# Patient Record
Sex: MALE | Race: WHITE | Employment: UNEMPLOYED | URBAN - METROPOLITAN AREA
[De-identification: names, ages, dates, MRNs, and addresses within clinical notes are randomized per-mention and may not be internally consistent; named-entity substitution may affect disease eponyms.]

---

## 2020-03-02 ENCOUNTER — TELEPHONE (OUTPATIENT)
Dept: FAMILY MEDICINE CLINIC | Age: 35
End: 2020-03-02

## 2020-03-02 NOTE — TELEPHONE ENCOUNTER
TELEPHONE INTAKE ENCOUNTER FOR PSYCHIATRIC VISIT    This questionaire is helpful for Dr. Layne Garcia for the purposes of preparing for your first appointment. This is not part of a diagnostic assessment or treatment plan, and is intended for information purposes only. You may choose to decline to answer any of these questions, your ability to schedule an appointment with Dr. Layne Garcia is not dependent on your willingness to answer. (statement has been read to patient, patient agrees and has no concerns or questions)    PATIENT ACKNOWLEDGED    Who referred you?  DR Maninder Owens    What is your primary reason for making an appointment with Dr. Layne Garcia?  PCP ADVISED TO FIND PSY TO MANAGE RXS    In a few words, what symptoms are you hoping to address?  ANXIETY AND DEPRESSION   How long have these symptoms been a problem? o OVER 20 YEARS   Are you having sleep issues you'd like to address?   o YES  o How many hours a night do you sleep? 6 TO 8    For females, is there any chance you are currently pregnant (and number of weeks pregnant)? Have you recently had a baby or are you planning to become pregnant?  NO   If pregnant, stat appointment and send message to Dr. Migel Redmond If \"yes\" pregnant, are you taking prenatal vitamins? Have you ever been diagnosed with depression, anxiety, or any other psychiatric condition?  If \"yes\", what diagnoses have you been given?  YES, ANXIETY, DEPRESSION, BIPOLAR AND ADHD    Are you currently taking any psychiatric medications?  YES   150 MG WELLBUTRIN, 150 MG EFFEXOR   Inform patient that they will need to have an appointment prior to refills on any current medications, so they will need to ensure they have adequate refills leading up to the appointment date.  Informed patient that Dr. Layne Garcia will make the decision whether to continue any medications they are currently prescribed, and they are not guaranteed refills on any current psychiatric medications. to complete prior to your appointment. An appointment should last an hour but please be prepared to wait for up to 1/2-hour and schedule ample time to return to work after your appointment. Please bring any relevant paperwork from your previous psychiatric appointments/assessments with you to your first appointment. You may also fax this paperwork to [employer] at [phone].

## 2020-04-08 ENCOUNTER — VIRTUAL VISIT (OUTPATIENT)
Dept: BEHAVIORAL/MENTAL HEALTH CLINIC | Age: 35
End: 2020-04-08
Payer: COMMERCIAL

## 2020-04-08 PROBLEM — F43.23 ADJUSTMENT DISORDER WITH MIXED ANXIETY AND DEPRESSED MOOD: Status: ACTIVE | Noted: 2020-04-08

## 2020-04-08 PROBLEM — F31.9 BIPOLAR 1 DISORDER (HCC): Status: ACTIVE | Noted: 2020-04-08

## 2020-04-08 PROCEDURE — 90792 PSYCH DIAG EVAL W/MED SRVCS: CPT | Performed by: PSYCHIATRY & NEUROLOGY

## 2020-04-08 RX ORDER — PROPRANOLOL HYDROCHLORIDE 10 MG/1
10 TABLET ORAL 3 TIMES DAILY
Qty: 90 TABLET | Refills: 3 | Status: SHIPPED | OUTPATIENT
Start: 2020-04-08 | End: 2020-09-15 | Stop reason: SDUPTHER

## 2020-04-08 RX ORDER — VENLAFAXINE HYDROCHLORIDE 150 MG/1
150 CAPSULE, EXTENDED RELEASE ORAL DAILY
Qty: 30 CAPSULE | Refills: 3 | Status: SHIPPED | OUTPATIENT
Start: 2020-04-08 | End: 2020-05-22

## 2020-04-08 NOTE — PROGRESS NOTES
4/8/2020    TELEHEALTH EVALUATION -- Audio/Visual (During [facility])    Due to [place] 19 outbreak, patient's office visit was converted to a virtual visit. Patient was contacted and agreed to proceed with a virtual visit via [email or web address]  The risks and benefits of converting to a virtual visit were discussed in light of the current infectious disease epidemic. Patient also understood that insurance coverage and co-pays are up to their individual insurance plans. Patient Location:        Patient's home address    Provider Location ([facility]):        [facility]    History of Present Illness    Javi Maurice is a 29 y.o. male with a history significant for anxiety and depression that has worsened over the past several years. Stopped Wellbutrin about 2 weeks ago, it was making him anxious, more irritable. 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 , has to pass boards   Marital/Committed relationship and parenting history:  None; \"just like my dog to be honest with you\"  Occupational History:  \"I have had a hard time holding down jobs\"   Legal History none  Spiritual History: none    Past Psychiatric History (over the past 6 months, unless otherwise specified):  Previous diagnoses/symptoms: bipolar, anxiety, depression, adhd  Previous therapy: not currently   Self-injurious behavior/risky thoughts or behaviors (past suicidal ideation/attempt): long time ago; cutting when he was a kid   Violence/Risk to others (past homicidal ideation/attempt): none   Current psychiatric provider: none  Previous psychiatric medication trials:  Wellbutrin (made more irritable) has been on a Effexor for a year, lexapro, haldol for tic disorder that is resolved, Seroquel, Strattera, Vyvanse, Ritalin, Adderall, prozac, lithium, Latuda,     Has not tried: zoloft  Previous psychiatric hospitalizations: once as a kid for anorexia     Pt has never had [address] or ECT    Past Medical History:  History of head trauma/seizures: No    Active Ambulatory Problems     Diagnosis Date Noted    Adjustment disorder with mixed anxiety and depressed mood 04/08/2020    Bipolar 1 disorder ([facility] 04/08/2020     Resolved Ambulatory Problems     Diagnosis Date Noted    No Resolved Ambulatory Problems     No Additional Past Medical History       Substance Abuse History (over the past 12 months, unless otherwise specified):   Tobacco: Denies  Caffeine: Denies  Alcohol: Denies  Marijuana: Denies    Denies other illicit substance use    Past Family History:  Family history of mental health conditions or suicide: paternal gpa had severe depression  Denies History of cardiac difficulties or sudden unexplained or cardiac death     Psychiatric Review Of Systems:  Mood: depressed mood, feelings of worthlessness/excessive guilt, fatigue, irritability and excessive worry  Depression: Endorses   depressed mood, increased guilt, denies psychomotor slowing  Sleep: sleeping 8 hours every 24 hour period on average; reports sleep is ok  History of periods of time with decreased need for sleep: Denies  Excessive worries Yes  Obsessions or Compulsions Denies  Nightmares  No  History of trauma No  Dissociative Symptoms Denies  Symptoms of ADHD Endorses   impulsivity, inattention, decreased concentration, hyperactivity or prev diagnosis of ADHD   Auditory or Visual Hallucinations: Denies  Current suicidal/homicidal ideations: Denies    Medications    Current Outpatient Medications:     propranolol (INDERAL) 10 MG tablet, Take 1 tablet by mouth 3 times daily, Disp: 90 tablet, Rfl: 3    venlafaxine (EFFEXOR XR) 150 MG extended release capsule, Take 1 capsule by mouth daily, Disp: 30 capsule, Rfl: 3    Allergies  Allergies not on

## 2020-04-22 ENCOUNTER — VIRTUAL VISIT (OUTPATIENT)
Dept: BEHAVIORAL/MENTAL HEALTH CLINIC | Age: 35
End: 2020-04-22
Payer: COMMERCIAL

## 2020-04-22 PROCEDURE — G8428 CUR MEDS NOT DOCUMENT: HCPCS | Performed by: PSYCHIATRY & NEUROLOGY

## 2020-04-22 PROCEDURE — 99214 OFFICE O/P EST MOD 30 MIN: CPT | Performed by: PSYCHIATRY & NEUROLOGY

## 2020-04-22 RX ORDER — VENLAFAXINE HYDROCHLORIDE 150 MG/1
150 CAPSULE, EXTENDED RELEASE ORAL DAILY
Qty: 30 CAPSULE | Refills: 3 | Status: SHIPPED | OUTPATIENT
Start: 2020-04-22 | End: 2020-05-22

## 2020-04-22 RX ORDER — VENLAFAXINE HYDROCHLORIDE 75 MG/1
75 CAPSULE, EXTENDED RELEASE ORAL DAILY
Qty: 30 CAPSULE | Refills: 3 | Status: SHIPPED | OUTPATIENT
Start: 2020-04-22 | End: 2020-05-06

## 2020-04-22 NOTE — PROGRESS NOTES
Neurological  [] Endocrine  [] Heme/Lymph  [] Allergic/Immunologic      MEDICATIONS:    Current Outpatient Medications:     venlafaxine (EFFEXOR XR) 150 MG extended release capsule, Take 1 capsule by mouth daily, Disp: 30 capsule, Rfl: 3    propranolol (INDERAL) 10 MG tablet, Take 1 tablet by mouth 3 times daily, Disp: 90 tablet, Rfl: 3    venlafaxine (EFFEXOR XR) 150 MG extended release capsule, Take 1 capsule by mouth daily, Disp: 30 capsule, Rfl: 3    Examination:    Vitals: not taken in person, most recent vitals in chart reviewed  There were no vitals filed for this visit. Wt Readings from Last 3 Encounters:   No data found for Wt       Labs:   no recent labs    Mental Status Examination:    Level of consciousness:  alert and oriented to person, place, and situation  Appearance:  well-appearing good grooming and good hygiene  Behavior/Motor:  no abnormalities noted  Attitude toward examiner:  attentive and good eye contact  Speech:  spontaneous, normal rate and normal volume   Mood: \"fine\", appears sad  Affect:  blunted  Thought processes:  linear and logical  Thought content:  Denies suicidal or homicidal ideation, denies auditory or visual hallucinations  Cognition:  no deficits in attention, concentration notable, recent memory grossly intact  Concentration intact  Memory intact  Insight good   Judgement fair   Fund of Knowledge adequate    Assessment:     Patient symptoms are:   [] Well controlled  [x] Improving  [] Worsening  [] No change    Pt is lenin for safety and denies thoughts of SI/HI. Amenable to plan. No acute concerns to address regarding medications. Medical Diagnoses:  Patient Active Problem List   Diagnosis    Adjustment disorder with mixed anxiety and depressed mood    Bipolar 1 disorder ([facility]     Psychiatric Diagnoses:  1. Adjustment disorder with mixed anxiety and depressed mood    2. Bipolar 1 disorder ([facility]        Plan:    1.  Increase Effexor from 75 mg to 150 mg for depression  2. No Labs ordered today   3. Crisis plan reviewed and patient verbally contracts for safety. Go to ED with emergent symptoms or safety concerns. 4. Risks, benefits, side effects of medications, including any / all black box warnings, discussed with patient, who verbalizes their understanding. Patient denies any thoughts of harm to self and denies any acute safety concerns remains appropriate for outpatient level of care. Will continue to reassess with each appointment. Treatment Plan:  Reviewed current Medications with the patient. Education provided on the compliance with treatment. Reviewed OARRs, no concerns identified     The anticipated benefits and side effects of the medications, including the anticipated results of not receiving the medication, and of alternatives to the medications were explained to the patient and their informed consent was obtained for starting medications as well as adjusting the doses (titration or tapering) as indicated. The above information was given by physician in verbal form and sufficient understanding was in evidence. The patient participated in discussion of the information and question and/or concerns were addressed before the medication was given. PSYCHOTHERAPY/COUNSELING:  Encourage patient to attend outpatient appointments and therapy. [x] Therapeutic interview  [] Supportive  [x] CBT  [x] Ongoing  [] Other    No follow-ups on file. Please note this report has been partially produced using speech recognition software  And may cause contain errors related to that system including grammar, punctuation and spelling as well as words and phrases that may seem inappropriate. If there are questions or concerns please feel free to contact me to clarify.     Hallie Sutton MD  Electronically signed by Hallie Sutton MD on 5/15/2020 at 10:00 AM  5/15/2020 10:00 AM    Psychiatry       Due to this being a TeleHealth encounter, evaluation of

## 2020-05-06 ENCOUNTER — VIRTUAL VISIT (OUTPATIENT)
Dept: BEHAVIORAL/MENTAL HEALTH CLINIC | Age: 35
End: 2020-05-06
Payer: COMMERCIAL

## 2020-05-06 PROCEDURE — 90792 PSYCH DIAG EVAL W/MED SRVCS: CPT | Performed by: PSYCHIATRY & NEUROLOGY

## 2020-05-22 ENCOUNTER — VIRTUAL VISIT (OUTPATIENT)
Dept: BEHAVIORAL/MENTAL HEALTH CLINIC | Age: 35
End: 2020-05-22
Payer: COMMERCIAL

## 2020-05-22 PROCEDURE — G8428 CUR MEDS NOT DOCUMENT: HCPCS | Performed by: PSYCHIATRY & NEUROLOGY

## 2020-05-22 PROCEDURE — 99214 OFFICE O/P EST MOD 30 MIN: CPT | Performed by: PSYCHIATRY & NEUROLOGY

## 2020-05-22 NOTE — PROGRESS NOTES
and depressed mood    2. Bipolar 1 disorder ([facility] [address]        Plan:    1. Start/Continue Effexor XR 74 mg QD   2. Propranolol 10 mg TID   3. No Labs ordered today   4. Crisis plan reviewed and patient verbally contracts for safety. Go to ED with emergent symptoms or safety concerns. 5. Risks, benefits, side effects of medications, including any / all black box warnings, discussed with patient, who verbalizes their understanding. Patient denies any thoughts of harm to self and denies any acute safety concerns remains appropriate for outpatient level of care. Will continue to reassess with each appointment. Treatment Plan:  Reviewed current Medications with the patient. Education provided on the compliance with treatment. Reviewed OARRs, no concerns identified     The anticipated benefits and side effects of the medications, including the anticipated results of not receiving the medication, and of alternatives to the medications were explained to the patient and their informed consent was obtained for starting medications as well as adjusting the doses (titration or tapering) as indicated. The above information was given by physician in verbal form and sufficient understanding was in evidence. The patient participated in discussion of the information and question and/or concerns were addressed before the medication was given. PSYCHOTHERAPY/COUNSELING:  Encourage patient to attend outpatient appointments and therapy. [x] Therapeutic interview  [] Supportive  [x] CBT  [x] Ongoing  [] Other    No follow-ups on file. Follow-up in 2weeks, patient informed to call for follow-up with Meliza Magaña     Please note this report has been partially produced using speech recognition software  And may cause contain errors related to that system including grammar, punctuation and spelling as well as words and phrases that may seem inappropriate.  If there are questions or concerns please feel free to contact me to

## 2020-06-02 NOTE — PROGRESS NOTES
Outpatient Medications:     propranolol (INDERAL) 10 MG tablet, Take 1 tablet by mouth 3 times daily, Disp: 90 tablet, Rfl: 3    Allergies  No Known Allergies    Evaluation    Vitals:   Reviewed vitals from recent visit, no concerns     BP Readings from Last 3 Encounters:   No data found for BP       Wt Readings from Last 3 Encounters:   No data found for Wt         Labs:     No other recent labs or imaging    No results found for: ALT, AST, GGT, ALKPHOS, BILITOT      Medical Review Of Systems:  Constitutional:  Negative for fever and activity change. HENT:  Negative for nosebleeds, congestion, rhinorrhea, mouth sores, neck pain and neck stiffness. Eyes:   No complaints of blurred vision. Respiratory:  Negative for cough and wheezing. Cardiovascular:  Negative for chest pain. Gastrointestinal:  Negative for nausea, abdominal pain, diarrhea and constipation  Genitourinary:  Negative of decreased urine volume and difficulty urinating. Reproductive: No complaints reported at this time. Musculoskeletal:  Negative for back pain. Skin:  Negative for pallor, rash and wound. Neurological:  Negative for dizziness, weakness and headaches.     Mental Status Evaluation:  Level of consciousness:  alert and oriented to person, place, and situation  Appearance:  well-appearing good grooming and good hygiene  Behavior/Motor:  no abnormalities noted  Attitude toward examiner:  attentive and good eye contact  Speech:  spontaneous, normal rate and normal volume   Mood: \"down\", appears depressed  Affect:  blunted  Thought processes:  linear and logical  Thought content:  Denies suicidal or homicidal ideation, denies auditory or visual hallucinations  Cognition:  no deficits in attention, concentration notable, recent memory grossly intact  Concentration intact  Memory intact  Insight good   Judgement fair   Fund of Knowledge adequate    Assessment:   Pt is a 63-year-old male with history of bipolar diagnosis who presents for

## 2020-07-07 ENCOUNTER — VIRTUAL VISIT (OUTPATIENT)
Dept: BEHAVIORAL/MENTAL HEALTH CLINIC | Age: 35
End: 2020-07-07
Payer: COMMERCIAL

## 2020-07-07 PROCEDURE — 99214 OFFICE O/P EST MOD 30 MIN: CPT | Performed by: PSYCHIATRY & NEUROLOGY

## 2020-07-07 PROCEDURE — G8428 CUR MEDS NOT DOCUMENT: HCPCS | Performed by: PSYCHIATRY & NEUROLOGY

## 2020-07-07 RX ORDER — FLUOXETINE 10 MG/1
CAPSULE ORAL
Qty: 53 CAPSULE | Refills: 0 | Status: SHIPPED | OUTPATIENT
Start: 2020-07-07 | End: 2020-07-30

## 2020-07-07 RX ORDER — VENLAFAXINE HYDROCHLORIDE 37.5 MG/1
CAPSULE, EXTENDED RELEASE ORAL
Qty: 28 CAPSULE | Refills: 0 | Status: SHIPPED | OUTPATIENT
Start: 2020-07-07 | End: 2020-07-30

## 2020-07-07 NOTE — PROGRESS NOTES
7/7/2020    TELEHEALTH PSYCHIATRY FOLLOWUP -- Audio/Visual (During [facility]-29 public health emergency)      Psychiatric Diagnoses:  1. Adjustment disorder with mixed anxiety and depressed mood    2. Moderate episode of recurrent major depressive disorder (HCC)          Due to COVID 19 outbreak, patient's office visit was converted to a virtual visit. Patient was contacted and agreed to proceed with a virtual visit via [employer]  30 minutes with direct communication with patient for encounter The risks and benefits of converting to a virtual visit were discussed in light of the current infectious disease epidemic. Patient also understood that insurance coverage and co-pays are up to their individual insurance plans. Patient Location:        Patient's home address  Provider Location ([facility]):        [facility]        Assessment/Plan:   Taper effexor    Medical Diagnoses:  Patient Active Problem List   Diagnosis    Adjustment disorder with mixed anxiety and depressed mood    Bipolar 1 disorder ([facility]           AT TODAY'S VISIT   1. Taper off Effexor 75 for one week then 37.5 for 1-2 weeks then off   2. Consider abilify in future   3. Start prozac 10 mg one week then 20    4. No Labs ordered today   5. Crisis plan reviewed and patient verbally contracts for safety. Go to ED with emergent symptoms or safety concerns. 6. Risks, benefits, side effects of medications, including any / all black box warnings, discussed with patient, who verbalizes their understanding. Pt is lenin for safety and denies thoughts of SI/HI. Amenable to plan. No acute concerns to address regarding medications. Subjective:      Pt reports not feeling like he has any sense of urgency, feels like he has no get up and go energy. Only sleeping often during the day. Very little interactions with others. Denies suicidal thoughts. Does enjoy watching tv.  Does enjoy spending time relaxing at home but doesn't enjoy physical activity as much as he once did and feels unmotivated. Says he would like to try tapering off effexor, which he says he thinks has made him paradoxically more depressed. Patient reports they have been compliant with current medication regimen and have not missed a dose. Patient denies medication side effects. At today's visit, patient denies thoughts of harm to self or others since last appointment, and denies auditory or visual hallucinations. At today's visit, pt reports that since our last visit, their average MOOD has been (on a scale of 1-10, with 1 being severely depressed and 10 being not depressed at all)  Very depressed [] 1  [] 2  [] 3  [x] 4  [] 5  [] 6  [] 7  [] 8  [] 9  [] 10  Not depressed    At today's visit, pt reports that since our last visit, their average ANXIETY has been (on a scale of 1-10, with 10 being severely anxious and 1 being not anxious at all)  Not anxious [] 1     [] 2     [] 3     [] 4   [] 5    [x] 6      [] 7   [] 8   [] 9       [] 10  Very anxious       At today's visit, pt reports that since our last visit, their average APPETITE has been    [x] Decreased     [] Normal/Unchanged   [] Increased      At today's visit, pt reports that since our last visit, their average HOURS OF SLEEP (every 24 hours) has been    [] 0-3   [] 4-5    [] 5-6    [x] 6-7    [] 8-9    [] 10-11   [] 11+    At today's visit, pt reports that since our last visit, their average Energy has been     [x] Poor    [] Average  [] Increased         Aggression:  [] yes  [x] no    Patient is [x] Able to contract for safety  [] unable to CONTRACT FOR SAFETY     ROS:  [x] All negative/unchanged except if checked.  Explain positive(checked items) below:     [] Constitutional  [] Eyes  [] Ear/Nose/Mouth/Throat  [] Respiratory  [] CV  [] GI  []   [] Musculoskeletal  [] Skin/Breast  [] Neurological  [] Endocrine  [] Heme/Lymph  [] Allergic/Immunologic      MEDICATIONS:    Current Outpatient Medications:    Therapeutic interview  [] Supportive  [x] CBT  [x] Ongoing  [] Other    No follow-ups on file. Follow-up in 2 weeks, patient informed to call for follow-up    Please note this report has been partially produced using speech recognition software  And may cause contain errors related to that system including grammar, punctuation and spelling as well as words and phrases that may seem inappropriate. If there are questions or concerns please feel free to contact me to clarify. Charly Torres MD  Electronically signed by Charly Torres MD on 9/2/2020 at 10:46 AM  9/2/2020 10:46 AM    Psychiatry   Due to this being a TeleHealth encounter, evaluation of the following organ systems is limited: Vitals/Constitutional/EENT/Resp/CV/GI//MS/Neuro/Skin/Heme-Lymph-Imm. An  electronic signature was used to authenticate this note. --Charly Torres MD on 9/2/2020 at 10:46 AM    Pursuant to the emergency declaration under the [facility], [facility] waiver authority and the [employer] Act, this Virtual  Visit was conducted, with patient's consent, to reduce the patient's risk of exposure to COVID-19 and provide continuity of care for an established patient Services were provided through a video synchronous discussion virtually to substitute for in-person clinic visit.

## 2020-07-08 ENCOUNTER — TELEPHONE (OUTPATIENT)
Dept: FAMILY MEDICINE CLINIC | Age: 35
End: 2020-07-08

## 2020-07-08 RX ORDER — FLUOXETINE HYDROCHLORIDE 20 MG/1
20 CAPSULE ORAL DAILY
Qty: 30 CAPSULE | Refills: 3 | Status: SHIPPED | OUTPATIENT
Start: 2020-07-08 | End: 2020-07-30

## 2020-07-08 NOTE — TELEPHONE ENCOUNTER
FLUoxetine (PROZAC) 10 MG capsule [[id number]]     Order Details   Dose, Route, Frequency: As Directed   Dispense Quantity: 53 capsule Refills: 0 Fills remaining: --           Sig: Take 1 capsule by mouth daily for 7 days, THEN 2 capsules daily for 23 days.          Written Date: 07/07/20 Expiration Date: 07/07/21     Start Date: 07/07/20 End Date: 08/06/20 after 30 doses     Ordering Provider:  -- Authorizing Provider: Aj Mitchell MD Ordering User:  Aj Mitchell MD             Ph[facility]  [facility] [id number]    Pharmacy could not fill above Rx as written. Pharmacy dispensed 10 mg #7 to patient. Per insurance, patient needs additional Rx for 20mg once daily #30. Patient aware. Please send additional Rx to pharmacy.

## 2020-07-08 NOTE — TELEPHONE ENCOUNTER
REFILL REQUEST UPDATE FROM PHYSICIAN    Requested Prescriptions     Signed Prescriptions Disp Refills    FLUoxetine (PROZAC) 20 MG capsule 30 capsule 3     Sig: Take 1 capsule by mouth daily     Authorizing Provider: Jonathan Ford       The following medications were refilled per patient request to the pharmacy identified as patient's preference pharmacy. Prior to refill, the [place] has been reviewed as needed for controlled substance monitoring and no concerns were identified. Orders Placed This Encounter   Medications    FLUoxetine (PROZAC) 20 MG capsule     Sig: Take 1 capsule by mouth daily     Dispense:  30 capsule     Refill:  3       Please inform the patient of any pending lab work, so they may complete this prior to the next medication fill.      Miranda Go MD

## 2020-07-30 ENCOUNTER — VIRTUAL VISIT (OUTPATIENT)
Dept: BEHAVIORAL/MENTAL HEALTH CLINIC | Age: 35
End: 2020-07-30
Payer: COMMERCIAL

## 2020-07-30 PROCEDURE — G8428 CUR MEDS NOT DOCUMENT: HCPCS | Performed by: PSYCHIATRY & NEUROLOGY

## 2020-07-30 PROCEDURE — 99214 OFFICE O/P EST MOD 30 MIN: CPT | Performed by: PSYCHIATRY & NEUROLOGY

## 2020-07-30 RX ORDER — FLUOXETINE 10 MG/1
30 CAPSULE ORAL DAILY
Qty: 90 CAPSULE | Refills: 2 | Status: SHIPPED | OUTPATIENT
Start: 2020-07-30 | End: 2020-09-15 | Stop reason: SDUPTHER

## 2020-07-30 NOTE — PROGRESS NOTES
7/30/2020    TELEHEALTH PSYCHIATRY FOLLOWUP -- Audio/Visual (During UQEML-66 public health emergency)      Psychiatric Diagnoses:  1. Bipolar 1 disorder ([facility]    2. Adjustment disorder with mixed anxiety and depressed mood          Due to COVID 19 outbreak, patient's office visit was converted to a virtual visit. Patient was contacted and agreed to proceed with a virtual visit via DOXY  30 minutes with direct communication with patient for encounter The risks and benefits of converting to a virtual visit were discussed in light of the current infectious disease epidemic. Patient also understood that insurance coverage and co-pays are up to their individual insurance plans. Patient Location:        Patient's home address  Provider Location (City/State):        [address]      Assessment/Plan:   Doing well, planning for his exam in September which he is anxious about. Will increase prozac. Medical Diagnoses:  Patient Active Problem List   Diagnosis    Adjustment disorder with mixed anxiety and depressed mood    Bipolar 1 disorder ([facility]       DATE and changes made   7/7/20  o Tapered plan off Effexor and on to Prozac    7/30/20  o Pt now on Prozac 20 mg and doing well with this but will increase to 30 mg QD for anxiety    o Now off of effexor (first day off)   o Ask about geneSight     AT TODAY'S VISIT   1. Tapered off Effexor today was first day without it   2. Is on week 3 of Prozac 20 mg   3. No Labs ordered today  4. Crisis plan reviewed and patient verbally contracts for safety. Go to ED with emergent symptoms or safety concerns. 5. Risks, benefits, side effects of medications, including any / all black box warnings, discussed with patient, who verbalizes their understanding. Pt is lenin for safety and denies thoughts of SI/HI. Amenable to plan. No acute concerns to address regarding medications.      Subjective:      Pt reports prozac has been helpful   He stopped effexor completely today  Mood has been pretty good on prozac - denies racing thoughts, has had some anxiety   Says it has not been very bad   Sleeping better mood is better  Taking test in September     Patient reports they have been compliant with current medication regimen and have not missed a dose. Patient denies medication side effects. At today's visit, patient denies thoughts of harm to self or others since last appointment, and denies auditory or visual hallucinations. At today's visit, pt reports that since our last visit, their average MOOD has been (on a scale of 1-10, with 1 being severely depressed and 10 being not depressed at all)  Very depressed [] 1  [] 2  [] 3  [] 4  [] 5  [] 6  [] 7  [x] 8  [] 9  [] 10  Not depressed    At today's visit, pt reports that since our last visit, their average ANXIETY has been (on a scale of 1-10, with 10 being severely anxious and 1 being not anxious at all)  Not anxious [] 1     [x] 2     [] 3     [] 4   [] 5    [] 6      [] 7   [] 8   [] 9       [] 10  Very anxious       At today's visit, pt reports that since our last visit, their average APPETITE has been    [] Decreased     [x] Normal/Unchanged   [] Increased      At today's visit, pt reports that since our last visit, their average HOURS OF SLEEP (every 24 hours) has been    [] 0-3   [] 4-5    [] 5-6    [] 6-7    [x] 8-9    [] 10-11   [] 11+    At today's visit, pt reports that since our last visit, their average Energy has been     [] Poor    [x] Average  [] Increased         Aggression:  [] yes  [x] no    Patient is [x] Able to contract for safety  [] unable to CONTRACT FOR SAFETY     ROS:  [x] All negative/unchanged except if checked.  Explain positive(checked items) below:     [] Constitutional  [] Eyes  [] Ear/Nose/Mouth/Throat  [] Respiratory  [] CV  [] GI  []   [] Musculoskeletal  [] Skin/Breast  [] Neurological  [] Endocrine  [] Heme/Lymph  [] Allergic/Immunologic    MEDICATIONS:    Current Outpatient Medications:     propranolol (INDERAL) 10 MG tablet, Take 1 tablet by mouth 3 times daily, Disp: 90 tablet, Rfl: 3    Examination:    Vitals: not taken in person, most recent vitals in chart reviewed  There were no vitals filed for this visit. Wt Readings from Last 3 Encounters:   No data found for Wt       Labs:   no recent labs    Mental Status Examination:    Level of consciousness:  alert and oriented to person, place, and situation  Appearance:  well-appearing good grooming and good hygiene  Behavior/Motor:  no abnormalities noted  Attitude toward examiner:  attentive and good eye contact  Speech:  spontaneous, normal rate and normal volume   Mood: \"better\"  Affect:  mood congruent  Thought processes:  linear and logical  Thought content:  Denies suicidal or homicidal ideation, denies auditory or visual hallucinations  Cognition:  no deficits in attention, concentration notable, recent memory grossly intact  Concentration intact  Memory intact  Insight good   Judgement fair   Fund of Knowledge adequate    Treatment Plan:  Reviewed current Medications with the patient. Education provided on the compliance with treatment. Reviewed OARRs, no concerns identified     The anticipated benefits and side effects of the medications, including the anticipated results of not receiving the medication, and of alternatives to the medications were explained to the patient and their informed consent was obtained for starting medications as well as adjusting the doses (titration or tapering) as indicated. The above information was given by physician in verbal form and sufficient understanding was in evidence. The patient participated in discussion of the information and question and/or concerns were addressed before the medication was given. PSYCHOTHERAPY/COUNSELING:  Encourage patient to attend outpatient appointments and therapy.     [x] Therapeutic interview  [] Supportive  [x] CBT  [x] Ongoing  [] Other    No follow-ups on file. Follow-up in 2 weeks, patient informed to call for follow-up    Please note this report has been partially produced using speech recognition software  And may cause contain errors related to that system including grammar, punctuation and spelling as well as words and phrases that may seem inappropriate. If there are questions or concerns please feel free to contact me to clarify. David Hale MD  Electronically signed by David Hale MD on 7/30/2020 at 4:06 PM  7/30/2020 4:06 PM    Psychiatry   Due to this being a TeleHealth encounter, evaluation of the following organ systems is limited: Vitals/Constitutional/EENT/Resp/CV/GI//MS/Neuro/Skin/Heme-Lymph-Imm. An  electronic signature was used to authenticate this note. --David Hale MD on 7/30/2020 at 4:06 PM    Pursuant to the emergency declaration under the [facility] waiver authority and the [employer] and [employer] Act, this Virtual  Visit was conducted, with patient's consent, to reduce the patient's risk of exposure to COVID-19 and provide continuity of care for an established patient Services were provided through a video synchronous discussion virtually to substitute for in-person clinic visit.

## 2020-09-15 ENCOUNTER — VIRTUAL VISIT (OUTPATIENT)
Dept: BEHAVIORAL/MENTAL HEALTH CLINIC | Age: 35
End: 2020-09-15
Payer: COMMERCIAL

## 2020-09-15 PROCEDURE — G8428 CUR MEDS NOT DOCUMENT: HCPCS | Performed by: PSYCHIATRY & NEUROLOGY

## 2020-09-15 PROCEDURE — 99214 OFFICE O/P EST MOD 30 MIN: CPT | Performed by: PSYCHIATRY & NEUROLOGY

## 2020-09-15 RX ORDER — PROPRANOLOL HYDROCHLORIDE 10 MG/1
10 TABLET ORAL 3 TIMES DAILY
Qty: 90 TABLET | Refills: 3 | Status: SHIPPED | OUTPATIENT
Start: 2020-09-15 | End: 2020-11-11

## 2020-09-15 RX ORDER — FLUOXETINE 10 MG/1
30 CAPSULE ORAL DAILY
Qty: 90 CAPSULE | Refills: 2 | Status: SHIPPED | OUTPATIENT
Start: 2020-09-15 | End: 2020-11-11

## 2020-09-15 NOTE — PROGRESS NOTES
chart reviewed  There were no vitals filed for this visit. Wt Readings from Last 3 Encounters:   No data found for Wt       Labs:   no recent labs    Mental Status Examination:    Limited MSE 2/2 telephone with audio only   Speech:  spontaneous, normal rate and normal volume   Mood: \"ok\"  Affect:  mood congruent  Thought processes:  linear and logical  Thought content:  Denies suicidal or homicidal ideation, denies auditory or visual hallucinations  Cognition:  no deficits in attention, concentration notable, recent memory grossly intact  Concentration intact  Memory intact  Insight good   Judgement fair   Fund of Knowledge adequate    Treatment Plan:  Reviewed current Medications with the patient. Education provided on the compliance with treatment. Reviewed OARRs, no concerns identified     The anticipated benefits and side effects of the medications, including the anticipated results of not receiving the medication, and of alternatives to the medications were explained to the patient and their informed consent was obtained for starting medications as well as adjusting the doses (titration or tapering) as indicated. The above information was given by physician in verbal form and sufficient understanding was in evidence. The patient participated in discussion of the information and question and/or concerns were addressed before the medication was given. PSYCHOTHERAPY/COUNSELING:  Encourage patient to attend outpatient appointments and therapy. [x] Therapeutic interview  [] Supportive  [x] CBT  [x] Ongoing  [] Other    No follow-ups on file. Follow-up in 2 weeks, patient informed to call for follow-up    Please note this report has been partially produced using speech recognition software  And may cause contain errors related to that system including grammar, punctuation and spelling as well as words and phrases that may seem inappropriate.  If there are questions or concerns please feel free to contact me to clarify. Chris Argueta MD  Electronically signed by Chris Argueta MD on 9/15/2020 at 10:53 AM  9/15/2020 10:53 AM    Psychiatry   Due to this being a TeleHealth encounter, evaluation of the following organ systems is limited: Vitals/Constitutional/EENT/Resp/CV/GI//MS/Neuro/Skin/Heme-Lymph-Imm. An  electronic signature was used to authenticate this note. --Chris Argueta MD on 9/15/2020 at 10:53 AM    Pursuant to the emergency declaration under the [address] [place] [address], [facility] waiver authority and the [employer] and [employer] Act, this Virtual  Visit was conducted, with patient's consent, to reduce the patient's risk of exposure to COVID-19 and provide continuity of care for an established patient Services were provided through a video synchronous discussion virtually to substitute for in-person clinic visit.

## 2020-10-07 ENCOUNTER — OFFICE VISIT (OUTPATIENT)
Dept: BEHAVIORAL/MENTAL HEALTH CLINIC | Age: 35
End: 2020-10-07
Payer: COMMERCIAL

## 2020-10-07 VITALS
WEIGHT: 175 LBS | TEMPERATURE: 98.5 F | HEIGHT: 69 IN | DIASTOLIC BLOOD PRESSURE: 68 MMHG | SYSTOLIC BLOOD PRESSURE: 112 MMHG | BODY MASS INDEX: 25.92 KG/M2 | OXYGEN SATURATION: 98 % | HEART RATE: 77 BPM

## 2020-10-07 PROCEDURE — G8484 FLU IMMUNIZE NO ADMIN: HCPCS | Performed by: PSYCHIATRY & NEUROLOGY

## 2020-10-07 PROCEDURE — 99214 OFFICE O/P EST MOD 30 MIN: CPT | Performed by: PSYCHIATRY & NEUROLOGY

## 2020-10-07 PROCEDURE — 1036F TOBACCO NON-USER: CPT | Performed by: PSYCHIATRY & NEUROLOGY

## 2020-10-07 PROCEDURE — G8419 CALC BMI OUT NRM PARAM NOF/U: HCPCS | Performed by: PSYCHIATRY & NEUROLOGY

## 2020-10-07 PROCEDURE — G8427 DOCREV CUR MEDS BY ELIG CLIN: HCPCS | Performed by: PSYCHIATRY & NEUROLOGY

## 2020-10-07 SDOH — HEALTH STABILITY: MENTAL HEALTH: HOW OFTEN DO YOU HAVE A DRINK CONTAINING ALCOHOL?: NEVER

## 2020-10-07 NOTE — PROGRESS NOTES
Patient reports they have been compliant with current medication regimen and have not missed a dose. Patient denies medication side effects. At today's visit, patient denies thoughts of harm to self or others since last appointment, and denies auditory or visual hallucinations. At today's visit, pt reports that since our last visit, their average MOOD has been (on a scale of 1-10, with 1 being severely depressed and 10 being not depressed at all)  Very depressed [] 1  [] 2  [] 3  [x] 4  [] 5  [] 6  [] 7  [] 8  [] 9  [] 10  Not depressed    At today's visit, pt reports that since our last visit, their average ANXIETY has been (on a scale of 1-10, with 10 being severely anxious and 1 being not anxious at all)  Not anxious [] 1     [] 2     [] 3     [] 4   [] 5    [] 6      [] 7   [x] 8   [] 9       [] 10  Very anxious       At today's visit, pt reports that since our last visit, their average APPETITE has been    [] Decreased     [x] Normal/Unchanged   [] Increased      At today's visit, pt reports that since our last visit, their average HOURS OF SLEEP (every 24 hours) has been    [] 0-3   [] 4-5    [] 5-6    [] 6-7    [x] 8-9    [] 10-11   [] 11+    At today's visit, pt reports that since our last visit, their average Energy has been     [] Poor    [x] Average  [] Increased         Aggression:  [] yes  [x] no    Patient is [x] Able to contract for safety  [] unable to CONTRACT FOR SAFETY     ROS:  [x] All negative/unchanged except if checked. Explain positive(checked items) below:     [] Constitutional  [] Eyes  [] Ear/Nose/Mouth/Throat  [] Respiratory  [] CV  [] GI  []   [] Musculoskeletal  [] Skin/Breast  [] Neurological  [] Endocrine  [] Heme/Lymph  [] Allergic/Immunologic      MEDICATIONS:  No current outpatient medications on file.     Examination:    Vitals: not taken in person, most recent vitals in chart reviewed  Vitals:    10/07/20 1629   BP: 112/68   Pulse: 77   Temp: 98.5 °F (36.9 °C) SpO2: 98%       Wt Readings from Last 3 Encounters:   10/07/20 175 lb (79.4 kg)       Labs:   no recent labs    Mental Status Examination:    Level of consciousness:  alert and oriented to person, place, and situation  Appearance:  well-appearing good grooming and good hygiene  Behavior/Motor:  no abnormalities noted  Attitude toward examiner:  attentive and good eye contact  Speech:  spontaneous, normal rate and normal volume   Mood: \"anxious\"  Affect:  mood congruent  Thought processes:  linear and logical  Thought content:  Denies suicidal or homicidal ideation, denies auditory or visual hallucinations  Cognition:  no deficits in attention, concentration notable, recent memory grossly intact  Concentration intact  Memory intact  Insight good   Judgement fair   Fund of Knowledge adequate    Treatment Plan:  Reviewed current Medications with the patient. Education provided on the compliance with treatment. Reviewed OARRs, no concerns identified     The anticipated benefits and side effects of the medications, including the anticipated results of not receiving the medication, and of alternatives to the medications were explained to the patient and their informed consent was obtained for starting medications as well as adjusting the doses (titration or tapering) as indicated. The above information was given by physician in verbal form and sufficient understanding was in evidence. The patient participated in discussion of the information and question and/or concerns were addressed before the medication was given. PSYCHOTHERAPY/COUNSELING:  Encourage patient to attend outpatient appointments and therapy. [x] Therapeutic interview  [] Supportive  [x] CBT  [x] Ongoing  [] Other    No follow-ups on file.  Follow-up in 2 weeks, patient informed to call for follow-up    Please note this report has been partially produced using speech recognition software And may cause contain errors related to that system including grammar, punctuation and spelling as well as words and phrases that may seem inappropriate. If there are questions or concerns please feel free to contact me to clarify.     Sowmya Payne MD  Electronically signed by Sowmya Payne MD on 12/16/2020 at 12:05 PM  12/16/2020 12:05 PM    Psychiatry

## 2020-10-20 ENCOUNTER — VIRTUAL VISIT (OUTPATIENT)
Dept: BEHAVIORAL/MENTAL HEALTH CLINIC | Age: 35
End: 2020-10-20
Payer: COMMERCIAL

## 2020-10-20 PROBLEM — F33.1 MAJOR DEPRESSIVE DISORDER, RECURRENT EPISODE, MODERATE (HCC): Status: ACTIVE | Noted: 2020-10-20

## 2020-10-20 PROBLEM — F41.1 GAD (GENERALIZED ANXIETY DISORDER): Status: ACTIVE | Noted: 2020-10-20

## 2020-10-20 PROCEDURE — G8428 CUR MEDS NOT DOCUMENT: HCPCS | Performed by: PSYCHIATRY & NEUROLOGY

## 2020-10-20 PROCEDURE — 99214 OFFICE O/P EST MOD 30 MIN: CPT | Performed by: PSYCHIATRY & NEUROLOGY

## 2020-10-20 NOTE — PROGRESS NOTES
10/20/2020    TELEHEALTH PSYCHIATRY FOLLOWUP -- Audio/Visual (During MQGEQ-18 public health emergency)      Psychiatric Diagnoses:  1. [place] (generalized anxiety disorder)    2. Major depressive disorder, recurrent episode, moderate (HCC)      Due to COVID 19 outbreak, patient's office visit was converted to a virtual visit. Patient was contacted and agreed to proceed with a virtual visit via DOXY  30 minutes with direct communication with patient for encounter The risks and benefits of converting to a virtual visit were discussed in light of the current infectious disease epidemic. Patient also understood that insurance coverage and co-pays are up to their individual insurance plans. Patient Location:        Patient's home address  Provider Location (City/State):        [address] [place] [address]        Assessment/Plan:   Increase trintellix, is helping with anxiety    Medical Diagnoses:  Patient Active Problem List   Diagnosis    Adjustment disorder with mixed anxiety and depressed mood    [facility] ([facility]    Major depressive disorder, recurrent episode, moderate ([facility]    [place] (generalized anxiety disorder)           DATE and changes made   10/20  o Prozac 30 mg QD   o Trintellix 5 increase to 10 mg QD       AT TODAY'S VISIT     1. No Labs ordered today   2. Crisis plan reviewed and patient verbally contracts for safety. Go to ED with emergent symptoms or safety concerns. 3. Risks, benefits, side effects of medications, including any / all black box warnings, discussed with patient, who verbalizes their understanding. Pt is lenin for safety and denies thoughts of SI/HI. Amenable to plan. No acute concerns to address regarding medications.        Subjective:      Pt reports feeling a better sense of anxiety relief with trintellix   Says he feels more calm   Mild nausea   Sleeping ok at night      Patient reports they have been compliant with current medication regimen and have not missed a daily, Disp: 90 tablet, Rfl: 3    Examination:    Vitals: not taken in person, most recent vitals in chart reviewed  There were no vitals filed for this visit. Wt Readings from Last 3 Encounters:   10/07/20 175 lb (79.4 kg)       Labs:   no recent labs    Mental Status Examination:    Level of consciousness:  alert and oriented to person, place, and situation  Appearance:  well-appearing good grooming and good hygiene  Behavior/Motor:  no abnormalities noted  Attitude toward examiner:  attentive and good eye contact  Speech:  spontaneous, normal rate and normal volume   Mood: \"better\"  Affect:  mood congruent  Thought processes:  linear and logical  Thought content:  Denies suicidal or homicidal ideation, denies auditory or visual hallucinations  Cognition:  no deficits in attention, concentration notable, recent memory grossly intact  Concentration intact  Memory intact  Insight good   Judgement fair   Fund of Knowledge adequate    Treatment Plan:  Reviewed current Medications with the patient. Education provided on the compliance with treatment. Reviewed OARRs, no concerns identified     The anticipated benefits and side effects of the medications, including the anticipated results of not receiving the medication, and of alternatives to the medications were explained to the patient and their informed consent was obtained for starting medications as well as adjusting the doses (titration or tapering) as indicated. The above information was given by physician in verbal form and sufficient understanding was in evidence. The patient participated in discussion of the information and question and/or concerns were addressed before the medication was given. PSYCHOTHERAPY/COUNSELING:  Encourage patient to attend outpatient appointments and therapy. [x] Therapeutic interview  [] Supportive  [x] CBT  [x] Ongoing  [] Other    No follow-ups on file.  Follow-up in 2 weeks, patient informed to call for follow-up    Please note this report has been partially produced using speech recognition software  And may cause contain errors related to that system including grammar, punctuation and spelling as well as words and phrases that may seem inappropriate. If there are questions or concerns please feel free to contact me to clarify. Ac Ceballos MD  Electronically signed by Ac Ceballos MD on 10/20/2020 at 12:47 PM  10/20/2020 12:47 PM    Psychiatry   Due to this being a TeleHealth encounter, evaluation of the following organ systems is limited: Vitals/Constitutional/EENT/Resp/CV/GI//MS/Neuro/Skin/Heme-Lymph-Imm. An  electronic signature was used to authenticate this note. --Ac Ceballos MD on 10/20/2020 at 12:47 PM    Pursuant to the emergency declaration under the [facility], [facility] waiver authority and the [employer] and [employer] Act, this Virtual  Visit was conducted, with patient's consent, to reduce the patient's risk of exposure to COVID-19 and provide continuity of care for an established patient Services were provided through a video synchronous discussion virtually to substitute for in-person clinic visit.

## 2020-10-29 ENCOUNTER — TELEPHONE (OUTPATIENT)
Dept: FAMILY MEDICINE CLINIC | Age: 35
End: 2020-10-29

## 2020-10-29 NOTE — TELEPHONE ENCOUNTER
Please be advised. Patient called office because he felt like he was having panic attack. He wanted to know if you would call him. I told him you were out of the office until Monday. I gave him number to  mental health crisis line and crisis text line. He said he would call crisis line.

## 2020-11-10 ENCOUNTER — VIRTUAL VISIT (OUTPATIENT)
Dept: BEHAVIORAL/MENTAL HEALTH CLINIC | Age: 35
End: 2020-11-10
Payer: COMMERCIAL

## 2020-11-10 PROCEDURE — 99443 PR PHYS/QHP TELEPHONE EVALUATION 21-30 MIN: CPT | Performed by: PSYCHIATRY & NEUROLOGY

## 2020-11-10 NOTE — PROGRESS NOTES
11/10/2020    PSYCHIATRY FOLLOWUP -- Audio ONLY TELEPHONE ENCOUNTER      Psychiatric Diagnoses:  1. Major depressive disorder, recurrent episode, moderate ([address]    2. WELLINGTON (generalized anxiety disorder)          Due to COVID 23 outbreak, patient's office visit was converted to a virtual visit. Patient was contacted and agreed to proceed with a virtual visit via telephone  30 minutes with direct communication with patient for encounter The risks and benefits of converting to a virtual visit were discussed in light of the current infectious disease epidemic. Patient also understood that insurance coverage and co-pays are up to their individual insurance plans. Patient Location:        Patient's home address  Provider Location (City/State):        [place]    This note will not be viewable in [email or web address] for the following reason(s). Patient request to not have note available in [email or web address]. Assessment/Plan:   Patient would like to trial a washout of his medications and reports he has been taking Prozac     Patient is going to go off prozac, will attempt a trial of a washout perior as he is feeling he no longer needs to be on medication and he does seem stable at this point, has not had. Discussed he should make a follow up ASAP for any signs of hypomania or worsening depression or sleep, and patient is amenable and in agreement with plan     Medical Diagnoses:  Patient Active Problem List   Diagnosis    Adjustment disorder with mixed anxiety and depressed mood    Bipolar 1 disorder ([address]    Major depressive disorder, recurrent episode, moderate ([address]    WELLINGTON (generalized anxiety disorder)           DATE and changes made   11/10  o Patient would like to stop medication     AT TODAY'S VISIT     1. No Labs ordered today   2. Crisis plan reviewed and patient verbally contracts for safety. Go to ED with emergent symptoms or safety concerns.   3. Risks, benefits, side effects of medications, including any / all black box warnings, discussed with patient, who verbalizes their understanding. Pt is lenin for safety and denies thoughts of SI/HI. Amenable to plan. No acute concerns to address regarding medications. Subjective:      Pt reports he would like to go off the prozac, has been taking prozac 20 mg for one week. Says he is less tired when not on this medication and may benefit from going off. Patient talks about having been on psych meds since he was a teen, would like to try a washout. Patient reports they have been compliant with current medication regimen and have not missed a dose. Patient denies medication side effects. At today's visit, patient denies thoughts of harm to self or others since last appointment, and denies auditory or visual hallucinations.      At today's visit, pt reports that since our last visit, their average MOOD has been (on a scale of 1-10, with 1 being severely depressed and 10 being not depressed at all)  Very depressed [] 1  [] 2  [] 3  [] 4  [] 5  [] 6  [x] 7  [] 8  [] 9  [] 10  Not depressed    At today's visit, pt reports that since our last visit, their average ANXIETY has been (on a scale of 1-10, with 10 being severely anxious and 1 being not anxious at all)  Not anxious [] 1     [] 2     [] 3     [] 4   [] 5    [x] 6      [] 7   [] 8   [] 9       [] 10  Very anxious       At today's visit, pt reports that since our last visit, their average APPETITE has been    [] Decreased     [x] Normal/Unchanged   [] Increased      At today's visit, pt reports that since our last visit, their average HOURS OF SLEEP (every 24 hours) has been    [] 0-3   [] 4-5    [] 5-6    [] 6-7    [x] 8-9    [] 10-11   [] 11+    At today's visit, pt reports that since our last visit, their average Energy has been     [] Poor    [x] Average  [] Increased         Aggression:  [] yes  [x] no    Patient is [x] Able to contract for safety  [] unable to CONTRACT FOR SAFETY     ROS:  [x] All negative/unchanged except if checked. Explain positive(checked items) below:     [] Constitutional  [] Eyes  [] Ear/Nose/Mouth/Throat  [] Respiratory  [] CV  [] GI  []   [] Musculoskeletal  [] Skin/Breast  [] Neurological  [] Endocrine  [] Heme/Lymph  [] Allergic/Immunologic      MEDICATIONS:    Current Outpatient Medications:     VORTIoxetine (TRINTELLIX) 10 MG TABS tablet, Take 1 tablet by mouth daily, Disp: 30 tablet, Rfl: 1    FLUoxetine (PROZAC) 10 MG capsule, Take 3 capsules by mouth daily, Disp: 90 capsule, Rfl: 2    propranolol (INDERAL) 10 MG tablet, Take 1 tablet by mouth 3 times daily, Disp: 90 tablet, Rfl: 3    Examination:    Vitals: not taken in person, most recent vitals in chart reviewed  There were no vitals filed for this visit. Wt Readings from Last 3 Encounters:   10/07/20 175 lb (79.4 kg)       Labs:   no recent labs    Mental Status Examination:    Limited MSE 2/2 audio only phone appt  Speech:  spontaneous, normal rate and normal volume   Mood: \"better\"  Affect:  mood congruent  Thought processes:  linear and logical  Thought content:  Denies suicidal or homicidal ideation, denies auditory or visual hallucinations  Cognition:  no deficits in attention, concentration notable, recent memory grossly intact  Concentration intact  Memory intact  Insight good   Judgement fair   Fund of Knowledge adequate    Treatment Plan:  Reviewed current Medications with the patient. Education provided on the compliance with treatment. Reviewed OARRs, no concerns identified     The anticipated benefits and side effects of the medications, including the anticipated results of not receiving the medication, and of alternatives to the medications were explained to the patient and their informed consent was obtained for starting medications as well as adjusting the doses (titration or tapering) as indicated.  The above information was given by physician in verbal form and sufficient understanding was in evidence. The patient participated in discussion of the information and question and/or concerns were addressed before the medication was given. PSYCHOTHERAPY/COUNSELING:  Encourage patient to attend outpatient appointments and therapy. [x] Therapeutic interview  [] Supportive  [x] CBT  [x] Ongoing  [] Other    No follow-ups on file. Follow-up in 2 weeks, patient informed to call for follow-up    Please note this report has been partially produced using speech recognition software  And may cause contain errors related to that system including grammar, punctuation and spelling as well as words and phrases that may seem inappropriate. If there are questions or concerns please feel free to contact me to clarify. Adrienne Ventura MD  Electronically signed by Adrienne Ventura MD on 11/10/2020 at 11:26 AM  11/10/2020 11:26 AM    Psychiatry   Due to this being a TeleHealth encounter, evaluation of the following organ systems is limited: Vitals/Constitutional/EENT/Resp/CV/GI//MS/Neuro/Skin/Heme-Lymph-Imm. An  electronic signature was used to authenticate this note. --Adrienne Ventura MD on 11/10/2020 at 11:26 AM    Pursuant to the emergency declaration under the [facility] waiver authority and the [employer] and [employer] Act, this Virtual  Visit was conducted, with patient's consent, to reduce the patient's risk of exposure to COVID-19 and provide continuity of care for an established patient Services were provided through a video synchronous discussion virtually to substitute for in-person clinic visit.

## 2020-12-02 ENCOUNTER — VIRTUAL VISIT (OUTPATIENT)
Dept: BEHAVIORAL/MENTAL HEALTH CLINIC | Age: 35
End: 2020-12-02
Payer: COMMERCIAL

## 2020-12-02 DIAGNOSIS — F33.0 MILD EPISODE OF RECURRENT MAJOR DEPRESSIVE DISORDER (HCC): ICD-10-CM

## 2020-12-02 DIAGNOSIS — F41.1 GAD (GENERALIZED ANXIETY DISORDER): Primary | ICD-10-CM

## 2020-12-02 PROCEDURE — 99214 OFFICE O/P EST MOD 30 MIN: CPT | Performed by: PSYCHIATRY & NEUROLOGY

## 2020-12-02 PROCEDURE — G8428 CUR MEDS NOT DOCUMENT: HCPCS | Performed by: PSYCHIATRY & NEUROLOGY

## 2020-12-02 NOTE — PROGRESS NOTES
12/2/2020    TELEPHONE PSYCHIATRY FOLLOWUP -- Audio ONLY TELEPHONE APPT       Psychiatric Diagnoses:  1. [place]LLINGTON (generalized anxiety disorder)    2. Mild episode of recurrent major depressive disorder ([facility] [address]        This virtual visit was conducted via telephone visit with audio only with patient and provider. Patient gave verbal consent for teleservices and will sign a consent form when feasible. This visit lasted 30 minutes of time spent in patient encounter on the phone. Patient Location:       Home    Provider Location (City/State):        [place]    This note will not be viewable in [employer] for the following reason(s). Patient request to not have note available in [employer]. Due to COVID 19 outbreak, patient's office visit was converted to a telephone visit. Patient was contacted and agreed to proceed with a virtual visit via telephone  30 minutes with direct communication with patient for encounter The risks and benefits of converting to a virtual visit were discussed in light of the current infectious disease epidemic. Patient also understood that insurance coverage and co-pays are up to their individual insurance plans. Patient Location:        Patient's home address  Provider Location (City/State):        [address] [place] [address]        Assessment/Plan:   Discusssed testing accommodations   Patient has significant panic attacks and anxiety   Wants to trial a washout off prozac and is willing to continue at this time, he had considered stopping in the past     Told will fill out this form for time   [email or web address]     Medical Diagnoses:  Patient Active Problem List   Diagnosis    Adjustment disorder with mixed anxiety and depressed mood    Major depressive disorder, recurrent episode, moderate ([address]    WELLINGTON (generalized anxiety disorder)    Attention deficit hyperactivity disorder (ADHD), predominantly inattentive type           DATE and changes made ? 1/21/21  o Continue prozac for now, patient discussed trial of a washout     AT TODAY'S VISIT     1. No Labs ordered today   2. Crisis plan reviewed and patient verbally contracts for safety. Go to ED with emergent symptoms or safety concerns. 3. Risks, benefits, side effects of medications, including any / all black box warnings, discussed with patient, who verbalizes their understanding. Pt is lenin for safety and denies thoughts of SI/HI. Amenable to plan. No acute concerns to address regarding medications. Subjective:      Pt reports he had been diagnosed with COVID  Has been cleaning, studying     Patient reports they have been compliant with current medication regimen and have not missed a dose. Patient denies medication side effects. At today's visit, patient denies thoughts of harm to self or others since last appointment, and denies auditory or visual hallucinations.      At today's visit, pt reports that since our last visit, their average MOOD has been (on a scale of 1-10, with 1 being severely depressed and 10 being not depressed at all)  Very depressed [] 1  [] 2  [x] 3  [] 4  [] 5  [] 6  [] 7  [] 8  [] 9  [] 10  Not depressed    At today's visit, pt reports that since our last visit, their average ANXIETY has been (on a scale of 1-10, with 10 being severely anxious and 1 being not anxious at all)  Not anxious [] 1     [] 2     [] 3     [] 4   [] 5    [] 6      [x] 7   [] 8   [] 9       [] 10  Very anxious       At today's visit, pt reports that since our last visit, their average APPETITE has been    [] Decreased     [] Normal/Unchanged   [] Increased      At today's visit, pt reports that since our last visit, their average HOURS OF SLEEP (every 24 hours) has been    [] 0-3   [] 4-5    [] 5-6    [] 6-7    [] 8-9    [] 10-11   [] 11+    At today's visit, pt reports that since our last visit, their average Energy has been     [] Poor    [] Average  [] Increased Aggression:  [] yes  [x] no    Patient is [x] Able to contract for safety  [] unable to CONTRACT FOR SAFETY     ROS:  [x] All negative/unchanged except if checked. Explain positive(checked items) below:     [] Constitutional  [] Eyes  [] Ear/Nose/Mouth/Throat  [] Respiratory  [] CV  [] GI  []   [] Musculoskeletal  [] Skin/Breast  [] Neurological  [] Endocrine  [] Heme/Lymph  [] Allergic/Immunologic      MEDICATIONS:    Current Outpatient Medications:     FLUoxetine (PROZAC) 10 MG capsule, Take 3 capsules by mouth daily, Disp: 90 capsule, Rfl: 3    Dexmethylphenidate HCl ER (FOCALIN XR) 5 MG CP24, Take 5 mg by mouth daily for 30 days. , Disp: 30 capsule, Rfl: 0    Examination:    Vitals: not taken in person, most recent vitals in chart reviewed  There were no vitals filed for this visit. Wt Readings from Last 3 Encounters:   10/07/20 175 lb (79.4 kg)       Labs:   no recent labs    Mental Status Examination:    (Limited MSE as this is an audio-only telephone encounter)  Speech:  spontaneous, normal rate and normal volume   Mood: \"anxious\"  Affect:  mood congruent  Thought processes:  linear and logical  Thought content:  Denies suicidal or homicidal ideation, denies auditory or visual hallucinations  Cognition:  no deficits in attention, concentration notable, recent memory grossly intact  Concentration intact  Memory intact  Insight good   Judgement fair   Fund of Knowledge adequate    Treatment Plan:  Reviewed current Medications with the patient. Education provided on the compliance with treatment.    Reviewed OARRs, no concerns identified Pursuant to the emergency declaration under the [facility] waiver authority and the [employer] and [employer] Act, this Virtual  Visit was conducted, with patient's consent, to reduce the patient's risk of exposure to COVID-19 and provide continuity of care for an established patient Services were provided through a telephone discussion to substitute for in-person clinic visit.

## 2020-12-09 ENCOUNTER — TELEPHONE (OUTPATIENT)
Dept: BEHAVIORAL/MENTAL HEALTH CLINIC | Age: 35
End: 2020-12-09

## 2020-12-09 NOTE — TELEPHONE ENCOUNTER
Patient called to let you know that he decided to start taking 20 mg of Paxil again. I has some at home does not need a refill for a couple weeks.

## 2021-01-06 ENCOUNTER — VIRTUAL VISIT (OUTPATIENT)
Dept: BEHAVIORAL/MENTAL HEALTH CLINIC | Age: 36
End: 2021-01-06
Payer: COMMERCIAL

## 2021-01-06 DIAGNOSIS — F90.2 ATTENTION DEFICIT HYPERACTIVITY DISORDER (ADHD), COMBINED TYPE: Primary | ICD-10-CM

## 2021-01-06 DIAGNOSIS — F41.1 GAD (GENERALIZED ANXIETY DISORDER): ICD-10-CM

## 2021-01-06 DIAGNOSIS — F33.1 MAJOR DEPRESSIVE DISORDER, RECURRENT EPISODE, MODERATE (HCC): ICD-10-CM

## 2021-01-06 PROCEDURE — 99443 PR PHYS/QHP TELEPHONE EVALUATION 21-30 MIN: CPT | Performed by: PSYCHIATRY & NEUROLOGY

## 2021-01-06 RX ORDER — DEXMETHYLPHENIDATE HYDROCHLORIDE 5 MG/1
5 CAPSULE, EXTENDED RELEASE ORAL DAILY
Qty: 30 CAPSULE | Refills: 0 | Status: SHIPPED | OUTPATIENT
Start: 2021-01-06 | End: 2021-01-22

## 2021-01-06 RX ORDER — FLUOXETINE 10 MG/1
30 CAPSULE ORAL DAILY
Qty: 90 CAPSULE | Refills: 3 | Status: SHIPPED | OUTPATIENT
Start: 2021-01-06 | End: 2021-01-22

## 2021-01-06 NOTE — PROGRESS NOTES
1/6/2021    TELEPHONE PSYCHIATRY FOLLOWUP -- Audio ONLY TELEPHONE APPT       Psychiatric Diagnoses:  1. Attention deficit hyperactivity disorder (ADHD), combined type    2. Attention deficit hyperactivity disorder (ADHD), predominantly inattentive type        This virtual visit was conducted via telephone visit with audio only with patient and provider. Patient gave verbal consent for teleservices and will sign a consent form when feasible. This visit lasted 30 minutes of time spent in patient encounter on the phone. Patient Location:       Home    Provider Location (City/[facility]):        [place], [place]      Due to COVID 19 outbreak, patient's office visit was converted to a telephone visit. Patient was contacted and agreed to proceed with a virtual visit via telephone  30 minutes with direct communication with patient for encounter The risks and benefits of converting to a virtual visit were discussed in light of the current infectious disease epidemic. Patient also understood that insurance coverage and co-pays are up to their individual insurance plans. Patient Location:        Patient's home address  Provider Location (City/State):        [facility] [address]        Assessment/Plan:   New diagnosis of adhd given at this appointment, will start Focalin 5 mg QD. Patient will monitor sleep. Continue prozac which was recently restarted. Apply for accommodations    Medical Diagnoses:  Patient Active Problem List   Diagnosis    Adjustment disorder with mixed anxiety and depressed mood    Major depressive disorder, recurrent episode, moderate ([address]    WELLINGTON (generalized anxiety disorder)    Attention deficit hyperactivity disorder (ADHD), predominantly inattentive type           DATE and changes made   1/6/21  o Cont prozac  o START Focalin     AT TODAY'S VISIT     1. No Labs ordered today   2. Crisis plan reviewed and patient verbally contracts for safety.   Go to ED with emergent symptoms or safety concerns. 3. Risks, benefits, side effects of medications, including any / all black box warnings, discussed with patient, who verbalizes their understanding. Pt is lenin for safety and denies thoughts of SI/HI. Amenable to plan. No acute concerns to address regarding medications. Subjective:      Pt reports he had been more irritable off meds, has now gone back on then    Does feel better with prozac   Discussed symptoms of ADHD, which has been problematic since childhood:   Attention Deficit Hyperactivity Disorder (Patient endorses the following symptoms)   · inattention,   · hyperactivity,   · decreased focus,   · difficulty concentrating,   · frequently interrupts others,   · forgetfulness,   · difficulty following multiple step directions,   · disorganization,   · difficulty listening when spoken to directly,   · fidgets frequently    Discuss that he may benefit from testing accommodations for this     Patient reports they have been compliant with current medication regimen and have not missed a dose. Patient denies medication side effects. At today's visit, patient denies thoughts of harm to self or others since last appointment, and denies auditory or visual hallucinations.      At today's visit, pt reports that since our last visit, their average MOOD has been (on a scale of 1-10, with 1 being severely depressed and 10 being not depressed at all)  Very depressed [] 1  [] 2  [] 3  [x] 4  [] 5  [] 6  [] 7  [] 8  [] 9  [] 10  Not depressed    At today's visit, pt reports that since our last visit, their average ANXIETY has been (on a scale of 1-10, with 10 being severely anxious and 1 being not anxious at all)  Not anxious [] 1     [] 2     [] 3     [] 4   [] 5    [] 6      [x] 7   [] 8   [] 9       [] 10  Very anxious       At today's visit, pt reports that since our last visit, their average APPETITE has been    [] Decreased     [x] Normal/Unchanged   [] Increased      At today's visit, pt reports that since our last visit, their average HOURS OF SLEEP (every 24 hours) has been    [] 0-3   [] 4-5    [] 5-6    [] 6-7    [x] 8-9    [] 10-11   [] 11+    At today's visit, pt reports that since our last visit, their average Energy has been     [] Poor    [x] Average  [] Increased         Aggression:  [] yes  [x] no    Patient is [x] Able to contract for safety  [] unable to CONTRACT FOR SAFETY     ROS:  [x] All negative/unchanged except if checked. Explain positive(checked items) below:     [] Constitutional  [] Eyes  [] Ear/Nose/Mouth/Throat  [] Respiratory  [] CV  [] GI  []   [] Musculoskeletal  [] Skin/Breast  [] Neurological  [] Endocrine  [] Heme/Lymph  [] Allergic/Immunologic      MEDICATIONS:    Current Outpatient Medications:     FLUoxetine (PROZAC) 10 MG capsule, Take 3 capsules by mouth daily, Disp: 90 capsule, Rfl: 3    Dexmethylphenidate HCl ER (FOCALIN XR) 5 MG CP24, Take 5 mg by mouth daily for 30 days. , Disp: 30 capsule, Rfl: 0    Examination:    Vitals: not taken in person, most recent vitals in chart reviewed  There were no vitals filed for this visit. Wt Readings from Last 3 Encounters:   10/07/20 175 lb (79.4 kg)       Labs:   no recent labs    Mental Status Examination:    (Limited MSE as this is an audio-only telephone encounter)  Speech:  spontaneous, normal rate and normal volume   Mood: \"better\"  Affect:  mood congruent  Thought processes:  linear and logical  Thought content:  Denies suicidal or homicidal ideation, denies auditory or visual hallucinations  Cognition:  no deficits in attention, concentration notable, recent memory grossly intact  Concentration intact  Memory intact  Insight good   Judgement fair   Fund of Knowledge adequate    Treatment Plan:  Reviewed current Medications with the patient. Education provided on the compliance with treatment.    Reviewed OARRs, no concerns identified     The anticipated benefits and side effects of the medications, including the anticipated results of not receiving the medication, and of alternatives to the medications were explained to the patient and their informed consent was obtained for starting medications as well as adjusting the doses (titration or tapering) as indicated. The above information was given by physician in verbal form and sufficient understanding was in evidence. The patient participated in discussion of the information and question and/or concerns were addressed before the medication was given. PSYCHOTHERAPY/COUNSELING:  Encourage patient to attend outpatient appointments and therapy. [x] Therapeutic interview  [] Supportive  [x] CBT  [x] Ongoing  [] Other    No follow-ups on file. Follow-up in 2 weeks, patient informed to call for follow-up    Please note this report has been partially produced using speech recognition software  And may cause contain errors related to that system including grammar, punctuation and spelling as well as words and phrases that may seem inappropriate. If there are questions or concerns please feel free to contact me to clarify. Kristen Lopez MD  Electronically signed by Kristen Lopez MD on 1/11/2021 at 12:04 PM  1/11/2021 12:04 PM    Psychiatry   Due to this being a TeleHealth encounter, evaluation of the following organ systems is limited: Vitals/Constitutional/EENT/Resp/CV/GI//MS/Neuro/Skin/Heme-Lymph-Imm. An  electronic signature was used to authenticate this note.   --Kristen Lopez MD on 1/11/2021 at 12:04 PM    Pursuant to the emergency declaration under the [facility], [facility] waiver authority and the [employer] and [employer] Act, this Virtual  Visit was conducted, with patient's consent, to reduce the patient's risk of exposure to COVID-19 and provide continuity of care for an established patient Services were provided through a telephone discussion to substitute for in-person clinic visit.

## 2021-01-11 PROBLEM — F31.9 BIPOLAR 1 DISORDER (HCC): Status: RESOLVED | Noted: 2020-04-08 | Resolved: 2021-01-11

## 2021-01-11 PROBLEM — F90.0 ATTENTION DEFICIT HYPERACTIVITY DISORDER (ADHD), PREDOMINANTLY INATTENTIVE TYPE: Status: ACTIVE | Noted: 2021-01-11

## 2021-01-22 ENCOUNTER — VIRTUAL VISIT (OUTPATIENT)
Dept: BEHAVIORAL/MENTAL HEALTH CLINIC | Age: 36
End: 2021-01-22
Payer: COMMERCIAL

## 2021-01-22 DIAGNOSIS — F33.1 MODERATE EPISODE OF RECURRENT MAJOR DEPRESSIVE DISORDER (HCC): Primary | ICD-10-CM

## 2021-01-22 DIAGNOSIS — F90.0 ATTENTION DEFICIT HYPERACTIVITY DISORDER (ADHD), PREDOMINANTLY INATTENTIVE TYPE: ICD-10-CM

## 2021-01-22 DIAGNOSIS — F41.1 GAD (GENERALIZED ANXIETY DISORDER): ICD-10-CM

## 2021-01-22 PROCEDURE — 99443 PR PHYS/QHP TELEPHONE EVALUATION 21-30 MIN: CPT | Performed by: PSYCHIATRY & NEUROLOGY

## 2021-01-22 RX ORDER — ARIPIPRAZOLE 2 MG/1
2 TABLET ORAL DAILY
Qty: 30 TABLET | Refills: 3 | Status: SHIPPED | OUTPATIENT
Start: 2021-01-22 | End: 2021-05-14

## 2021-01-22 NOTE — PROGRESS NOTES
1/22/2021    TELEPHONE PSYCHIATRY FOLLOWUP -- Audio ONLY TELEPHONE APPT       Psychiatric Diagnoses:  1. Moderate episode of recurrent major depressive disorder ([facility] [address]    2. Attention deficit hyperactivity disorder (ADHD), predominantly inattentive type    3. WELL[place] (generalized anxiety disorder)        This virtual visit was conducted via telephone visit with audio only with patient and provider. Patient gave verbal consent for teleservices and will sign a consent form when feasible. This visit lasted 30 minutes of time spent in patient encounter on the phone. Patient Location:       Home    Provider Location ([facility]/State):        [place], [place]      Due to COVID 19 outbreak, patient's office visit was converted to a telephone visit. Patient was contacted and agreed to proceed with a virtual visit via telephone  30 minutes with direct communication with patient for encounter The risks and benefits of converting to a virtual visit were discussed in light of the current infectious disease epidemic. Patient also understood that insurance coverage and co-pays are up to their individual insurance plans. Patient Location:        Patient's home address  Provider Location (City/State):        [facility] [address]        Assessment/Plan:   START Rapid induction of zoloft 50 mg one week with abilify 2 mg for augmentation for severe anxiety which has continued to be problematic and is limiting functioning. Stop prozac. Stop focalin. Continues to have attention and focus problems. Mood symptoms are overall predominated by severe anxiety but patient denies depressed mood or suicidal mood.      Medical Diagnoses:  Patient Active Problem List   Diagnosis    Adjustment disorder with mixed anxiety and depressed mood    Major depressive disorder, recurrent episode, moderate (HCC)    [place]LL[place] (generalized anxiety disorder)    Attention deficit hyperactivity disorder (ADHD), predominantly inattentive type           DATE and changes made  Wellbutrin (made more irritable) has been on a Effexor for a year, lexapro, haldol for tic disorder that is resolved, Seroquel, Strattera, Vyvanse, Ritalin, Adderall, prozac, lithium, Latuda  · 1/6/21  ? Cont prozac  ? START Focalin   · 1/22  ? Zoloft 50 mg QD for one week then increase to 100 mg QD for one week and ADD abilify 2 mg QD         AT TODAY'S VISIT     1. No Labs ordered today   2. Crisis plan reviewed and patient verbally contracts for safety. Go to ED with emergent symptoms or safety concerns. 3. Risks, benefits, side effects of medications, including any / all black box warnings, discussed with patient, who verbalizes their understanding. Pt is lenin for safety and denies thoughts of SI/HI. Amenable to plan. No acute concerns to address regarding medications. Subjective:      Pt reports HR was elevated, felt SOB on adhd medication so only took this for four days   Will switch to zoloft    Patient reports they have been compliant with current medication regimen and have not missed a dose. Patient denies medication side effects. At today's visit, patient denies thoughts of harm to self or others since last appointment, and denies auditory or visual hallucinations.      At today's visit, pt reports that since our last visit, their average MOOD has been (on a scale of 1-10, with 1 being severely depressed and 10 being not depressed at all)  Very depressed [] 1  [] 2  [] 3  [] 4  [] 5  [] 6  [] 7  [x] 8  [] 9  [] 10  Not depressed    At today's visit, pt reports that since our last visit, their average ANXIETY has been (on a scale of 1-10, with 10 being severely anxious and 1 being not anxious at all)  Not anxious [] 1     [] 2     [x] 3     [] 4   [] 5    [] 6      [] 7   [] 8   [] 9       [] 10  Very anxious       At today's visit, pt reports that since our last visit, their average APPETITE has been    [] Decreased     [x] Normal/Unchanged   [] Increased      At today's visit, pt reports that since our last visit, their average HOURS OF SLEEP (every 24 hours) has been    [] 0-3   [] 4-5    [] 5-6    [] 6-7    [x] 8-9    [] 10-11   [] 11+    At today's visit, pt reports that since our last visit, their average Energy has been     [] Poor    [x] Average  [] Increased         Aggression:  [] yes  [x] no    Patient is [x] Able to contract for safety  [] unable to CONTRACT FOR SAFETY     ROS:  [x] All negative/unchanged except if checked. Explain positive(checked items) below:     [] Constitutional  [] Eyes  [] Ear/Nose/Mouth/Throat  [] Respiratory  [] CV  [] GI  []   [] Musculoskeletal  [] Skin/Breast  [] Neurological  [] Endocrine  [] Heme/Lymph  [] Allergic/Immunologic      MEDICATIONS:    Current Outpatient Medications:     ARIPiprazole (ABILIFY) 2 MG tablet, Take 1 tablet by mouth daily, Disp: 30 tablet, Rfl: 3    sertraline (ZOLOFT) 50 MG tablet, Take 1 tablet by mouth daily for 7 days, THEN 2 tablets daily for 21 days. , Disp: 49 tablet, Rfl: 0    Examination:    Vitals: not taken in person, most recent vitals in chart reviewed  There were no vitals filed for this visit. Wt Readings from Last 3 Encounters:   10/07/20 175 lb (79.4 kg)       Labs:   no recent labs    Mental Status Examination:    (Limited MSE as this is an audio-only telephone encounter)  Speech:  spontaneous, normal rate and normal volume   Mood: \"anxious\"  Affect:  mood congruent  Thought processes:  linear and logical  Thought content:  Denies suicidal or homicidal ideation, denies auditory or visual hallucinations  Cognition:  no deficits in attention, concentration notable, recent memory grossly intact  Concentration intact  Memory intact  Insight good   Judgement fair   Fund of Knowledge adequate    Treatment Plan:  Reviewed current Medications with the patient. Education provided on the compliance with treatment.    Reviewed OARRs, no concerns identified     The anticipated benefits and side effects of the medications, including the anticipated results of not receiving the medication, and of alternatives to the medications were explained to the patient and their informed consent was obtained for starting medications as well as adjusting the doses (titration or tapering) as indicated. The above information was given by physician in verbal form and sufficient understanding was in evidence. The patient participated in discussion of the information and question and/or concerns were addressed before the medication was given. PSYCHOTHERAPY/COUNSELING:  Encourage patient to attend outpatient appointments and therapy. [x] Therapeutic interview  [] Supportive  [x] CBT  [x] Ongoing  [] Other    No follow-ups on file. Follow-up in 2 weeks, patient informed to call for follow-up    Please note this report has been partially produced using speech recognition software  And may cause contain errors related to that system including grammar, punctuation and spelling as well as words and phrases that may seem inappropriate. If there are questions or concerns please feel free to contact me to clarify. Gale Baker MD  Electronically signed by Gale Baker MD on 1/22/2021 at 3:48 PM  1/22/2021 3:48 PM    Psychiatry   Due to this being a TeleHealth encounter, evaluation of the following organ systems is limited: Vitals/Constitutional/EENT/Resp/CV/GI//MS/Neuro/Skin/Heme-Lymph-Imm. An  electronic signature was used to authenticate this note.   --Gale Baker MD on 1/22/2021 at 3:48 PM    Pursuant to the emergency declaration under the [facility], [facility] waiver authority and the [employer] and [employer] Act, this Virtual  Visit was conducted, with patient's consent, to reduce the patient's risk of exposure to COVID-19 and provide continuity of care for an established patient Services were provided through a telephone discussion to substitute for in-person clinic visit.

## 2021-01-29 ENCOUNTER — TELEPHONE (OUTPATIENT)
Dept: BEHAVIORAL/MENTAL HEALTH CLINIC | Age: 36
End: 2021-01-29

## 2021-02-15 NOTE — TELEPHONE ENCOUNTER
REFILL REQUEST UPDATE FROM PHYSICIAN    Requested Prescriptions     Pending Prescriptions Disp Refills    sertraline (ZOLOFT) 50 MG tablet [Pharmacy Med Name: SERTRALINE HCL 50 MG TABLET] 49 tablet 0     Sig: TAKE 1 TABLET BY MOUTH DAILY FOR 7 DAYS, THEN 2 TABLETS DAILY FOR 21 DAYS. The following medications were refilled per patient request to the pharmacy identified as patient's preference pharmacy. Prior to refill, the [facility] has been reviewed as needed for controlled substance monitoring and no concerns were identified. No orders of the defined types were placed in this encounter. Please inform the patient of any pending lab work, so they may complete this prior to the next medication fill.      Kumar Doe MD

## 2021-02-18 RX ORDER — SERTRALINE HYDROCHLORIDE 100 MG/1
100 TABLET, FILM COATED ORAL DAILY
Qty: 30 TABLET | Refills: 2 | Status: SHIPPED | OUTPATIENT
Start: 2021-02-18 | End: 2021-02-24 | Stop reason: SDUPTHER

## 2021-02-24 RX ORDER — SERTRALINE HYDROCHLORIDE 100 MG/1
200 TABLET, FILM COATED ORAL DAILY
Qty: 60 TABLET | Refills: 2 | Status: SHIPPED | OUTPATIENT
Start: 2021-02-24 | End: 2021-04-16

## 2021-03-05 ENCOUNTER — VIRTUAL VISIT (OUTPATIENT)
Dept: BEHAVIORAL/MENTAL HEALTH CLINIC | Age: 36
End: 2021-03-05
Payer: COMMERCIAL

## 2021-03-05 DIAGNOSIS — F43.23 ADJUSTMENT DISORDER WITH MIXED ANXIETY AND DEPRESSED MOOD: ICD-10-CM

## 2021-03-05 DIAGNOSIS — F90.0 ATTENTION DEFICIT HYPERACTIVITY DISORDER (ADHD), PREDOMINANTLY INATTENTIVE TYPE: Primary | ICD-10-CM

## 2021-03-05 DIAGNOSIS — F41.1 GAD (GENERALIZED ANXIETY DISORDER): ICD-10-CM

## 2021-03-05 PROCEDURE — G8428 CUR MEDS NOT DOCUMENT: HCPCS | Performed by: PSYCHIATRY & NEUROLOGY

## 2021-03-05 PROCEDURE — 99214 OFFICE O/P EST MOD 30 MIN: CPT | Performed by: PSYCHIATRY & NEUROLOGY

## 2021-03-05 NOTE — PROGRESS NOTES
3/5/2021    TELEHEALTH PSYCHIATRY FOLLOWUP -- Audio/Visual (During [place]-72 public health emergency)      Psychiatric Diagnoses:  1. Attention deficit hyperactivity disorder (ADHD), predominantly inattentive type    2. Adjustment disorder with mixed anxiety and depressed mood    3. [place] (generalized anxiety disorder)          Due to COVID 23 outbreak, patient's office visit was converted to a virtual visit. Patient was contacted and agreed to proceed with a virtual visit via DOXY  30 minutes with direct communication with patient for encounter The risks and benefits of converting to a virtual visit were discussed in light of the current infectious disease epidemic. Patient also understood that insurance coverage and co-pays are up to their individual insurance plans. Patient Location:        Patient's home address  Provider Location (City/State):        [facility]        Assessment/Plan:   Patient doing well on current medication regimen. No changes. Mood improved, less depressed and more motivated. Less anxiety throughout the day, able to attend to ADLs. Continue to encourage physical activity and adherence to medication regimen. No acute concerns voiced. Medical Diagnoses:  Patient Active Problem List   Diagnosis    Adjustment disorder with mixed anxiety and depressed mood    Major depressive disorder, recurrent episode, moderate ([address]    [place]LL[place] (generalized anxiety disorder)    Attention deficit hyperactivity disorder (ADHD), predominantly inattentive type           DATE and changes made   3/5/21  o Doing well on zoloft 200 mg QD and abilify 2 mg, feeling better about upcoming test     AT TODAY'S VISIT     1. No Labs ordered today  2. Crisis plan reviewed and patient verbally contracts for safety. Go to ED with emergent symptoms or safety concerns. 3. Risks, benefits, side effects of medications, including any / all black box warnings, discussed with patient, who verbalizes their understanding. Pt is lenin for safety and denies thoughts of SI/HI. Amenable to plan. No acute concerns to address regarding medications. Subjective:      Patient denies medication side effects apart from those mentioned in the assessment and plan. Patient reports they have been compliant with current medication regimen and have not missed a dose. At today's visit, patient denies thoughts of harm to self or others since last appointment, and denies auditory or visual hallucinations. At today's visit, pt reports that since our last visit, their average MOOD has been (on a scale of 1-10, with 1 being severely depressed and 10 being not depressed at all          Very depressed [] 1  [] 2  [] 3  [] 4  [] 5  [] 6  [x] 7  [] 8  [] 9  [] 10  Not depressed    At today's visit, pt reports that since our last visit, their average ANXIETY has been (on a scale of 1-10, with 10 being severely anxious and 1 being not anxious at all)            Not anxious [] 1     [] 2     [] 3     [x] 4   [] 5    [] 6      [] 7   [] 8   [] 9       [] 10  Very anxious     At today's visit, pt reports that since our last visit, their average APPETITE has been  [] Decreased     [x] Normal/Unchanged   [] Increased      At today's visit, pt reports that since our last visit, their average HOURS OF SLEEP (every 24 hours) has been  [] 0-3   [] 4-5    [] 5-6    [] 6-7    [x] 8-9    [] 10-11   [] 11+    At today's visit, pt reports that since our last visit, their average Energy has been   [] Poor    [x] Average  [] Increased         Aggression:  [] yes  [x] no    Patient is [x] Able to contract for safety  [] unable to CONTRACT FOR SAFETY     ROS:  [x] All negative/unchanged except if checked.  Explain positive(checked items) below:     Physically feeling well   [] Constitutional  [] Eyes  [] Ear/Nose/Mouth/Throat  [] Respiratory  [] CV  [] GI  []   [] Musculoskeletal  [] Skin/Breast  [] Neurological  [] Endocrine  [] Heme/Lymph  [] Allergic/Immunologic      MEDICATIONS:    Current Outpatient Medications:     sertraline (ZOLOFT) 100 MG tablet, Take 2 tablets by mouth daily, Disp: 60 tablet, Rfl: 2    ARIPiprazole (ABILIFY) 2 MG tablet, Take 1 tablet by mouth daily, Disp: 30 tablet, Rfl: 3    Examination:    Vitals: not taken in person, most recent vitals in chart reviewed  There were no vitals filed for this visit. Wt Readings from Last 3 Encounters:   10/07/20 175 lb (79.4 kg)       Labs:   no recent labs    Mental Status Examination:    Level of consciousness:  alert and oriented to person, place, and situation  Appearance:  well-appearing good grooming and good hygiene  Behavior/Motor:  no abnormalities noted  Attitude toward examiner: friendly, pleasant and cooperative, attentive and good eye contact  Speech:  spontaneous, normal rate and normal volume   Mood: \"better\"  Affect:  mood congruent  Thought processes:  linear and logical  Thought content:  Denies suicidal or homicidal ideation, denies auditory or visual hallucinations  Cognition:  no deficits in attention, concentration notable, recent memory grossly intact  Concentration intact  Memory intact  Insight good   Judgement fair   Fund of Knowledge adequate    Treatment Plan:  Reviewed current Medications with the patient. Education provided on the compliance with treatment. Reviewed OARRs, no concerns identified     The anticipated benefits and side effects of the medications, including the anticipated results of not receiving the medication, and of alternatives to the medications were explained to the patient and their informed consent was obtained for starting medications as well as adjusting the doses (titration or tapering) as indicated. The above information was given by physician in verbal form and sufficient understanding was in evidence.  The patient participated in discussion of the information and question and/or concerns were addressed before the medication was given.       PSYCHOTHERAPY/COUNSELING:  Encourage patient to attend outpatient appointments and therapy. [x] Therapeutic interview  [] Supportive  [x] CBT  [x] Ongoing  [] Other    No follow-ups on file. Follow-up in 2 weeks, patient informed to call for follow-up    Please note this report has been partially produced using speech recognition software  And may cause contain errors related to that system including grammar, punctuation and spelling as well as words and phrases that may seem inappropriate. If there are questions or concerns please feel free to contact me to clarify. Og De Leon MD  Electronically signed by Og De Leon MD on 3/5/2021 at 1:01 PM  3/5/2021 1:01 PM    Psychiatry   Due to this being a TeleHealth encounter, evaluation of the following organ systems is limited: Vitals/Constitutional/EENT/Resp/CV/GI//MS/Neuro/Skin/Heme-Lymph-Imm. An  electronic signature was used to authenticate this note. --Og De Leon MD on 3/5/2021 at 1:01 PM    Pursuant to the emergency declaration under the [facility], [facility]5 waiver authority and the [employer] and [employer] Act, this Virtual  Visit was conducted, with patient's consent, to reduce the patient's risk of exposure to COVID-19 and provide continuity of care for an established patient Services were provided through a video synchronous discussion virtually to substitute for in-person clinic visit.

## 2021-03-06 NOTE — TELEPHONE ENCOUNTER
REFILL REQUEST UPDATE FROM PHYSICIAN    Requested Prescriptions     Signed Prescriptions Disp Refills    sertraline (ZOLOFT) 100 MG tablet 60 tablet 2     Sig: Take 2 tablets by mouth daily     Authorizing Provider: Adelita Mercado       The following medications were refilled per patient request to the pharmacy identified as patient's preference pharmacy. Prior to refill, the [facility] has been reviewed as needed for controlled substance monitoring and no concerns were identified. Orders Placed This Encounter   Medications    DISCONTD: sertraline (ZOLOFT) 100 MG tablet     Sig: Take 1 tablet by mouth daily     Dispense:  30 tablet     Refill:  2    sertraline (ZOLOFT) 100 MG tablet     Sig: Take 2 tablets by mouth daily     Dispense:  60 tablet     Refill:  2       Please inform the patient of any pending lab work, so they may complete this prior to the next medication fill.      Kumar Doe MD

## 2021-03-25 RX ORDER — FLUOXETINE 10 MG/1
CAPSULE ORAL
Qty: 270 CAPSULE | Refills: 1 | Status: SHIPPED | OUTPATIENT
Start: 2021-03-25 | End: 2021-06-09

## 2021-04-16 RX ORDER — SERTRALINE HYDROCHLORIDE 100 MG/1
TABLET, FILM COATED ORAL
Qty: 60 TABLET | Refills: 2 | Status: SHIPPED | OUTPATIENT
Start: 2021-04-16 | End: 2021-06-23

## 2021-05-04 ENCOUNTER — VIRTUAL VISIT (OUTPATIENT)
Dept: BEHAVIORAL/MENTAL HEALTH CLINIC | Age: 36
End: 2021-05-04
Payer: COMMERCIAL

## 2021-05-04 DIAGNOSIS — F41.1 GAD (GENERALIZED ANXIETY DISORDER): Primary | ICD-10-CM

## 2021-05-04 DIAGNOSIS — F33.1 MAJOR DEPRESSIVE DISORDER, RECURRENT EPISODE, MODERATE (HCC): ICD-10-CM

## 2021-05-04 PROCEDURE — 99443 PR PHYS/QHP TELEPHONE EVALUATION 21-30 MIN: CPT | Performed by: PSYCHIATRY & NEUROLOGY

## 2021-05-04 NOTE — PROGRESS NOTES
5/4/2021    TELEPHONE PSYCHIATRY FOLLOWUP -- Audio ONLY TELEPHONE APPT       Psychiatric Diagnoses:  1. WELLINGTON (generalized anxiety disorder)    2. Major depressive disorder, recurrent episode, moderate ([facility] [address]        This virtual visit was conducted via telephone visit with audio only with patient and provider. Patient gave verbal consent for teleservices and will sign a consent form when feasible. This visit lasted 30 minutes of time spent in patient encounter on the phone. Patient Location:       Home    Provider Location ([facility]):        [place], [place]      Due to COVID 19 outbreak, patient's office visit was converted to a telephone visit. Patient was contacted and agreed to proceed with a virtual visit via telephone  30 minutes with direct communication with patient for encounter The risks and benefits of converting to a virtual visit were discussed in light of the current infectious disease epidemic. Patient also understood that insurance coverage and co-pays are up to their individual insurance plans. Patient Location:        Patient's home address  Provider Location ([facility]/State):        [address] [place] [address]        Assessment/Plan:   Patient doing well on current medication regimen. No changes. Mood improved, less depressed and more motivated. Less anxiety throughout the day, able to attend to ADLs. Continue to encourage physical activity and adherence to medication regimen. No acute concerns voiced. DEPRESSION: Patient denies symptoms of low mood, low energy and low motivation, as well as anhedonia, but still motivated to complete necessary work and attend to ADLs. Denies suicidal ideation. Denies any thoughts of wishing they were dead. SLEEP DIFFICULTIES: Continues to struggle with sleep. Sleeping only 5 hours a night. h    ADHD: Continues to struggle with attention and focus has helped and we will increase the dose.  Has not had side effects from this (denies tachycardia, palpitations, or changes in appetite or sleep). GENERALIZED ANXIETY DISORDER: Continues to struggle with anxiety. has helped to decrease the frequency and severity of panic attacks and patient is noticing less overall anxiety. No side effects thus far (denies any orthostatic dizziness or falls, denies sedation). As anxiety continues to be a concern, we will increase the dose. Patient continues to report anxiety throughout the day, however able to attend to ADLs. Continue to encourage physical activity and adherence to medication regimen. No acute concerns voiced. Medical Diagnoses:  Patient Active Problem List   Diagnosis    Adjustment disorder with mixed anxiety and depressed mood    Major depressive disorder, recurrent episode, moderate ([facility] [address]    WELLINGTON (generalized anxiety disorder)    Attention deficit hyperactivity disorder (ADHD), predominantly inattentive type           DATE and changes made   5/4/21  o No changes to medications continue to study for examination    AT TODAY'S VISIT     1. No Labs ordered today   2. Crisis plan reviewed and patient verbally contracts for safety. Go to ED with emergent symptoms or safety concerns. 3. Risks, benefits, side effects of medications, including any / all black box warnings, discussed with patient, who verbalizes their understanding. Pt is lenin for safety and denies thoughts of SI/HI. Amenable to plan. No acute concerns to address regarding medications. Subjective:      Pt reports continues to worry about his upcoming examination     Patient reports they have been compliant with current medication regimen and have not missed a dose. Patient denies medication side effects. At today's visit, patient denies thoughts of harm to self or others since last appointment, and denies auditory or visual hallucinations.      At today's visit, pt reports that since our last visit, their average MOOD has been (on a scale of 1-10, with 1 being severely depressed and 10 being not depressed at all)  Very depressed [] 1  [] 2  [x] 3  [] 4  [] 5  [] 6  [] 7  [] 8  [] 9  [] 10  Not depressed    At today's visit, pt reports that since our last visit, their average ANXIETY has been (on a scale of 1-10, with 10 being severely anxious and 1 being not anxious at all)  Not anxious [] 1     [] 2     [] 3     [] 4   [] 5    [] 6      [x] 7   [] 8   [] 9       [] 10  Very anxious       At today's visit, pt reports that since our last visit, their average APPETITE has been    [] Decreased     [x] Normal/Unchanged   [] Increased      At today's visit, pt reports that since our last visit, their average HOURS OF SLEEP (every 24 hours) has been    [] 0-3   [] 4-5    [] 5-6    [] 6-7    [x] 8-9    [] 10-11   [] 11+    At today's visit, pt reports that since our last visit, their average Energy has been     [] Poor    [x] Average  [] Increased         Aggression:  [] yes  [x] no    Patient is [x] Able to contract for safety  [] unable to CONTRACT FOR SAFETY     ROS:  [x] All negative/unchanged except if checked. Explain positive(checked items) below:     [] Constitutional  [] Eyes  [] Ear/Nose/Mouth/Throat  [] Respiratory  [] CV  [] GI  []   [] Musculoskeletal  [] Skin/Breast  [] Neurological  [] Endocrine  [] Heme/Lymph  [] Allergic/Immunologic      MEDICATIONS:    Current Outpatient Medications:     ARIPiprazole (ABILIFY) 2 MG tablet, TAKE 1 TABLET BY MOUTH EVERY DAY, Disp: 90 tablet, Rfl: 1    sertraline (ZOLOFT) 100 MG tablet, TAKE 2 TABLETS BY MOUTH EVERY DAY, Disp: 60 tablet, Rfl: 2    FLUoxetine (PROZAC) 10 MG capsule, TAKE 3 CAPSULES BY MOUTH EVERY DAY, Disp: 270 capsule, Rfl: 1    Examination:    Vitals: not taken in person, most recent vitals in chart reviewed  There were no vitals filed for this visit.     Wt Readings from Last 3 Encounters:   10/07/20 175 lb (79.4 kg)       Labs:   no recent labs    Mental Status Examination:    (Limited MSE as this is an audio-only telephone encounter)  Speech:  spontaneous, normal rate and normal volume   Mood: \"better\"  Affect:  mood congruent  Thought processes:  linear and logical  Thought content:  Denies suicidal or homicidal ideation, denies auditory or visual hallucinations  Cognition:  no deficits in attention, concentration notable, recent memory grossly intact  Concentration intact  Memory intact  Insight good   Judgement fair   Fund of Knowledge adequate    Treatment Plan:  Reviewed current Medications with the patient. Education provided on the compliance with treatment. Reviewed OARRs, no concerns identified     The anticipated benefits and side effects of the medications, including the anticipated results of not receiving the medication, and of alternatives to the medications were explained to the patient and their informed consent was obtained for starting medications as well as adjusting the doses (titration or tapering) as indicated. The above information was given by physician in verbal form and sufficient understanding was in evidence. The patient participated in discussion of the information and question and/or concerns were addressed before the medication was given. PSYCHOTHERAPY/COUNSELING:  Encourage patient to attend outpatient appointments and therapy. [x] Therapeutic interview  [] Supportive  [x] CBT  [x] Ongoing  [] Other    No follow-ups on file. Follow-up in 2 weeks, patient informed to call for follow-up    Please note this report has been partially produced using speech recognition software  And may cause contain errors related to that system including grammar, punctuation and spelling as well as words and phrases that may seem inappropriate. If there are questions or concerns please feel free to contact me to clarify.     Elizabeth Deshpande MD  Electronically signed by Elizabeth Deshpande MD on 6/7/2021 at 11:48 AM  6/7/2021 11:48 AM    Psychiatry   Due to this being a TeleHealth encounter, evaluation of the following organ systems is limited: Vitals/Constitutional/EENT/Resp/CV/GI//MS/Neuro/Skin/Heme-Lymph-Imm. An  electronic signature was used to authenticate this note. --Volodymyr Lo MD on 6/7/2021 at 11:48 AM    Pursuant to the emergency declaration under the [address] [place] [address] waiver authority and the [employer] Act, this Virtual  Visit was conducted, with patient's consent, to reduce the patient's risk of exposure to COVID-19 and provide continuity of care for an established patient Services were provided through a telephone discussion to substitute for in-person clinic visit.

## 2021-05-14 RX ORDER — ARIPIPRAZOLE 2 MG/1
TABLET ORAL
Qty: 90 TABLET | Refills: 1 | Status: SHIPPED | OUTPATIENT
Start: 2021-05-14 | End: 2021-06-23

## 2021-06-09 ENCOUNTER — OFFICE VISIT (OUTPATIENT)
Dept: BEHAVIORAL/MENTAL HEALTH CLINIC | Age: 36
End: 2021-06-09
Payer: COMMERCIAL

## 2021-06-09 DIAGNOSIS — F33.1 MODERATE EPISODE OF RECURRENT MAJOR DEPRESSIVE DISORDER (HCC): ICD-10-CM

## 2021-06-09 DIAGNOSIS — F41.1 GAD (GENERALIZED ANXIETY DISORDER): Primary | ICD-10-CM

## 2021-06-09 PROCEDURE — 99443 PR PHYS/QHP TELEPHONE EVALUATION 21-30 MIN: CPT | Performed by: PSYCHIATRY & NEUROLOGY

## 2021-06-09 RX ORDER — LORAZEPAM 0.5 MG/1
0.5 TABLET ORAL EVERY 8 HOURS PRN
Qty: 14 TABLET | Refills: 2 | Status: SHIPPED | OUTPATIENT
Start: 2021-06-09 | End: 2021-06-23

## 2021-06-09 NOTE — PROGRESS NOTES
6/9/2021    40 mins total for this encounter = 30 minutes with direct communication with patient for encounter as well as 10 mins spent on chart review, and in the ordering of all necessary labs and/or medications  This virtual visit was conducted via telephone visit with audio only with patient and provider. Patient gave verbal consent for teleservices and will sign a consent form when feasible. This visit lasted 30 minutes of time spent in patient encounter on the phone. Patient Location:       Home    Provider Location ([facility]/State):        [address]     Psychiatric Diagnoses:  1. WELLINGTON (generalized anxiety disorder)    2. Moderate episode of recurrent major depressive disorder (HCC)        Assessment/Plan:   Patient cancelled his examination due to anxiety. Will increase sertraline (Zoloft). Patient denies thoughts of harm to self or others. No acute safety concerns voiced at this appointment. MOOD: WORSENED SYMPTOMS OF DEPRESSION: Patient reports worsening of symptoms of low mood, low energy and low motivation, as well as anhedonia, struggling to be motivated to complete necessary work and attend to activities of daily living. SLEEP: No concerns related to sleep    ATTENTION AND FOCUS: No concerns. No recent issues related to difficulty with attention or focus. ANXIETY: CONTINUED SIGNIFICANT CONCERNS: Patient reports continued symptoms of anxiety, which are largely unchanged. Anxiety continues to cause difficulty in interpersonal relationships and with performing duties at work. BIPOLAR SHENA: DENIES: Patient denies recent symptoms of shena including racing thoughts, increased goal-directed activity, decreased need for sleep, increased energy, persistently elevated or irritable mood, impulsivity, grandiosity, paranoid thoughts or other signs of shena. SUBSTANCE USE: No concerns related to substance use. Not applicable. ASSESSMENT/PLAN: Medication changes needed given the current presentation of symptoms. Patient was amenable to plan related to medications and endorsed understanding of the risks and benefits, which were explained and discussed. No acute concerns. No thoughts of harm to self or others voiced. COUNSELING or THERAPY:   Continue to encourage therapy as part of ongoing treatment of their mental health concerns. Continue to encourage physical activity and adherence to medication regimen. DATE and changes made   6/9/21  o sertraline (Zoloft) 100 mg daily   - Increase to 150 mg daily then 200 mg daily (patient was supposed to be taking this way)   o aripiprazole (Abilify) 2 mg daily  o START lorazepam (Ativan) 0.5 mg twice daily as needed for anxiety        Medical Diagnoses:  Patient Active Problem List   Diagnosis    Adjustment disorder with mixed anxiety and depressed mood    Major depressive disorder, recurrent episode, moderate ([address]    WELLINGTON (generalized anxiety disorder)    Attention deficit hyperactivity disorder (ADHD), predominantly inattentive type         AT TODAY'S VISIT     Crisis plan reviewed and patient verbally contracts for safety. Go to ED with emergent symptoms or safety concerns. Risks, benefits, side effects of medications, including any / all black box warnings, discussed with patient, who verbalizes their understanding. Pt is lenin for safety and denies thoughts of SI/HI. Amenable to plan. No acute concerns to address regarding medications. Subjective:      Patient denies medication side effects apart from those mentioned in the assessment and plan. Patient reports they have been compliant with current medication regimen and have not missed a dose. At today's visit, patient denies thoughts of harm to self or others since last appointment, and denies auditory or visual hallucinations. ROS:  [x] All negative/unchanged except if checked.  Explain positive(checked items) below:       Denies any new or changed physical symptoms other than those noted in the subjective portion of this note   [] Constitutional  [] Eyes  [] Ear/Nose/Mouth/Throat  [] Respiratory  [] CV  [] GI  []   [] Musculoskeletal  [] Skin/Breast  [] Neurological  [] Endocrine  [] Heme/Lymph  [] Allergic/Immunologic    OBJECTIVE:   No results found for this or any previous visit (from the past 1008 hour(s)). MEDICATIONS:    Current Outpatient Medications:     LORazepam (ATIVAN) 0.5 MG tablet, Take 1 tablet by mouth every 8 hours as needed for Anxiety for up to 14 days. , Disp: 14 tablet, Rfl: 2    ARIPiprazole (ABILIFY) 2 MG tablet, TAKE 1 TABLET BY MOUTH EVERY DAY, Disp: 90 tablet, Rfl: 1    sertraline (ZOLOFT) 100 MG tablet, TAKE 2 TABLETS BY MOUTH EVERY DAY, Disp: 60 tablet, Rfl: 2    Examination:    Vitals: not taken in person, most recent vitals in chart reviewed  There were no vitals filed for this visit. Wt Readings from Last 3 Encounters:   10/07/20 175 lb (79.4 kg)       BP Readings from Last 3 Encounters:   10/07/20 112/68         Mental Status Examination:    Limited mental status examination due to telelphone audio only encounter   Speech:  spontaneous, normal rate and normal volume   Mood: \"down\"  Affect:  mood congruent  Thought processes:  linear and logical  Thought content:  Denies suicidal or homicidal ideation, denies auditory or visual hallucinations  Cognition:  no deficits in attention, concentration notable, recent memory grossly intact  Concentration intact  Memory intact  Insight good   Judgement fair   Fund of Knowledge adequate    PSYCHOTHERAPY/COUNSELING:  Encourage patient to attend outpatient appointments and therapy. [x] Therapeutic interview  [] Supportive  [x] CBT  [x] Ongoing  [] Other    No follow-ups on file.  patient informed to call for follow-up or to reschedule appointment     Please note this report has been partially produced using speech recognition software  And may cause contain errors related to that system including grammar, punctuation and spelling as well as words and phrases that may seem inappropriate. If there are questions or concerns please feel free to contact me to clarify. Christina Elizabeth MD  Electronically signed by Christina Elizabeth MD on 6/9/2021 at 2:27 PM  6/9/2021 2:27 PM    Psychiatry   Due to this being a TeleHealth encounter, evaluation of the following organ systems is limited: Vitals/Constitutional/EENT/Resp/CV/GI//MS/Neuro/Skin/Heme-Lymph-Imm. An  electronic signature was used to authenticate this note. --Christina Elizabeth MD on 6/9/2021 at 2:27 PM    Pursuant to the emergency declaration under the [facility] waiver authority and the [email or web address] and [employer] Act, this Virtual  Visit was conducted, with patient's consent, to reduce the patient's risk of exposure to COVID-19 and provide continuity of care for an established patient Services were provided through a video synchronous discussion virtually to substitute for in-person clinic visit. Treatment Plan:  Reviewed current Medications with the patient. Education provided on the compliance with treatment.    Reviewed OARRs, no concerns identified

## 2021-06-23 ENCOUNTER — VIRTUAL VISIT (OUTPATIENT)
Dept: BEHAVIORAL/MENTAL HEALTH CLINIC | Age: 36
End: 2021-06-23
Payer: COMMERCIAL

## 2021-06-23 DIAGNOSIS — F90.0 ATTENTION DEFICIT HYPERACTIVITY DISORDER (ADHD), PREDOMINANTLY INATTENTIVE TYPE: ICD-10-CM

## 2021-06-23 DIAGNOSIS — F41.1 GAD (GENERALIZED ANXIETY DISORDER): ICD-10-CM

## 2021-06-23 DIAGNOSIS — F33.1 MAJOR DEPRESSIVE DISORDER, RECURRENT EPISODE, MODERATE (HCC): Primary | ICD-10-CM

## 2021-06-23 PROCEDURE — 99443 PR PHYS/QHP TELEPHONE EVALUATION 21-30 MIN: CPT | Performed by: PSYCHIATRY & NEUROLOGY

## 2021-06-23 RX ORDER — SERTRALINE HYDROCHLORIDE 100 MG/1
TABLET, FILM COATED ORAL
Qty: 11 TABLET | Refills: 0 | Status: SHIPPED | OUTPATIENT
Start: 2021-06-23 | End: 2021-11-17

## 2021-06-23 RX ORDER — ARIPIPRAZOLE 5 MG/1
5 TABLET ORAL DAILY
Qty: 30 TABLET | Refills: 3 | Status: SHIPPED | OUTPATIENT
Start: 2021-06-23 | End: 2021-08-10

## 2021-06-23 RX ORDER — FLUOXETINE HYDROCHLORIDE 20 MG/1
CAPSULE ORAL
Qty: 30 CAPSULE | Refills: 3 | Status: SHIPPED | OUTPATIENT
Start: 2021-06-23 | End: 2021-08-10 | Stop reason: SDUPTHER

## 2021-06-23 NOTE — PROGRESS NOTES
6/23/2021    TELEPHONE PSYCHIATRY FOLLOWUP -- Audio ONLY TELEPHONE APPT       Psychiatric Diagnoses:  1. Major depressive disorder, recurrent episode, moderate ([address]    2. WELL[place] (generalized anxiety disorder)    3. Attention deficit hyperactivity disorder (ADHD), predominantly inattentive type        This virtual visit was conducted via telephone visit with audio only with patient and provider. Patient gave verbal consent for teleservices and will sign a consent form when feasible. This visit lasted 30 minutes of time spent in patient encounter on the phone. Patient Location:       Home    Provider Location ([facility]/State):        [place], [place]      Due to COVID 19 outbreak, patient's office visit was converted to a telephone visit. Patient was contacted and agreed to proceed with a virtual visit via telephone  30 minutes with direct communication with patient for encounter The risks and benefits of converting to a virtual visit were discussed in light of the current infectious disease epidemic. Patient also understood that insurance coverage and co-pays are up to their individual insurance plans. Patient Location:        Patient's home address  Provider Location ([facility]):        [facility]:    Patient reports feeling worse with sertraline (Zoloft) dose increase, more tired, sleeping more, mood is more down. Discussed with patient transcranial magnetic stimulation ([facility]) or electroconvulsive therapy (ECT). He prefers medication changes at this point. States he feels very poor motivation, feels bad about this test.     Sho Valdivia [phone] Says patient has been very down (mom was handed phone, patient asked if I would talk with mom) and very flat, no motivation no energy and not sleeping. ASSESSMENT/PLAN: Medication changes needed given the current presentation of symptoms.  Patient was amenable to plan related to medications and endorsed understanding of the risks and benefits, which were explained and discussed. No acute concerns. No thoughts of harm to self or others voiced. MOOD: WORSENED SYMPTOMS OF DEPRESSION: Patient reports worsening of symptoms of low mood, low energy and low motivation, as well as anhedonia, struggling to be motivated to complete necessary work and attend to activities of daily living. SLEEP: IMPROVED: Sleeping better, continues to struggle with sleep somewhat. Sleeping 10-12 hours a night. ATTENTION AND FOCUS: No concerns. No recent issues related to difficulty with attention or focus. ANXIETY: Patient reported no concerns related to anxiety. BIPOLAR SHENA: NO SHENA/HYPOMANIA REPORTED: Patient denies recent symptoms of shena including racing thoughts, increased goal-directed activity, decreased need for sleep, increased energy, persistently elevated or irritable mood, impulsivity, grandiosity, paranoid thoughts or other signs of shena. SUBSTANCE USE: No concerns related to substance use. Not applicable. Wellbutrin (made more irritable) has been on a Effexor for a year, lexapro, haldol for tic disorder that is resolved, Seroquel, Strattera, Vyvanse, Ritalin, Adderall, prozac, lithium, Latuda, vortioxetine (Trintellix), and sertraline (Zoloft) and Abilify (aripiprazole)      COUNSELING or THERAPY:   Continue to encourage therapy as part of ongoing treatment of their mental health concerns. Continue to encourage physical activity and adherence to medication regimen. No acute concerns voiced.         Medical Diagnoses:  Patient Active Problem List   Diagnosis    Adjustment disorder with mixed anxiety and depressed mood    Major depressive disorder, recurrent episode, moderate ([address]    WELLINGTON (generalized anxiety disorder)    Attention deficit hyperactivity disorder (ADHD), predominantly inattentive type           DATE and changes made   6/23  o Increase Abilify (aripiprazole)   o Cross-taper sertraline (Zoloft) to fluoxetine (Prozac) for efficacy  o Recommended transcranial magnetic stimulation (TMS)/ECT     AT TODAY'S VISIT     1. No Labs ordered today   2. Crisis plan reviewed and patient verbally contracts for safety. Go to ED with emergent symptoms or safety concerns. 3. Risks, benefits, side effects of medications, including any / all black box warnings, discussed with patient, who verbalizes their understanding. Pt is lenin for safety and denies thoughts of SI/HI. Amenable to plan. No acute concerns to address regarding medications. Subjective:      Patient reports they have been compliant with current medication regimen and have not missed a dose. Patient denies medication side effects. At today's visit, patient denies thoughts of harm to self or others since last appointment, and denies auditory or visual hallucinations.      At today's visit, pt reports that since our last visit, their average MOOD has been (on a scale of 1-10, with 1 being severely depressed and 10 being not depressed at all)  Very depressed [] 1  [] 2  [x] 3  [] 4  [] 5  [] 6  [] 7  [] 8  [] 9  [] 10  Not depressed    At today's visit, pt reports that since our last visit, their average ANXIETY has been (on a scale of 1-10, with 10 being severely anxious and 1 being not anxious at all)  Not anxious [] 1     [] 2     [x] 3     [] 4   [] 5    [] 6      [] 7   [] 8   [] 9       [] 10  Very anxious       At today's visit, pt reports that since our last visit, their average APPETITE has been    [] Decreased     [x] Normal/Unchanged   [] Increased      At today's visit, pt reports that since our last visit, their average HOURS OF SLEEP (every 24 hours) has been    [] 0-3   [] 4-5    [] 5-6    [] 6-7    [x] 8-9    [] 10-11   [] 11+    At today's visit, pt reports that since our last visit, their average Energy has been     [] Poor    [x] Average  [] Increased         Aggression:  [] yes  [x] no    Patient is [x] Able to contract for safety  [] unable to CONTRACT FOR SAFETY     ROS:  [x] All negative/unchanged except if checked. Explain positive(checked items) below:     [] Constitutional  [] Eyes  [] Ear/Nose/Mouth/Throat  [] Respiratory  [] CV  [] GI  []   [] Musculoskeletal  [] Skin/Breast  [] Neurological  [] Endocrine  [] Heme/Lymph  [] Allergic/Immunologic      MEDICATIONS:    Current Outpatient Medications:     ARIPiprazole (ABILIFY) 5 MG tablet, Take 1 tablet by mouth daily, Disp: 30 tablet, Rfl: 3    sertraline (ZOLOFT) 100 MG tablet, Take 1 tablet by mouth daily for 7 days, THEN 0.5 tablets daily for 7 days. Then off sertraline (Zoloft) after cross taper complete., Disp: 11 tablet, Rfl: 0    FLUoxetine (PROZAC) 20 MG capsule, Take 1 capsule by mouth daily for 14 days, THEN 2 capsules daily for 14 days. , Disp: 30 capsule, Rfl: 3    LORazepam (ATIVAN) 0.5 MG tablet, Take 1 tablet by mouth every 8 hours as needed for Anxiety for up to 14 days. , Disp: 14 tablet, Rfl: 2    Examination:    Vitals: not taken in person, most recent vitals in chart reviewed  There were no vitals filed for this visit. Wt Readings from Last 3 Encounters:   10/07/20 175 lb (79.4 kg)       Labs:   no recent labs    Mental Status Examination:    (Limited MSE as this is an audio-only telephone encounter)  Speech:  spontaneous, normal rate and normal volume   Mood: \"bad\"  Affect:  mood congruent  Thought processes:  linear and logical  Thought content:  Denies suicidal or homicidal ideation, denies auditory or visual hallucinations  Cognition:  no deficits in attention, concentration notable, recent memory grossly intact  Concentration intact  Memory intact  Insight good   Judgement fair   Fund of Knowledge adequate    Treatment Plan:  Reviewed current Medications with the patient. Education provided on the compliance with treatment.    Reviewed OARRs, no concerns identified     The anticipated benefits and side effects of the medications, including the anticipated results of not receiving the medication, and of alternatives to the medications were explained to the patient and their informed consent was obtained for starting medications as well as adjusting the doses (titration or tapering) as indicated. The above information was given by physician in verbal form and sufficient understanding was in evidence. The patient participated in discussion of the information and question and/or concerns were addressed before the medication was given. PSYCHOTHERAPY/COUNSELING:  Encourage patient to attend outpatient appointments and therapy. [x] Therapeutic interview  [] Supportive  [x] CBT  [x] Ongoing  [] Other    No follow-ups on file. Follow-up in 2 weeks, patient informed to call for follow-up    Please note this report has been partially produced using speech recognition software  And may cause contain errors related to that system including grammar, punctuation and spelling as well as words and phrases that may seem inappropriate. If there are questions or concerns please feel free to contact me to clarify. Mely Rosenthal MD  Electronically signed by Mely Rosenthal MD on 6/23/2021 at 5:11 PM  6/23/2021 5:11 PM    Psychiatry   Due to this being a TeleHealth encounter, evaluation of the following organ systems is limited: Vitals/Constitutional/EENT/Resp/CV/GI//MS/Neuro/Skin/Heme-Lymph-Imm. An  electronic signature was used to authenticate this note.   --Mely Rosenthal MD on 6/23/2021 at 5:11 PM    Pursuant to the emergency declaration under the [facility] waiver authority and the [employer] Act, this Virtual  Visit was conducted, with patient's consent, to reduce the patient's risk of exposure to COVID-19 and provide continuity of care for an established patient Services were provided through a telephone discussion to substitute for in-person clinic

## 2021-07-08 ENCOUNTER — TELEPHONE (OUTPATIENT)
Dept: BEHAVIORAL/MENTAL HEALTH CLINIC | Age: 36
End: 2021-07-08

## 2021-07-08 NOTE — TELEPHONE ENCOUNTER
Dr. Arturo Nicole,    Patient's mom, Nadeen Sánchez,  called office in regards to being concerned about going out of town on Monday for two weeks. Patient is going to be at home alone. She is asking for advise on how to make sure Ben Mora will be ok for two weeks. She asked her sister to check on him but the sister wants \"a plan\". Nadeen Sánchez wants to know if you will call and speak to her briefly  about \"a plan\". Please advise.

## 2021-07-13 NOTE — TELEPHONE ENCOUNTER
"Tram Bal is a 36 year old female who is being evaluated via a billable video visit.      The patient has been notified of following:     \"This video visit will be conducted via a call between you and your physician/provider. We have found that certain health care needs can be provided without the need for an in-person physical exam.  This service lets us provide the care you need with a video conversation.  If a prescription is necessary we can send it directly to your pharmacy.  If lab work is needed we can place an order for that and you can then stop by our lab to have the test done at a later time.    Video visits are billed at different rates depending on your insurance coverage.  Please reach out to your insurance provider with any questions.    If during the course of the call the physician/provider feels a video visit is not appropriate, you will not be charged for this service.\"    Patient has given verbal consent for Video visit? Yes  How would you like to obtain your AVS? MyChart  If you are dropped from the video visit, the video invite should be resent to: Text to cell phone: [phone]  Will anyone else be joining your video visit? No    Subjective     Tram Bal is a 36 year old female who presents today via video visit for the following health issues:    HPI     Concern for COVID-19  About how many days ago did these symptoms start? Last night  Is this your first visit for this illness? Yes  In the 14 days before your symptoms started, have you had close contact with someone with COVID-19 (Coronavirus)? I do not know. (works with public)   Do you have a fever or chills? No 99.5 this am   Are you having new or worsening difficulty breathing? Yes   Please describe what kind of difficulty you are having breathing:Mild dyspnea (decreased exercise tolerance, able to do ADLs without difficulty)  Do you have new or worsening cough? Yes, it's a dry cough.   -mild   Have you had any new or " LMOM to call office unexplained body aches? No    Have you experienced any of the following NEW symptoms?    Headache: YES    Sore throat: No    Loss of taste or smell: No    Chest pain: No    Diarrhea: No    Rash: No  What treatments have you tried? None   Who do you live with? Two daughters   Are you, or a household member, a healthcare worker or a ? No  Do you live in a nursing home, group home, or shelter? No  Do you have a way to get food/medications if quarantined? Yes, I have a friend or family member who can help me.    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      Objective           Vitals:  No vitals were obtained today due to virtual visit.    Physical Exam     GENERAL: Healthy, alert and no distress  EYES: Eyes grossly normal to inspection.  No discharge or erythema, or obvious scleral/conjunctival abnormalities.  RESP: No audible wheeze, cough, or visible cyanosis.  No visible retractions or increased work of breathing.    SKIN: Visible skin clear. No significant rash, abnormal pigmentation or lesions.  NEURO: Cranial nerves grossly intact.  Mentation and speech appropriate for age.  PSYCH: Mentation appears normal, affect normal/bright, judgement and insight intact, normal speech and appearance well-groomed.          Assessment & Plan       Viral URI with cough  No acute distress.  With symptoms recommend testing for COVID 19.  Quarantine recommended and information provided.  Symptomatic care and follow up discussed.  - Asymptomatic COVID-19 Virus (Coronavirus) by PCR; Future     Home care instructions were reviewed with the patient. The risks, benefits and treatment options of prescribed medications or other treatments have been discussed with the patient. The patient verbalized their understanding and should call or  "follow up if no improvement or if they develop further problems.    Patient Instructions   Discharge Instructions for COVID-19 Patients  You have--or may have--COVID-19. Please follow the instructions listed below.   If you have a weakened immune system, discuss with your doctor any other actions you need to take.  How can I protect others?  If you have symptoms (fever, cough, body aches or trouble breathing):    Stay home and away from others (self-isolate) until:  ? At least 10 days have passed since your symptoms started, And   ? You've had no fever--and no medicine that reduces fever--for 1 full day (24 hours), And    ? Your other symptoms have resolved (gotten better).  If you don't show symptoms, but testing showed that you have COVID-19:    Stay home and away from others (self-isolate). Follow the tips under \"How do I self-isolate?\" below for 10 days (20 days if you have a weak immune system).    You don't need to be retested for COVID-19 before going back to school or work. As long as you're fever-free and feeling better, you can go back to school, work and other activities after waiting the 10 or 20 days.   How do I self-isolate?    Stay in your own room, even for meals. Use your own bathroom if you can.    Stay away from others in your home. No hugging, kissing or shaking hands. No visitors.    Don't go to work, school or anywhere else.    Clean \"high touch\" surfaces often (doorknobs, counters, handles). Use household cleaning spray or wipes. You'll find a full list of  on the EPA website: www.epa.gov/pesticide-registration/list-n-disinfectants-use-against-sars-cov-2.    Cover your mouth and nose with a mask or other face covering to avoid spreading germs.    Wash your hands and face often. Use soap and water.    Caregivers in these groups are at risk for severe illness due to COVID-19:  ? People 65 years and older  ? People who live in a nursing home or long-term care facility  ? People with " chronic disease (lung, heart, cancer, diabetes, kidney, liver, immunologic)  ? People who have a weakened immune system, including those who:    Are in cancer treatment    Take medicine that weakens the immune system, such as corticosteroids    Had a bone marrow or organ transplant    Have an immune deficiency    Have poorly controlled HIV or AIDS    Are obese (body mass index of 40 or higher)    Smoke regularly    Caregivers should wear gloves while washing dishes, handling laundry and cleaning bedrooms and bathrooms.    Use caution when washing and drying laundry: Don't shake dirty laundry and use the warmest water setting that you can.    For more tips on managing your health at home, go to www.cdc.gov/coronavirus/2019-ncov/downloads/10Things.pdf.  How can I take care of myself at home?  1. Get lots of rest. Drink extra fluids (unless a doctor has told you not to).    2. Take Tylenol (acetaminophen) for fever or pain. If you have liver or kidney problems, ask your family doctor if it's okay to take Tylenol.     Adults can take either:  ? 650 mg (two 325 mg pills) every 4 to 6 hours, or   ? 1,000 mg (two 500 mg pills) every 8 hours as needed.  ? Note: Don't take more than 3,000 mg in one day. Acetaminophen is found in many medicines (both prescribed and over-the-counter medicines). Read all labels to be sure you don't take too much.   For children, check the Tylenol bottle for the right dose. The dose is based on the child's age or weight.  3. If you have other health problems (like cancer, heart failure, an organ transplant or severe kidney disease): Call your specialty clinic if you don't feel better in the next 2 days.    4. Know when to call 911. Emergency warning signs include:  ? Trouble breathing or shortness of breath  ? Pain or pressure in the chest that doesn't go away  ? Feeling confused like you haven't felt before, or not being able to wake up  ? Bluish-colored lips or face    5. Your doctor may have  prescribed a blood thinner medicine. Follow their instructions.  Where can I get more information?    [facility] - About COVID-19: [email or web address]    CDC - What to Do If You're Sick: www.cdc.gov/coronavirus/2019-ncov/about/steps-when-sick.html    CDC - Ending Home Isolation: www.cdc.gov/coronavirus/2019-ncov/hcp/disposition-in-home-patients.html    CDC - Caring for Someone: www.cdc.gov/coronavirus/2019-ncov/if-you-are-sick/care-for-someone.html    [facility] - Interim Guidance for Hospital Discharge to Home: www.health.[facility].[place]/diseases/coronavirus/hcp/hospdischarge.pdf    [facility] clinical trials (COVID-19 research studies): clinicalaffairs.[facility]-clinical-trials    Below are the COVID-19 hotlines at the [place] Department of Health ([facility]). Interpreters are available.  ? For health questions: Call 266-196-1924 or 1-413.947.9450 (7 a.m. to 7 p.m.)  ? For questions about schools and childcare: Call 945-811-4865 or 1-954.518.7805 (7 a.m. to 7 p.m.)    For informational purposes only. Not to replace the advice of your health care provider. Clinically reviewed by the Infection Prevention Team. Copyright   2020 [facility] [facility]. All rights reserved. [employer] 442024 - REV 08/04/20.          Return in about 1 week (around 10/16/2020), or if symptoms worsen or fail to improve.    DEON Torres CNP  [facility]      Video-Visit Details    Type of service:  Video Visit    Video End Time:11:58    Originating Location (pt. Location): Home    Distant Location (provider location):  [facility]     Platform used for Video Visit: Leo

## 2021-08-10 ENCOUNTER — VIRTUAL VISIT (OUTPATIENT)
Dept: BEHAVIORAL/MENTAL HEALTH CLINIC | Age: 36
End: 2021-08-10
Payer: COMMERCIAL

## 2021-08-10 DIAGNOSIS — Z79.899 OTHER LONG TERM (CURRENT) DRUG THERAPY: ICD-10-CM

## 2021-08-10 DIAGNOSIS — Z13.6 ENCOUNTER FOR SCREENING FOR CARDIOVASCULAR DISORDERS: ICD-10-CM

## 2021-08-10 DIAGNOSIS — F33.2 SEVERE EPISODE OF RECURRENT MAJOR DEPRESSIVE DISORDER, WITHOUT PSYCHOTIC FEATURES (HCC): Primary | ICD-10-CM

## 2021-08-10 DIAGNOSIS — E55.9 VITAMIN D DEFICIENCY, UNSPECIFIED: ICD-10-CM

## 2021-08-10 PROCEDURE — 99443 PR PHYS/QHP TELEPHONE EVALUATION 21-30 MIN: CPT | Performed by: PSYCHIATRY & NEUROLOGY

## 2021-08-10 RX ORDER — ARIPIPRAZOLE 2 MG/1
2 TABLET ORAL DAILY
Qty: 30 TABLET | Refills: 3 | Status: SHIPPED | OUTPATIENT
Start: 2021-08-10 | End: 2021-11-17

## 2021-08-10 RX ORDER — FLUOXETINE HYDROCHLORIDE 20 MG/1
40 CAPSULE ORAL DAILY
Qty: 60 CAPSULE | Refills: 3 | Status: SHIPPED | OUTPATIENT
Start: 2021-08-10 | End: 2021-11-03

## 2021-08-10 RX ORDER — FLUOXETINE HYDROCHLORIDE 20 MG/1
40 CAPSULE ORAL DAILY
Qty: 30 CAPSULE | Refills: 3 | Status: SHIPPED | OUTPATIENT
Start: 2021-08-10 | End: 2021-08-10

## 2021-08-10 NOTE — PROGRESS NOTES
8/10/2021    TELEPHONE PSYCHIATRY FOLLOWUP -- Audio ONLY TELEPHONE APPT       Psychiatric Diagnoses:  1. Severe episode of recurrent major depressive disorder, without psychotic features ([facility] [address]    2. Vitamin D deficiency, unspecified     3. Other long term (current) drug therapy     4. Encounter for screening for cardiovascular disorders         This virtual visit was conducted via telephone visit with audio only with patient and provider. Patient gave verbal consent for teleservices and will sign a consent form when feasible. This visit lasted 30 minutes of time spent in patient encounter on the phone. Patient Location:       Home    Provider Location ([facility]):        [place], [place]      Due to COVID 19 outbreak, patient's office visit was converted to a telephone visit. Patient was contacted and agreed to proceed with a virtual visit via telephone  30 minutes with direct communication with patient for encounter The risks and benefits of converting to a virtual visit were discussed in light of the current infectious disease epidemic. Patient also understood that insurance coverage and co-pays are up to their individual insurance plans. Patient Location:        Patient's home address  Provider Location ([facility]):        [place] [address] [address] PLAN:      Discussed with patient electroconvulsive therapy (ECT), and after a discussion of the risks and benefits, patient is amenable to pursue an electroconvulsive therapy (ECT) evaluation with Dr. Anita Phillips. At the request of Rocío Wynn, I discussed treatment plan with patient's mom on the phone, at patient's request, while on speaker phone so both mom and patient could hear. Patient's mom is amenable to this plan as well. Denies any acute safety concerns. Patient denies any access to firearms and denies suicidal ideation. Mom has patient's medications, is aware of crisis line to call if she has any concerns acutely.  If approved for electroconvulsive therapy (ECT), mom would transport patient to and from treatment. Patient has suicide hotline number and will call 911 for emergencies or if feeling suicidal.       COUNSELING or THERAPY:   Continue to encourage therapy as part of ongoing treatment of their mental health concerns. Continue to encourage physical activity and adherence to medication regimen. No acute concerns voiced. Medical Diagnoses:  Patient Active Problem List   Diagnosis    Adjustment disorder with mixed anxiety and depressed mood    Major depressive disorder, recurrent episode, moderate ([address]    WELLINGTON (generalized anxiety disorder)    Attention deficit hyperactivity disorder (ADHD), predominantly inattentive type           DATE and changes made   Recommend electroconvulsive therapy (ECT)    Decrease Abilify (aripiprazole) back to 2 mg, seems to be causing weight gain in patient   Continue medication pending electroconvulsive therapy (ECT) evaluation   Baseline labs ordered     AT TODAY'S VISIT     1. Crisis plan reviewed and patient verbally contracts for safety. Go to ED with emergent symptoms or safety concerns. 2. Risks, benefits, side effects of medications, including any / all black box warnings, discussed with patient, who verbalizes their understanding. Pt is lenin for safety and denies thoughts of SI/HI. Amenable to plan. No acute concerns to address regarding medications. Subjective:      Pt reports continued depressive symptoms since last appointment at end of June. Reports he is sleeping greater than 12 hours daily, fatigued. Feelings of hopelessness, low mood. denies suicidal ideation. Anergia, anhedonia are predominant and he has not had a single day since our last appointment where he has experienced improvement in his mood. Is also struggling to find motivation to attend to activities of daily living. Struggling to shower, but has been doing this. Increase in appetite also notable.  Patient reports he would like to pursue electroconvulsive therapy (ECT), after a discussion of his available treatment options. Patient reports they have been compliant with current medication regimen and have not missed a dose. Patient denies medication side effects. At today's visit, patient denies thoughts of harm to self or others since last appointment, and denies auditory or visual hallucinations. At today's visit, pt reports that since our last visit, their average MOOD has been (on a scale of 1-10, with 1 being severely depressed and 10 being not depressed at all)  Very depressed [] 1  [x] 2  [] 3  [] 4  [] 5  [] 6  [] 7  [] 8  [] 9  [] 10  Not depressed    At today's visit, pt reports that since our last visit, their average ANXIETY has been (on a scale of 1-10, with 10 being severely anxious and 1 being not anxious at all)  Not anxious [] 1     [] 2     [x] 3     [] 4   [] 5    [] 6      [] 7   [] 8   [] 9       [] 10  Very anxious       At today's visit, pt reports that since our last visit, their average APPETITE has been    [] Decreased     [] Normal/Unchanged   [x] Increased      At today's visit, pt reports that since our last visit, their average HOURS OF SLEEP (every 24 hours) has been    [] 0-3   [] 4-5    [] 5-6    [] 6-7    [] 8-9    [] 10-11   [x] 12+    At today's visit, pt reports that since our last visit, their average Energy has been     [x] Poor    [] Average  [] Increased         Aggression:  [] yes  [x] no    Patient is [x] Able to contract for safety  [] unable to CONTRACT FOR SAFETY     ROS:  [x] All negative/unchanged except if checked.  Explain positive(checked items) below:     [] Constitutional  [] Eyes  [] Ear/Nose/Mouth/Throat  [] Respiratory  [] CV  [] GI  []   [] Musculoskeletal  [] Skin/Breast  [] Neurological  [] Endocrine  [] Heme/Lymph  [] Allergic/Immunologic      MEDICATIONS:    Current Outpatient Medications:     ARIPiprazole (ABILIFY) 2 MG tablet, Take 1 tablet by mouth daily, Disp: 30 tablet, Rfl: 3    FLUoxetine (PROZAC) 20 MG capsule, Take 2 capsules by mouth daily, Disp: 60 capsule, Rfl: 3    sertraline (ZOLOFT) 100 MG tablet, Take 1 tablet by mouth daily for 7 days, THEN 0.5 tablets daily for 7 days. Then off sertraline (Zoloft) after cross taper complete., Disp: 11 tablet, Rfl: 0    Examination:    Vitals: not taken in person, most recent vitals in chart reviewed  There were no vitals filed for this visit. Wt Readings from Last 3 Encounters:   10/07/20 175 lb (79.4 kg)       Labs:   no recent labs    Mental Status Examination:    (Limited MSE as this is an audio-only telephone encounter)  Speech:  spontaneous, normal rate and normal volume   Mood: \"down\"  Affect:  Blunted, mood congruent  Thought processes:  linear and logical  Thought content:  Denies suicidal or homicidal ideation, denies auditory or visual hallucinations  Cognition:  no deficits in attention, concentration notable, recent memory grossly intact  Concentration intact  Memory intact  Insight good   Judgement fair   Fund of Knowledge adequate    Treatment Plan:  Reviewed current Medications with the patient. Education provided on the compliance with treatment. Reviewed OARRs, no concerns identified     The anticipated benefits and side effects of the medications, including the anticipated results of not receiving the medication, and of alternatives to the medications were explained to the patient and their informed consent was obtained for starting medications as well as adjusting the doses (titration or tapering) as indicated. The above information was given by physician in verbal form and sufficient understanding was in evidence. The patient participated in discussion of the information and question and/or concerns were addressed before the medication was given. PSYCHOTHERAPY/COUNSELING:  Encourage patient to attend outpatient appointments and therapy.     [x] Therapeutic interview  [] Supportive  [x] CBT  [x] Ongoing  [] Other    No follow-ups on file. Follow-up in 2 weeks, patient informed to call for follow-up    Please note this report has been partially produced using speech recognition software  And may cause contain errors related to that system including grammar, punctuation and spelling as well as words and phrases that may seem inappropriate. If there are questions or concerns please feel free to contact me to clarify. Thien Flores MD  Electronically signed by Thien Flores MD on 8/11/2021 at 8:18 PM  8/11/2021 8:18 PM    Psychiatry   Due to this being a TeleHealth encounter, evaluation of the following organ systems is limited: Vitals/Constitutional/EENT/Resp/CV/GI//MS/Neuro/Skin/Heme-Lymph-Imm. An  electronic signature was used to authenticate this note. --Thien Flores MD on 8/11/2021 at 8:18 PM    Pursuant to the emergency declaration under the [facility], [facility] waiver authority and the [employer] and [employer] Act, this Virtual  Visit was conducted, with patient's consent, to reduce the patient's risk of exposure to COVID-19 and provide continuity of care for an established patient Services were provided through a telephone discussion to substitute for in-person clinic visit.

## 2021-08-19 ENCOUNTER — HOSPITAL ENCOUNTER (OUTPATIENT)
Dept: PSYCHIATRY | Age: 36
Setting detail: THERAPIES SERIES
Discharge: HOME | End: 2021-08-19
Payer: COMMERCIAL

## 2021-08-19 PROCEDURE — 99214 OFFICE O/P EST MOD 30 MIN: CPT | Performed by: PSYCHIATRY & NEUROLOGY

## 2021-08-19 NOTE — BH NOTE
[address] [place] [address] Department of Psychiatry  Behavioral Health Consult    REASON FOR CONSULT: Depression, ECT referral    CONSULTING PHYSICIAN: Dr Danette Ryan    History obtained from: patient    HISTORY OF PRESENT ILLNESS:      The patient is a 39 y.o. male with significant past psychiatric history of Depression  Pt sees Dr Nina Mchugh psychiatrist  Pt has been suffering from depression which is treatment resistant. Has tried various medication and is currently on Abilify and prozac. Depression Severity: Rating mood to be around 2/10 (10- good)  Quality:melancholic  Worse in the morning  Content: Hopeless, worthless and helpless feeling  Suicidal thoughts - denies any  Associated symptoms:  Poor concentration, anhedonia, decrease motivation  Sleep- increased and appetite- poor    The patient is currently receiving care for the above psychiatric illness. Psychiatric Review of Systems       Depression: yes     Ashley or Hypomania:  no     Panic Attacks:  no     Phobias:  no     Obsessions and Compulsions:  no     PTSD : no     Hallucinations:  no     Delusions:  no      Substance Abuse History:  ETOH: no  Marijuana: no  Opiates: no  Other Drugs: no      Past Psychiatric History:  Prior Diagnosis: MDD recurrent  Psychiatrist: Dr Danette Ryan  Therapist:no  Hospitalization: no  Hx of Suicidal Attempts: no  Hx of violence:  no  ECT: no    Past Medical History:    No past medical history on file. Past Surgical History:    No past surgical history on file. Medications Prior to Admission:   Not in a hospital admission. Allergies:  Patient has no known allergies. FAMILY/SOCIAL HISTORY:  No family history on file.   Social History     Socioeconomic History    Marital status: Single     Spouse name: Not on file    Number of children: Not on file    Years of education: Not on file    Highest education level: Not on file   Occupational History    Not on file   Tobacco Use    Smoking status: Never Psychiatric:  As per HPI      All other systems negative except as marked or mentioned/indicated in the HPI. [place] PHYSICAL EXAM:  Vitals: There were no vitals taken for this visit. Neuro Exam:   Muscle Strength & Tone: full ROM  Gait: normal gait   Involuntary Movements: No    Mental Status Examination:    Level of consciousness:  within normal limits   Appearance:  ill-appearing  Behavior/Motor:  psychomotor retardation  Attitude toward examiner:  cooperative  Speech:  slow   Mood: constricted, decreased range and depressed  Affect:  blunted  Thought processes:  slow   Thought content:  Suicidal Ideation:  denies suicidal ideation  Delusions:  no evidence of delusions  Perceptual Disturbance:  denies any perceptual disturbance  Cognition:  oriented to person, place, and time   Concentration distractible  Memory intact  Mini Mental Status 30/30  Insight fair   Judgement fair   Fund of Knowledge adequate      DIAGNOSIS:     Major depressive disorder; recurrent, severe and without psychotic features, treatment resistant    LABS: REVIEWED TODAY:  No results for input(s): WBC, HGB, PLT in the last 72 hours. No results for input(s): NA, K, CL, CO2, BUN, CREATININE, GLUCOSE in the last 72 hours. No results for input(s): BILITOT, ALKPHOS, AST, ALT in the last 72 hours. No results found for: Fonnie Eris, LABBENZ, CANNAB, COCAINESCRN, LABMETH, [address], PHENCYCLIDINESCREENURINE, PPXUR, ETOH  No results found for: TSH, FREET4  No results found for: LITHIUM  No results found for: VALPROATE, CBMZ  No results found for: LITHIUM, VALPROATE    FURTHER LABS ORDERED :      Radiology   No results found. RECOMMENDATIONS    Risk Management:  routine:  no special precautions necessary    Medications:  Continue current medication  Pt will benefit from ECT treatment  Discussed with the patient risk, benefit, alternative and common side effects for the  proposed treatment.  Patient is consenting to the treatment.   Pt will need medical clearance  Dianelys to follow up with patient to arrange OP treatment    Electronically signed by Gregory Arevalo MD on 8/19/2021 at 3:27 PM

## 2021-08-31 ENCOUNTER — VIRTUAL VISIT (OUTPATIENT)
Dept: BEHAVIORAL/MENTAL HEALTH CLINIC | Age: 36
End: 2021-08-31
Payer: COMMERCIAL

## 2021-08-31 DIAGNOSIS — F41.1 GAD (GENERALIZED ANXIETY DISORDER): ICD-10-CM

## 2021-08-31 DIAGNOSIS — F33.1 MAJOR DEPRESSIVE DISORDER, RECURRENT EPISODE, MODERATE (HCC): Primary | ICD-10-CM

## 2021-08-31 PROCEDURE — 99443 PR PHYS/QHP TELEPHONE EVALUATION 21-30 MIN: CPT | Performed by: PSYCHIATRY & NEUROLOGY

## 2021-08-31 NOTE — PROGRESS NOTES
8/31/2021    TELEPHONE PSYCHIATRY FOLLOWUP -- Audio ONLY TELEPHONE APPT       Psychiatric Diagnoses:  1. Major depressive disorder, recurrent episode, moderate ([facility] [address]    2. WELL[place] (generalized anxiety disorder)        This virtual visit was conducted via telephone visit with audio only with patient and provider. Patient gave verbal consent for teleservices and will sign a consent form when feasible. This visit lasted 30 minutes of time spent in patient encounter on the phone. Patient Location:       Home    Provider Location ([facility]        [place], [place]      Due to COVID 19 outbreak, patient's office visit was converted to a telephone visit. Patient was contacted and agreed to proceed with a virtual visit via telephone  30 minutes with direct communication with patient for encounter The risks and benefits of converting to a virtual visit were discussed in light of the current infectious disease epidemic. Patient also understood that insurance coverage and co-pays are up to their individual insurance plans. Patient Location:        Patient's home address  Provider Location ([facility]):        [place] [address] [address] PLAN:    ASSESSMENT/PLAN: Medication changes needed given the current presentation of symptoms. Patient was amenable to plan related to medications and endorsed understanding of the risks and benefits, which were explained and discussed. No acute concerns. No thoughts of harm to self or others voiced. MOOD: WORSENED SYMPTOMS OF DEPRESSION: Patient reports worsening of symptoms of depression: low mood , anhedonia (difficulty finding enjoyment in things) , anergia (low energy) , irritability/agitation, change in appetite which is decreased, fatigue, sleeping more, difficulty concentrating, tearful, crying easily, in the context of recent stressor of not completing his examination, feelings of worthlessness, feelings of hopelessness and morbid ideation.      SLEEP: WORSE: Reports sleep is worse, continues to struggle with sleep. Sleeping 10 hours a night. ATTENTION AND FOCUS: No concerns. No recent issues related to difficulty with attention or focus. ANXIETY: CONTINUED SIGNIFICANT CONCERNS: Patient reports continued symptoms of anxiety, which are largely unchanged. Anxiety continues to cause difficulty in interpersonal relationships and with performing duties at work. BIPOLAR SHENA: NO SHENA/HYPOMANIA REPORTED: Patient denies recent symptoms of hsena including racing thoughts, increased goal-directed activity, decreased need for sleep, increased energy, persistently elevated or irritable mood, impulsivity, grandiosity, paranoid thoughts or other signs of shena. SUBSTANCE USE: No concerns related to substance use. Not applicable. COUNSELING or THERAPY:   Continue to encourage therapy as part of ongoing treatment of their mental health concerns. Continue to encourage physical activity and adherence to medication regimen. No acute concerns voiced. Medical Diagnoses:  Patient Active Problem List   Diagnosis    Adjustment disorder with mixed anxiety and depressed mood    Major depressive disorder, recurrent episode, moderate ([address]    WELL[place] (generalized anxiety disorder)    Attention deficit hyperactivity disorder (ADHD), predominantly inattentive type    Severe episode of recurrent major depressive disorder, without psychotic features ([address]           DATE and changes made   9/3/21  o Started on selegiline patches 6 mg/24 hour     AT TODAY'S VISIT     1. No Labs ordered today   2. Crisis plan reviewed and patient verbally contracts for safety. Go to ED with emergent symptoms or safety concerns. 3. Risks, benefits, side effects of medications, including any / all black box warnings, discussed with patient, who verbalizes their understanding. Pt is lenin for safety and denies thoughts of SI/HI. Amenable to plan.  No acute concerns to address Musculoskeletal  [] Skin/Breast  [] Neurological  [] Endocrine  [] Heme/Lymph  [] Allergic/Immunologic      MEDICATIONS:    Current Outpatient Medications:     selegiline (EMSAM) 6 MG/24HR, Place 1 patch onto the skin daily, Disp: 30 patch, Rfl: 3    ARIPiprazole (ABILIFY) 2 MG tablet, Take 1 tablet by mouth daily, Disp: 30 tablet, Rfl: 3    FLUoxetine (PROZAC) 20 MG capsule, Take 2 capsules by mouth daily, Disp: 60 capsule, Rfl: 3    sertraline (ZOLOFT) 100 MG tablet, Take 1 tablet by mouth daily for 7 days, THEN 0.5 tablets daily for 7 days. Then off sertraline (Zoloft) after cross taper complete., Disp: 11 tablet, Rfl: 0    Examination:    Vitals: not taken in person, most recent vitals in chart reviewed  There were no vitals filed for this visit. Wt Readings from Last 3 Encounters:   10/07/20 175 lb (79.4 kg)       Labs:   no recent labs    Mental Status Examination:    (Limited MSE as this is an audio-only telephone encounter)  Speech:  spontaneous, normal rate and normal volume   Mood: \"depressed\"  Affect:  mood congruent  Thought processes:  linear and logical  Thought content:  Denies suicidal or homicidal ideation, denies auditory or visual hallucinations  Cognition:  no deficits in attention, concentration notable, recent memory grossly intact  Concentration intact  Memory intact  Insight good   Judgement fair   Fund of Knowledge adequate    Treatment Plan:  Reviewed current Medications with the patient. Education provided on the compliance with treatment. Reviewed OARRs, no concerns identified     The anticipated benefits and side effects of the medications, including the anticipated results of not receiving the medication, and of alternatives to the medications were explained to the patient and their informed consent was obtained for starting medications as well as adjusting the doses (titration or tapering) as indicated.  The above information was given by physician in verbal form and sufficient understanding was in evidence. The patient participated in discussion of the information and question and/or concerns were addressed before the medication was given. PSYCHOTHERAPY/COUNSELING:  Encourage patient to attend outpatient appointments and therapy. [x] Therapeutic interview  [] Supportive  [x] CBT  [x] Ongoing  [] Other    No follow-ups on file. Follow-up in 2 weeks, patient informed to call for follow-up    Please note this report has been partially produced using speech recognition software  And may cause contain errors related to that system including grammar, punctuation and spelling as well as words and phrases that may seem inappropriate. If there are questions or concerns please feel free to contact me to clarify. Justen Morrow MD  Electronically signed by Justen Morrow MD on 9/3/2021 at 11:42 AM  9/3/2021 11:42 AM    Psychiatry   Due to this being a TeleHealth encounter, evaluation of the following organ systems is limited: Vitals/Constitutional/EENT/Resp/CV/GI//MS/Neuro/Skin/Heme-Lymph-Imm. An  electronic signature was used to authenticate this note. --Justen Morrow MD on 9/3/2021 at 11:42 AM    Pursuant to the emergency declaration under the [facility] waiver authority and the [employer] and [employer] Act, this Virtual  Visit was conducted, with patient's consent, to reduce the patient's risk of exposure to COVID-19 and provide continuity of care for an established patient Services were provided through a telephone discussion to substitute for in-person clinic visit.

## 2021-09-03 NOTE — TELEPHONE ENCOUNTER
Per Dr. Katja Siddiqui, next office visit needs to be in person to get patients blood pressure, and she if he was able to  EMSAM patch, and call Kenny Alberts for [address] [facility] [place] treatment. LM asking patient to return call to the office .

## 2021-09-07 NOTE — TELEPHONE ENCOUNTER
MIKEY full, attempted to let patient know that he needs a in person appointment sooner that 9/22/2021 per .

## 2021-10-25 ENCOUNTER — VIRTUAL VISIT (OUTPATIENT)
Dept: BEHAVIORAL/MENTAL HEALTH CLINIC | Age: 36
End: 2021-10-25
Payer: COMMERCIAL

## 2021-10-25 DIAGNOSIS — Z79.899 OTHER LONG TERM (CURRENT) DRUG THERAPY: ICD-10-CM

## 2021-10-25 DIAGNOSIS — E55.9 VITAMIN D DEFICIENCY, UNSPECIFIED: ICD-10-CM

## 2021-10-25 DIAGNOSIS — F33.2 SEVERE EPISODE OF RECURRENT MAJOR DEPRESSIVE DISORDER, WITHOUT PSYCHOTIC FEATURES (HCC): Primary | ICD-10-CM

## 2021-10-25 PROCEDURE — 99443 PR PHYS/QHP TELEPHONE EVALUATION 21-30 MIN: CPT | Performed by: PSYCHIATRY & NEUROLOGY

## 2021-10-25 NOTE — PROGRESS NOTES
10/25/2021    TELEPHONE PSYCHIATRY FOLLOWUP -- Audio ONLY TELEPHONE APPT       Psychiatric Diagnoses:  1. Severe episode of recurrent major depressive disorder, without psychotic features ([facility] [address]    2. Vitamin D deficiency, unspecified     3. Other long term (current) drug therapy         This virtual visit was conducted via telephone visit with audio only with patient and provider. Patient gave verbal consent for teleservices and will sign a consent form when feasible. This visit lasted 30 minutes of time spent in patient encounter on the phone. Patient Location:       Home    Provider Location (City/State):        [place], [place]      Due to COVID 19 outbreak, patient's office visit was converted to a telephone visit. Patient was contacted and agreed to proceed with a virtual visit via telephone  30 minutes with direct communication with patient for encounter The risks and benefits of converting to a virtual visit were discussed in light of the current infectious disease epidemic. Patient also understood that insurance coverage and co-pays are up to their individual insurance plans. Patient Location:        Patient's home address  Provider Location (City/[facility] [place] [address] PLAN:    ASSESSMENT/PLAN: Will STOP EMSAM as this has been ineffective. Patient reports he was having stomach pain, feeling very low on emsam.     MOOD: WORSENED SYMPTOMS OF DEPRESSION: Patient reports worsening of symptoms of depression: low mood , anhedonia (difficulty finding enjoyment in things) , anergia (low energy) , irritability/agitation, sleeping more and suicidal thoughts without plan. SLEEP: SLEEP DURATION: sleeping 13 hours a night. ATTENTION AND FOCUS: SIGNIFICANT CONCERNS RELATED TO ATTENTION AND FOCUS: Continues to struggle with attention and focus, difficulty with listening when spoken to directly, losing things often, which has been a chronic concern.      ANXIETY: ANXIETY CONTINUES TO BE A CONCERN: Patient reports frequent worrying throughout the day is affecting ability to perform day-to-day activities and/or interpersonal relationships. Very  Perseverative on his test that he has yet to pass. BIPOLAR SHENA: NO SHENA/HYPOMANIA REPORTED: Patient denies recent symptoms of shena including racing thoughts, increased goal-directed activity, decreased need for sleep, increased energy, persistently elevated or irritable mood, impulsivity, grandiosity, paranoid thoughts or other signs of shena. SUBSTANCE USE: No concerns related to substance use. Not applicable. RISK FACTOR ASSESSMENT: Patient endorses the following risk factors which may increase their future risk of suicide attempt: , prior suicide attempt(s) , current depressed mood and not able to see the future for self, passive suicidal ideation     RISK FACTOR ASSESSMENT: Patient endorses the following supportive factors which may help to keep them safe: , age greater than 22 or less than 54, sobriety/no active substance use, good social support in family/friends, medication compliant and plans to follow up with treatment team, future-oriented and denies access to firearms collateral, mom who patient lives with, also discusses concern related to patient's report \"I feel hopeless, and I don't sometimes want to keep living\". Patient reports he would never act on suicidal ideation due to \"I know things can get better, and I want to get better, and I would never do that to my family\". Discussed safety plan with mom and patient who are amenable to calling 911 if suicidal ideation with plan or if worsening suicidal ideation occurs. COUNSELING or THERAPY:   Continue to encourage therapy as part of ongoing treatment of their mental health concerns. Continue to encourage physical activity and adherence to medication regimen. No acute concerns voiced.         Medical Diagnoses:  Patient Active Problem List   Diagnosis    Adjustment disorder with mixed anxiety and depressed mood    Major depressive disorder, recurrent episode, moderate (HCC)    WELLINGTON (generalized anxiety disorder)    Attention deficit hyperactivity disorder (ADHD), predominantly inattentive type    [facility] of [facility]           DATE and changes made   Stop emsam patch, recommend continuing in transcranial magnetic stimulation ([facility]), patient is long term through transcranial magnetic stimulation ([facility]) treatment     AT TODAY'S VISIT     1. No Labs ordered today   2. Crisis plan reviewed and patient verbally contracts for safety. Go to ED with emergent symptoms or safety concerns. 3. Risks, benefits, side effects of medications, including any / all black box warnings, discussed with patient, who verbalizes their understanding. Pt is lenin for safety and denies thoughts of SI/HI. Amenable to plan. No acute concerns to address regarding medications. Subjective:      Pt reports very low mood, anergia         At today's visit, patient denies thoughts of harm to self or others since last appointment, and denies auditory or visual hallucinations.      At today's visit, pt reports that since our last visit, their average MOOD has been (on a scale of 1-10, with 1 being severely depressed and 10 being not depressed at all)  Very depressed [] 1  [x] 2  [] 3  [] 4  [] 5  [] 6  [] 7  [] 8  [] 9  [] 10  Not depressed    At today's visit, pt reports that since our last visit, their average ANXIETY has been (on a scale of 1-10, with 10 being severely anxious and 1 being not anxious at all)  Not anxious [] 1     [] 2     [] 3     [] 4   [] 5    [] 6      [] 7   [x] 8   [] 9       [] 10  Very anxious       At today's visit, pt reports that since our last visit, their average APPETITE has been    [x] Decreased     [] Normal/Unchanged   [] Increased      At today's visit, pt reports that since our last visit, their average HOURS OF SLEEP (every 24 hours) has been    [] 0-3   [] 4-5    [] 5-6    [] 6-7    [] 8-9    [] 10-11   [x] 11+    At today's visit, pt reports that since our last visit, their average Energy has been     [x] Poor    [] Average  [] Increased          Aggression:  [] yes  [x] no    Patient is [x] Able to contract for safety  [] unable to [address]:  [x] All negative/unchanged except if checked. Explain positive(checked items) below:     [] Constitutional  [] Eyes  [] Ear/Nose/Mouth/Throat  [] Respiratory  [] CV  [] GI  []   [] Musculoskeletal  [] Skin/Breast  [] Neurological  [] Endocrine  [] Heme/Lymph  [] Allergic/Immunologic      MEDICATIONS:    Current Outpatient Medications:     liothyronine (CYTOMEL) 5 MCG tablet, Take 1 tablet by mouth daily for 7 days, THEN 2 tablets every morning for 7 days. , Disp: 21 tablet, Rfl: 0    clomiPRAMINE (ANAFRANIL) 25 MG capsule, Take 1 capsule by mouth every morning AND 2 capsules nightly. START THIS MEDICATION ON 11/10/21., Disp: 90 capsule, Rfl: 2    ARIPiprazole (ABILIFY) 2 MG tablet, Take 1 tablet by mouth daily (Patient not taking: Reported on 11/3/2021), Disp: 30 tablet, Rfl: 3    sertraline (ZOLOFT) 100 MG tablet, Take 1 tablet by mouth daily for 7 days, THEN 0.5 tablets daily for 7 days. Then off sertraline (Zoloft) after cross taper complete., Disp: 11 tablet, Rfl: 0    Examination:    Vitals: not taken in person, most recent vitals in chart reviewed  There were no vitals filed for this visit.     Wt Readings from Last 3 Encounters:   11/03/21 201 lb 12.8 oz (91.5 kg)   10/07/20 175 lb (79.4 kg)       Labs:   no recent labs    Mental Status Examination:    (Limited MSE as this is an audio-only telephone encounter)  Speech:  spontaneous, normal rate and normal volume   Mood: \"very low\"  Affect:  mood congruent  Thought processes:  linear and logical  Thought content:  Denies suicidal or homicidal ideation, denies auditory or visual hallucinations  Cognition:  no deficits in attention, concentration notable, recent memory grossly intact  Concentration intact  Memory intact  Insight good   Judgement fair   Fund of Knowledge adequate    Treatment Plan:  Reviewed current Medications with the patient. Education provided on the compliance with treatment. Reviewed OARRs, no concerns identified     The anticipated benefits and side effects of the medications, including the anticipated results of not receiving the medication, and of alternatives to the medications were explained to the patient and their informed consent was obtained for starting medications as well as adjusting the doses (titration or tapering) as indicated. The above information was given by physician in verbal form and sufficient understanding was in evidence. The patient participated in discussion of the information and question and/or concerns were addressed before the medication was given. PSYCHOTHERAPY/COUNSELING:  Encourage patient to attend outpatient appointments and therapy. [x] Therapeutic interview  [] Supportive  [x] CBT  [x] Ongoing  [] Other    No follow-ups on file. Follow-up in 2 weeks, patient informed to call for follow-up    Please note this report has been partially produced using speech recognition software  And may cause contain errors related to that system including grammar, punctuation and spelling as well as words and phrases that may seem inappropriate. If there are questions or concerns please feel free to contact me to clarify. Constantine Leon MD  Electronically signed by Constantine Leon MD on 11/11/2021 at 4:13 PM  11/11/2021 4:13 PM    Psychiatry   Due to this being a TeleHealth encounter, evaluation of the following organ systems is limited: Vitals/Constitutional/EENT/Resp/CV/GI//MS/Neuro/Skin/Heme-Lymph-Imm. An  electronic signature was used to authenticate this note.   --Constantine Leon MD on 11/11/2021 at 4:13 PM    Pursuant to the emergency declaration under the [facility], [facility] waiver authority and the [employer] Act, this Virtual  Visit was conducted, with patient's consent, to reduce the patient's risk of exposure to COVID-19 and provide continuity of care for an established patient Services were provided through a telephone discussion to substitute for in-person clinic visit.

## 2021-11-03 ENCOUNTER — OFFICE VISIT (OUTPATIENT)
Dept: BEHAVIORAL/MENTAL HEALTH CLINIC | Age: 36
End: 2021-11-03
Payer: COMMERCIAL

## 2021-11-03 VITALS
HEIGHT: 71 IN | WEIGHT: 201.8 LBS | SYSTOLIC BLOOD PRESSURE: 107 MMHG | BODY MASS INDEX: 28.25 KG/M2 | DIASTOLIC BLOOD PRESSURE: 65 MMHG | HEART RATE: 91 BPM

## 2021-11-03 DIAGNOSIS — F33.2 SEVERE EPISODE OF RECURRENT MAJOR DEPRESSIVE DISORDER, WITHOUT PSYCHOTIC FEATURES (HCC): ICD-10-CM

## 2021-11-03 DIAGNOSIS — Z13.6 ENCOUNTER FOR SCREENING FOR CARDIOVASCULAR DISORDERS: ICD-10-CM

## 2021-11-03 DIAGNOSIS — Z79.899 OTHER LONG TERM (CURRENT) DRUG THERAPY: ICD-10-CM

## 2021-11-03 DIAGNOSIS — F33.2 MDD (MAJOR DEPRESSIVE DISORDER), RECURRENT SEVERE, WITHOUT PSYCHOSIS (HCC): Primary | ICD-10-CM

## 2021-11-03 DIAGNOSIS — E55.9 VITAMIN D DEFICIENCY, UNSPECIFIED: ICD-10-CM

## 2021-11-03 DIAGNOSIS — F41.1 GAD (GENERALIZED ANXIETY DISORDER): ICD-10-CM

## 2021-11-03 LAB
ALBUMIN SERPL-MCNC: 4.6 G/DL (ref 3.5–4.6)
ALP BLD-CCNC: 138 U/L (ref 35–104)
ALT SERPL-CCNC: 20 U/L (ref 0–41)
ANION GAP SERPL CALCULATED.3IONS-SCNC: 11 MEQ/L (ref 9–15)
AST SERPL-CCNC: 21 U/L (ref 0–40)
BASOPHILS ABSOLUTE: 0 K/UL (ref 0–0.2)
BASOPHILS RELATIVE PERCENT: 0.5 %
BILIRUB SERPL-MCNC: 0.9 MG/DL (ref 0.2–0.7)
BILIRUBIN DIRECT: <0.2 MG/DL (ref 0–0.4)
BILIRUBIN, INDIRECT: ABNORMAL MG/DL (ref 0–0.6)
BUN BLDV-MCNC: 9 MG/DL (ref 6–20)
CALCIUM SERPL-MCNC: 9.8 MG/DL (ref 8.5–9.9)
CHLORIDE BLD-SCNC: 100 MEQ/L (ref 95–107)
CHOLESTEROL, TOTAL: 199 MG/DL (ref 0–199)
CO2: 27 MEQ/L (ref 20–31)
CREAT SERPL-MCNC: 1.14 MG/DL (ref 0.7–1.2)
EOSINOPHILS ABSOLUTE: 0.3 K/UL (ref 0–0.7)
EOSINOPHILS RELATIVE PERCENT: 3.8 %
FOLATE: 7.2 NG/ML (ref 7.3–26.1)
GFR AFRICAN AMERICAN: >60
GFR NON-AFRICAN AMERICAN: >60
GLOBULIN: 3 G/DL (ref 2.3–3.5)
GLUCOSE BLD-MCNC: 86 MG/DL (ref 70–99)
HBA1C MFR BLD: 5.4 % (ref 4.8–5.9)
HCT VFR BLD CALC: 49.6 % (ref 42–52)
HDLC SERPL-MCNC: 45 MG/DL (ref 40–59)
HEMOGLOBIN: 16.9 G/DL (ref 14–18)
LDL CHOLESTEROL CALCULATED: 141 MG/DL (ref 0–129)
LYMPHOCYTES ABSOLUTE: 1.8 K/UL (ref 1–4.8)
LYMPHOCYTES RELATIVE PERCENT: 25.1 %
MCH RBC QN AUTO: 30.6 PG (ref 27–31.3)
MCHC RBC AUTO-ENTMCNC: 34.1 % (ref 33–37)
MCV RBC AUTO: 89.8 FL (ref 80–100)
MONOCYTES ABSOLUTE: 0.5 K/UL (ref 0.2–0.8)
MONOCYTES RELATIVE PERCENT: 7.3 %
NEUTROPHILS ABSOLUTE: 4.7 K/UL (ref 1.4–6.5)
NEUTROPHILS RELATIVE PERCENT: 63.3 %
PDW BLD-RTO: 13 % (ref 11.5–14.5)
PLATELET # BLD: 323 K/UL (ref 130–400)
PLATELET SLIDE REVIEW: NORMAL
POIKILOCYTES: NORMAL
POTASSIUM SERPL-SCNC: 4.1 MEQ/L (ref 3.4–4.9)
RBC # BLD: 5.53 M/UL (ref 4.7–6.1)
SLIDE REVIEW: NORMAL
SODIUM BLD-SCNC: 138 MEQ/L (ref 135–144)
T3 FREE: 2.8 PG/ML (ref 2–4.4)
T4 FREE: 1.35 NG/DL (ref 0.84–1.68)
TEAR DROP CELLS: NORMAL
TOTAL PROTEIN: 7.6 G/DL (ref 6.3–8)
TRIGL SERPL-MCNC: 64 MG/DL (ref 0–150)
TSH REFLEX: 1.75 UIU/ML (ref 0.44–3.86)
VITAMIN B-12: 453 PG/ML (ref 232–1245)
VITAMIN D 25-HYDROXY: 34.6 NG/ML (ref 30–100)
WBC # BLD: 7.4 K/UL (ref 4.8–10.8)

## 2021-11-03 PROCEDURE — 99215 OFFICE O/P EST HI 40 MIN: CPT | Performed by: PSYCHIATRY & NEUROLOGY

## 2021-11-03 PROCEDURE — G8427 DOCREV CUR MEDS BY ELIG CLIN: HCPCS | Performed by: PSYCHIATRY & NEUROLOGY

## 2021-11-03 RX ORDER — CLOMIPRAMINE HYDROCHLORIDE 25 MG/1
CAPSULE ORAL
Qty: 90 CAPSULE | Refills: 2 | Status: SHIPPED | OUTPATIENT
Start: 2021-11-03 | End: 2021-11-17 | Stop reason: SDUPTHER

## 2021-11-03 RX ORDER — LIOTHYRONINE SODIUM 5 UG/1
TABLET ORAL
Qty: 21 TABLET | Refills: 0 | Status: SHIPPED | OUTPATIENT
Start: 2021-11-03 | End: 2021-11-17

## 2021-11-03 NOTE — PROGRESS NOTES
11/3/2021    45 mins total for this encounter =     37 minutes with direct communication with patient for encounter     8 mins (in addition) spent on chart review, and in the ordering of all necessary labs and/or medications      The risks and benefits of converting to a virtual visit were discussed in light of the current infectious disease epidemic. Patient also understood that insurance coverage and co-pays are up to their individual insurance plans. Patient Location:        Patient's home address  Provider Location ([facility]/State):        Ron [address] [place] [address] (OUTPATIENT)   Audio/Visual (During [facility] public health emergency)          Psychiatric Diagnoses:  1. MDD (major depressive disorder), recurrent severe, without psychosis ([facility] [address]    2. 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 He does need a washout period from the MAOI. Would be able to start clomipramine in 1 week from today according to guidelines that would suggest that since he stopped the patch 1 week ago, MAOI should be at relatively low levels in his system by 15 days later. He is aware of the plan. Discussed with him risk factors for suicide. Patient denies suicidal ideation or plan. Mom is also present for appointment. Mom provided the additional history that states that she is just concerned about how much Nitish Noland is isolating to his bed. Elmira Song does say that his mood has been exceptionally low recently. Feels hopeless. Most of his thoughts are perseverative on this exam.  We did discuss ECT which he is resistant to.   Will start Cytomel along with the TCA antidepressant to augment therapy. Cytomel can be started today. Patient denies thoughts of harm to self or others. No acute safety concerns voiced at this appointment. COUNSELING or THERAPY:   Continue to encourage therapy as part of ongoing treatment of their mental health concerns. Continue to encourage physical activity and adherence to medication regimen. DATE and changes made   LABS    START liothyronine (Cytomel) TOMORROW PENDING LAB RESULS    Continue transcranial magnetic stimulation (TMS), patient is nearly to week 4, not noticing much improvement yet    IN ONE WEEK, will START clomipramine (Anafranil) for OCD   o Washout needed from the MAOI selegiline patch he stopped a week ago    electroconvulsive therapy (ECT) is next step       Medical Diagnoses:  Patient Active Problem List   Diagnosis    Adjustment disorder with mixed anxiety and depressed mood    Major depressive disorder, recurrent episode, moderate (HCC)    WELLINGTON (generalized anxiety disorder)    Attention deficit hyperactivity disorder (ADHD), predominantly inattentive type    Severe episode of recurrent major depressive disorder, without psychotic features ([facility] [address]         AT TODAY'S VISIT     Crisis plan reviewed and patient verbally contracts for safety. Go to ED with emergent symptoms or safety concerns. Risks, benefits, side effects of medications, including any / all black box warnings, discussed with patient, who verbalizes their understanding. Pt is lenin for safety and denies thoughts of SI/HI. Amenable to plan. No acute concerns to address regarding medications. Subjective:      Patient denies medication side effects apart from those mentioned in the assessment and plan. Patient reports they have been compliant with current medication regimen and have not missed a dose. At today's visit, patient denies thoughts of harm to self or others since last appointment, and denies auditory or visual hallucinations. ROS:  [x] All negative/unchanged except if checked.  Explain positive(checked items) below:       Denies any new or changed physical symptoms other than those noted in the subjective portion of this note   [] Constitutional  [] Eyes  [] Ear/Nose/Mouth/Throat  [] Respiratory  [] CV  [] GI  []   [] Musculoskeletal  [] Skin/Breast  [] Neurological  [] Endocrine  [] Heme/Lymph  [] Allergic/Immunologic    OBJECTIVE:   Recent Results (from the past 1008 hour(s))   Hepatic Function Panel    Collection Time: 11/03/21  3:10 PM   Result Value Ref Range    Total Protein 7.6 6.3 - 8.0 g/dL    Albumin 4.6 3.5 - 4.6 g/dL    Alkaline Phosphatase 138 (H) 35 - 104 U/L    ALT 20 0 - 41 U/L    AST 21 0 - 40 U/L    Total Bilirubin 0.9 (H) 0.2 - 0.7 mg/dL    Bilirubin, Direct <0.2 0.0 - 0.4 mg/dL    Bilirubin, Indirect see below 0.0 - 0.6 mg/dL   Comprehensive Metabolic Panel    Collection Time: 11/03/21  3:10 PM   Result Value Ref Range    Sodium 138 135 - 144 mEq/L    Potassium 4.1 3.4 - 4.9 mEq/L    Chloride 100 95 - 107 mEq/L    CO2 27 20 - 31 mEq/L    Anion Gap 11 9 - 15 mEq/L    Glucose 86 70 - 99 mg/dL    BUN 9 6 - 20 mg/dL    CREATININE 1.14 0.70 - 1.20 mg/dL    GFR Non-African American >60.0 >60    GFR  >60.0 >60    Calcium 9.8 8.5 - 9.9 mg/dL    Globulin 3.0 2.3 - 3.5 g/dL   Hemoglobin A1C    Collection Time: 11/03/21  3:10 PM   Result Value Ref Range    Hemoglobin A1C 5.4 4.8 - 5.9 %   CBC Auto Differential    Collection Time: 11/03/21  3:10 PM   Result Value Ref Range    WBC 7.4 4.8 - 10.8 K/uL    RBC 5.53 4.70 - 6.10 M/uL    Hemoglobin 16.9 14.0 - 18.0 g/dL    Hematocrit 49.6 42.0 - 52.0 %    MCV 89.8 80.0 - 100.0 fL    MCH 30.6 27.0 - 31.3 pg    MCHC 34.1 33.0 - 37.0 %    RDW 13.0 11.5 - 14.5 %    Platelets 832 238 - 995 K/uL    PLATELET SLIDE REVIEW Normal     SLIDE REVIEW see below     Neutrophils % 63.3 %    Lymphocytes % 25.1 %    Monocytes % 7.3 %    Eosinophils % 3.8 %    Basophils % 0.5 % Neutrophils Absolute 4.7 1.4 - 6.5 K/uL    Lymphocytes Absolute 1.8 1.0 - 4.8 K/uL    Monocytes Absolute 0.5 0.2 - 0.8 K/uL    Eosinophils Absolute 0.3 0.0 - 0.7 K/uL    Basophils Absolute 0.0 0.0 - 0.2 K/uL    Poikilocytes 1+     Tear Drop Cells 1+    Vitamin B12 & Folate    Collection Time: 11/03/21  3:10 PM   Result Value Ref Range    Vitamin B-12 453 232 - 1245 pg/mL    Folate 7.2 (L) 7.3 - 26.1 ng/mL   Vitamin D 25 Hydroxy    Collection Time: 11/03/21  3:10 PM   Result Value Ref Range    Vit D, 25-Hydroxy 34.6 30.0 - 100.0 ng/mL   TSH with Reflex    Collection Time: 11/03/21  3:10 PM   Result Value Ref Range    TSH 1.750 0.440 - 3.860 uIU/mL   T3, Free    Collection Time: 11/03/21  3:10 PM   Result Value Ref Range    T3, Free 2.8 2.0 - 4.4 pg/mL   T4, Free    Collection Time: 11/03/21  3:10 PM   Result Value Ref Range    T4 Free 1.35 0.84 - 1.68 ng/dL   Testosterone Free And Total Male    Collection Time: 11/03/21  3:10 PM   Result Value Ref Range    Testosterone Free, Calc 80 47 - 244 pg/mL    Testosterone % Free 1.4 (L) 1.6 - 2.9 %    Total Testosterone 554 300 - 1080 ng/dL    Sex Hormone Binding 53 11 - 80 nmol/L   Lipid Panel    Collection Time: 11/03/21  3:10 PM   Result Value Ref Range    Cholesterol, Total 199 0 - 199 mg/dL    Triglycerides 64 0 - 150 mg/dL    HDL 45 40 - 59 mg/dL    LDL Calculated 141 (H) 0 - 129 mg/dL       MEDICATIONS:    Current Outpatient Medications:     liothyronine (CYTOMEL) 25 MCG tablet, Take 1 tablet by mouth daily, Disp: 30 tablet, Rfl: 3    clomiPRAMINE (ANAFRANIL) 25 MG capsule, Take 1 capsule by mouth every morning AND 2 capsules nightly., Disp: 90 capsule, Rfl: 2    Examination:    Vitals: not taken in person, most recent vitals in chart reviewed  Vitals:    11/03/21 1340   BP: 107/65   Pulse: 91       Wt Readings from Last 3 Encounters:   11/17/21 198 lb 6.4 oz (90 kg)   11/03/21 201 lb 12.8 oz (91.5 kg)   10/07/20 175 lb (79.4 kg)       BP Readings from Last 3 Encounters:   11/17/21 130/74   11/03/21 107/65   10/07/20 112/68         Mental Status Examination:    Level of consciousness:  alert and oriented to person, place, and situation  Appearance:  Mildly disheveled, neglected grooming  Behavior/Motor:  no abnormalities noted  Attitude toward examiner: cooperative, minimal eye contact  Speech:  Minimally conversive, low volume, sad  Mood: \"depressed\" appears very depressed, sad, tearful at times   Affect:  blunted  Thought processes:  linear and logical  Thought content:  Endorses passive death wish denies plan Denies suicidal or homicidal ideation, denies auditory or visual hallucinations  Cognition:  no deficits in attention, concentration notable, recent memory grossly intact  Concentration intact  Memory intact  Insight good   Judgement fair   Fund of Knowledge adequate    PSYCHOTHERAPY/COUNSELING:  Encourage patient to attend outpatient appointments and therapy. [x] Therapeutic interview  [] Supportive  [x] CBT  [x] Ongoing  [] Other    No follow-ups on file. patient informed to call for follow-up or to reschedule appointment     Please note this report has been partially produced using speech recognition software  And may cause contain errors related to that system including grammar, punctuation and spelling as well as words and phrases that may seem inappropriate. If there are questions or concerns please feel free to contact me to clarify. Rubi Man MD  Electronically signed by Rubi Man MD on 11/19/2021 at 1:53 PM  11/19/2021 1:53 PM    Psychiatry   Due to this being a TeleHealth encounter, evaluation of the following organ systems is limited: Vitals/Constitutional/EENT/Resp/CV/GI//MS/Neuro/Skin/Heme-Lymph-Imm. An  electronic signature was used to authenticate this note.   --Rubi Man MD on 11/19/2021 at 1:53 PM    Pursuant to the emergency declaration under the [facility] [address] waiver

## 2021-11-08 LAB
SEX HORMONE BINDING GLOBULIN: 53 NMOL/L (ref 11–80)
TESTOSTERONE FREE PERCENT: 1.4 % (ref 1.6–2.9)
TESTOSTERONE FREE, CALC: 80 PG/ML (ref 47–244)
TESTOSTERONE TOTAL-MALE: 554 NG/DL (ref 300–1080)

## 2021-11-17 ENCOUNTER — OFFICE VISIT (OUTPATIENT)
Dept: BEHAVIORAL/MENTAL HEALTH CLINIC | Age: 36
End: 2021-11-17
Payer: COMMERCIAL

## 2021-11-17 VITALS
HEIGHT: 70 IN | DIASTOLIC BLOOD PRESSURE: 74 MMHG | HEART RATE: 92 BPM | BODY MASS INDEX: 28.4 KG/M2 | SYSTOLIC BLOOD PRESSURE: 130 MMHG | WEIGHT: 198.4 LBS

## 2021-11-17 DIAGNOSIS — F41.1 GAD (GENERALIZED ANXIETY DISORDER): Primary | ICD-10-CM

## 2021-11-17 DIAGNOSIS — F33.1 MAJOR DEPRESSIVE DISORDER, RECURRENT EPISODE, MODERATE (HCC): ICD-10-CM

## 2021-11-17 PROCEDURE — 99215 OFFICE O/P EST HI 40 MIN: CPT | Performed by: PSYCHIATRY & NEUROLOGY

## 2021-11-17 PROCEDURE — 1036F TOBACCO NON-USER: CPT | Performed by: PSYCHIATRY & NEUROLOGY

## 2021-11-17 PROCEDURE — G8428 CUR MEDS NOT DOCUMENT: HCPCS | Performed by: PSYCHIATRY & NEUROLOGY

## 2021-11-17 PROCEDURE — G8484 FLU IMMUNIZE NO ADMIN: HCPCS | Performed by: PSYCHIATRY & NEUROLOGY

## 2021-11-17 PROCEDURE — G8419 CALC BMI OUT NRM PARAM NOF/U: HCPCS | Performed by: PSYCHIATRY & NEUROLOGY

## 2021-11-17 RX ORDER — CLOMIPRAMINE HYDROCHLORIDE 25 MG/1
CAPSULE ORAL
Qty: 90 CAPSULE | Refills: 2 | Status: SHIPPED | OUTPATIENT
Start: 2021-11-17 | End: 2021-11-29

## 2021-11-17 RX ORDER — LIOTHYRONINE SODIUM 25 UG/1
25 TABLET ORAL DAILY
Qty: 30 TABLET | Refills: 3 | Status: SHIPPED | OUTPATIENT
Start: 2021-11-17 | End: 2022-04-05

## 2021-11-17 NOTE — PROGRESS NOTES
11/17/2021    IN PERSON Appointment PSYCHIATRY FOLLOWUP       Psychiatric Diagnoses:  1. WELLINGTON (generalized anxiety disorder)    2. Major depressive disorder, recurrent episode, moderate (HCC)            41 mins total for this encounter = time in minutes with direct communication with patient face to face for appointment at [facility] OF THE [facility] point office location     Patient and Provider location: [address] [place] [address]     Assessment/Plan:   Has not yet started clomipramine (Anafranil)   Discussed need to start this   Liothyronine (Cytomel) hashelped to increase energy   Encouraged again to start therapy   Has not been attending transcranial magnetic stimulation ([facility]), reinforced importance of continuing this and patient says he will return to transcranial magnetic stimulation ([address]) and plans to start the clomipramine (Anafranil)   Patient says he does not really want to talk his brother's friend. Patient denies thoughts of harm to self or others. No acute safety concerns voiced at this appointment. MOOD: CONTINUED LOW MOOD: Patient continues to experience symptoms of low mood, low energy and low motivation, as well as anhedonia, but still motivated to complete necessary work and attend to activities of daily living. SLEEP: CONTINUED SIGNIFICANT CONCERNS: Continues to struggle with sleep, which has been a chronic concern. Sleeping 12 hours a night. Patient reports they have been isolating, sedentary. ATTENTION AND FOCUS: No concerns. No recent issues related to difficulty with attention or focus. ANXIETY: CONTINUED SIGNIFICANT CONCERNS: Patient reports continued symptoms of anxiety, which are largely unchanged. Anxiety continues to cause difficulty in interpersonal relationships and with performing duties at work. 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      BIPOLAR SHENA: NO SHENA/HYPOMANIA REPORTED: Patient denies recent symptoms of shena including racing thoughts, increased goal-directed activity, decreased need for sleep, increased energy, persistently elevated or irritable mood, impulsivity, grandiosity, paranoid thoughts or other signs of lilian. SUBSTANCE USE: No concerns related to substance use. Not applicable. and No concerns for ongoing substance use. Patient denies any recent substance use    ASSESSMENT/PLAN: Medication changes needed given the current presentation of symptoms. Patient was amenable to plan related to medications and endorsed understanding of the risks and benefits, which were explained and discussed. No acute concerns. No thoughts of harm to self or others voiced. COUNSELING or THERAPY:   Continue to encourage therapy as part of ongoing treatment of their mental health concerns. Continue to encourage physical activity and adherence to medication regimen. DATE and changes made  HPI          Medical Diagnoses:  Patient Active Problem List   Diagnosis    Adjustment disorder with mixed anxiety and depressed mood    Major depressive disorder, recurrent episode, moderate ([address]    WELLINGTON (generalized anxiety disorder)    Attention deficit hyperactivity disorder (ADHD), predominantly inattentive type    Severe episode of recurrent major depressive disorder, without psychotic features ([address]         AT TODAY'S VISIT     1. No Labs ordered today  2. Crisis plan reviewed and patient verbally contracts for safety. Go to ED with emergent symptoms or safety concerns. 3. Risks, benefits, side effects of medications, including any / all black box warnings, discussed with patient, who verbalizes their understanding. Pt is lenin for safety and denies thoughts of SI/HI. Amenable to plan. No acute concerns to address regarding medications. Subjective:      Patient denies medication side effects apart from those mentioned in the assessment and plan.    Patient reports they have been compliant with current medication regimen and have not missed a dose. Aggression:  [] yes  [x] no    Patient is [x] Able to contract for safety  [] unable to CONTRACT FOR SAFETY     ROS:  [x] All negative/unchanged except if checked.  Explain positive(checked items) below:     Denies any new or changed physical symptoms other than those noted in the subjective portion of this note   [] Constitutional  [] Eyes  [] Ear/Nose/Mouth/Throat  [] Respiratory  [] CV  [] GI  []   [] Musculoskeletal  [] Skin/Breast  [] Neurological  [] Endocrine  [] Heme/Lymph  [] Allergic/Immunologic    MEDICATIONS:    Current Outpatient Medications:     liothyronine (CYTOMEL) 25 MCG tablet, Take 1 tablet by mouth daily (Patient not taking: Reported on 11/29/2021), Disp: 30 tablet, Rfl: 3    fluvoxaMINE (LUVOX) 25 MG tablet, Take 1 tablet by mouth nightly, Disp: 30 tablet, Rfl: 3    Examination:    Vitals: not taken in person, most recent vitals in chart reviewed  Vitals:    11/17/21 1331   BP: 130/74   Pulse: 92       Wt Readings from Last 3 Encounters:   11/29/21 197 lb 12.8 oz (89.7 kg)   11/17/21 198 lb 6.4 oz (90 kg)   11/03/21 201 lb 12.8 oz (91.5 kg)       BP Readings from Last 3 Encounters:   11/29/21 122/75   11/17/21 130/74   11/03/21 107/65           Labs:   no recent labs    Mental Status Examination:    Level of consciousness:  alert and oriented to person, place, and situation  Appearance: Appears tired, wearing sweatpants and sweatshirt, unshaven  Behavior/Motor:  no abnormalities noted  Attitude toward examiner: friendly, pleasant and cooperative, attentive and good eye contact  Speech:  spontaneous, normal rate and normal volume   Mood: \"depressed, very sad\"  Affect:  mood congruent  Thought processes:  linear and logical  Thought content:  Denies suicidal or homicidal ideation, denies auditory or visual hallucinations  Cognition:  no deficits in attention, concentration notable, recent memory grossly intact  Concentration intact  Memory intact  Insight good Judgement fair   Fund of Knowledge adequate    Treatment Plan:  Reviewed current Medications with the patient. Education provided on the compliance with treatment. Reviewed OARRs, no concerns identified     The anticipated benefits and side effects of the medications, including the anticipated results of not receiving the medication, and of alternatives to the medications were explained to the patient and their informed consent was obtained for starting medications as well as adjusting the doses (titration or tapering) as indicated. The above information was given by physician in verbal form and sufficient understanding was in evidence. The patient participated in discussion of the information and question and/or concerns were addressed before the medication was given. PSYCHOTHERAPY/COUNSELING:  Encourage patient to attend outpatient appointments and therapy. [x] Therapeutic interview  [] Supportive  [x] CBT  [x] Ongoing  [] Other    No follow-ups on file. patient informed to call for follow-up or to reschedule appointment     Please note this report has been partially produced using speech recognition software  And may cause contain errors related to that system including grammar, punctuation and spelling as well as words and phrases that may seem inappropriate. If there are questions or concerns please feel free to contact me to clarify. Constantine Leon MD  Electronically signed by Constantine Leon MD on 12/7/2021 at 3:24 PM  12/7/2021 3:24 PM    Psychiatry     An  electronic signature was used to authenticate this note.   --Constantine Leon MD on 12/7/2021 at 3:24 PM

## 2021-11-24 ENCOUNTER — TELEPHONE (OUTPATIENT)
Dept: BEHAVIORAL/MENTAL HEALTH CLINIC | Age: 36
End: 2021-11-24

## 2021-11-29 ENCOUNTER — OFFICE VISIT (OUTPATIENT)
Dept: BEHAVIORAL/MENTAL HEALTH CLINIC | Age: 36
End: 2021-11-29
Payer: COMMERCIAL

## 2021-11-29 VITALS
DIASTOLIC BLOOD PRESSURE: 75 MMHG | HEART RATE: 88 BPM | WEIGHT: 197.8 LBS | SYSTOLIC BLOOD PRESSURE: 122 MMHG | BODY MASS INDEX: 28.32 KG/M2 | HEIGHT: 70 IN

## 2021-11-29 DIAGNOSIS — F42.8 OTHER OBSESSIVE-COMPULSIVE DISORDERS: ICD-10-CM

## 2021-11-29 DIAGNOSIS — F33.2 SEVERE EPISODE OF RECURRENT MAJOR DEPRESSIVE DISORDER, WITHOUT PSYCHOTIC FEATURES (HCC): Primary | ICD-10-CM

## 2021-11-29 PROCEDURE — 1036F TOBACCO NON-USER: CPT | Performed by: PSYCHIATRY & NEUROLOGY

## 2021-11-29 PROCEDURE — G8484 FLU IMMUNIZE NO ADMIN: HCPCS | Performed by: PSYCHIATRY & NEUROLOGY

## 2021-11-29 PROCEDURE — 99215 OFFICE O/P EST HI 40 MIN: CPT | Performed by: PSYCHIATRY & NEUROLOGY

## 2021-11-29 PROCEDURE — G8419 CALC BMI OUT NRM PARAM NOF/U: HCPCS | Performed by: PSYCHIATRY & NEUROLOGY

## 2021-11-29 PROCEDURE — G8427 DOCREV CUR MEDS BY ELIG CLIN: HCPCS | Performed by: PSYCHIATRY & NEUROLOGY

## 2021-11-29 RX ORDER — FLUVOXAMINE MALEATE 25 MG
25 TABLET ORAL NIGHTLY
Qty: 30 TABLET | Refills: 3 | Status: SHIPPED | OUTPATIENT
Start: 2021-11-29 | End: 2021-12-08

## 2021-11-29 NOTE — PROGRESS NOTES
11/29/2021    IN PERSON Appointment PSYCHIATRY FOLLOWUP       Psychiatric Diagnoses:  1. Severe episode of recurrent major depressive disorder, without psychotic features ([address]    2. Other obsessive-compulsive disorders            41 mins total for this encounter = time in minutes with direct communication with patient face to face for appointment at [facility] OF THE [facility] point office location     Patient and Provider location: [address] [place] [address]     Assessment/Plan:   Stop clomipramine and stop Cytomel. Patient had not been taking these the last several days due to reportedly having palpitations and also felt that Anafranil made anxiety worse. Start Luvox 25 mg at night with plan to taper up as tolerated. Patient continues to endorse significant obsessive intrusive thoughts related to not being able to pass his exam.  Major depressive disorder severe is diagnosed. Has had intermittent palpitations. Was experiencing this when laying down for bed. Wt Readings from Last 3 Encounters:   11/29/21 197 lb 12.8 oz (89.7 kg)   11/17/21 198 lb 6.4 oz (90 kg)   11/03/21 201 lb 12.8 oz (91.5 kg)     Temp Readings from Last 3 Encounters:   10/07/20 98.5 °F (36.9 °C) (Temporal)     BP Readings from Last 3 Encounters:   11/29/21 122/75   11/17/21 130/74   11/03/21 107/65     Pulse Readings from Last 3 Encounters:   11/29/21 88   11/17/21 92   11/03/21 91         Patient denies thoughts of harm to self or others. No acute safety concerns voiced at this appointment. MOOD: LOW MOOD: Over the last several weeks, patient has been experiencing symptoms of low mood, low energy and low motivation, as well as anhedonia, but still motivated to complete necessary work and attend to activities of daily living. SLEEP: IMPROVED: Sleeping better, continues to struggle with sleep somewhat. Sleeping 6 hours a night. Discussed with patient the importance of regular physical exercise as part of mind/body wellness.  Discussed that evidence supports the use of routine exercise for treating symptoms of anxiety and depression. ATTENTION AND FOCUS: WELL-CONTROLLED (on medication): Patient reports improvement in attention and focus. Medication has helped with memory, impulse-control, concentration, focus, and attentiveness. Has not had side effects from this (denies tachycardia, palpitations, or changes in appetite or sleep). ANXIETY: ANXIETY CONTINUES TO BE A CONCERN: Patient reports frequent worrying throughout the day is affecting ability to perform day-to-day activities and/or interpersonal relationships. BIPOLAR SHENA: NO SHENA/HYPOMANIA REPORTED: Patient denies recent symptoms of shena including racing thoughts, increased goal-directed activity, decreased need for sleep, increased energy, persistently elevated or irritable mood, impulsivity, grandiosity, paranoid thoughts or other signs of shena. SUBSTANCE USE: No concerns related to substance use. Not applicable. and No concerns for ongoing substance use. Patient denies any recent substance use    ASSESSMENT/PLAN: Medication changes needed given the current presentation of symptoms. Patient was amenable to plan related to medications and endorsed understanding of the risks and benefits, which were explained and discussed. No acute concerns. No thoughts of harm to self or others voiced. COUNSELING or THERAPY:   Continue to encourage therapy as part of ongoing treatment of their mental health concerns. Continue to encourage physical activity and adherence to medication regimen.      DATE and changes made  12/7/21  -fluvoxamine (Luvox) 25 mg daily   Stop clomipramine (Anafranil) and liothyronine (Cytomel)             Medical Diagnoses:  Patient Active Problem List   Diagnosis    Adjustment disorder with mixed anxiety and depressed mood    Major depressive disorder, recurrent episode, moderate (HCC)    [place]LL[place] (generalized anxiety disorder)    Attention deficit hyperactivity disorder (ADHD), predominantly inattentive type    Severe episode of recurrent major depressive disorder, without psychotic features [address]         AT TODAY'S VISIT     1. No Labs ordered today  2. Crisis plan reviewed and patient verbally contracts for safety. Go to ED with emergent symptoms or safety concerns. 3. Risks, benefits, side effects of medications, including any / all black box warnings, discussed with patient, who verbalizes their understanding. Pt is lenin for safety and denies thoughts of SI/HI. Amenable to plan. No acute concerns to address regarding medications. Subjective:      Patient denies medication side effects apart from those mentioned in the assessment and plan. Patient reports they have been compliant with current medication regimen and have not missed a dose. Aggression:  [] yes  [x] no    Patient is [x] Able to contract for safety  [] unable to CONTRACT FOR SAFETY     ROS:  [x] All negative/unchanged except if checked.  Explain positive(checked items) below:     Denies any new or changed physical symptoms other than those noted in the subjective portion of this note   [] Constitutional  [] Eyes  [] Ear/Nose/Mouth/Throat  [] Respiratory  [] CV  [] GI  []   [] Musculoskeletal  [] Skin/Breast  [] Neurological  [] Endocrine  [] Heme/Lymph  [] Allergic/Immunologic    MEDICATIONS:    Current Outpatient Medications:     fluvoxaMINE (LUVOX) 25 MG tablet, Take 1 tablet by mouth nightly, Disp: 30 tablet, Rfl: 3    liothyronine (CYTOMEL) 25 MCG tablet, Take 1 tablet by mouth daily (Patient not taking: Reported on 11/29/2021), Disp: 30 tablet, Rfl: 3    Examination:    Vitals: not taken in person, most recent vitals in chart reviewed  Vitals:    11/29/21 0833   BP: 122/75   Pulse: 88       Wt Readings from Last 3 Encounters:   11/29/21 197 lb 12.8 oz (89.7 kg)   11/17/21 198 lb 6.4 oz (90 kg)   11/03/21 201 lb 12.8 oz (91.5 kg)       BP Readings from Last 3 Encounters:   11/29/21 122/75   11/17/21 130/74   11/03/21 107/65           Labs:   no recent labs    Mental Status Examination:    Level of consciousness:  alert and oriented to person, place, and situation  Appearance:  well-appearing good grooming and good hygiene  Behavior/Motor:  no abnormalities noted  Attitude toward examiner: friendly, pleasant and cooperative, attentive and good eye contact  Speech:  spontaneous, normal rate and normal volume   Mood: \"depressed\"  Affect:  mood congruent  Thought processes:  linear and logical  Thought content:  Denies suicidal or homicidal ideation, denies auditory or visual hallucinations  Cognition:  no deficits in attention, concentration notable, recent memory grossly intact  Concentration intact  Memory intact  Insight good   Judgement fair   Fund of Knowledge adequate    Treatment Plan:  Reviewed current Medications with the patient. Education provided on the compliance with treatment. Reviewed OARRs, no concerns identified     The anticipated benefits and side effects of the medications, including the anticipated results of not receiving the medication, and of alternatives to the medications were explained to the patient and their informed consent was obtained for starting medications as well as adjusting the doses (titration or tapering) as indicated. The above information was given by physician in verbal form and sufficient understanding was in evidence. The patient participated in discussion of the information and question and/or concerns were addressed before the medication was given. PSYCHOTHERAPY/COUNSELING:  Encourage patient to attend outpatient appointments and therapy. [x] Therapeutic interview  [] Supportive  [x] CBT  [x] Ongoing  [] Other    No follow-ups on file.  patient informed to call for follow-up or to reschedule appointment     Please note this report has been partially produced using speech recognition software  And may cause contain

## 2021-12-08 ENCOUNTER — OFFICE VISIT (OUTPATIENT)
Dept: BEHAVIORAL/MENTAL HEALTH CLINIC | Age: 36
End: 2021-12-08
Payer: COMMERCIAL

## 2021-12-08 VITALS
WEIGHT: 197.2 LBS | HEIGHT: 70 IN | DIASTOLIC BLOOD PRESSURE: 78 MMHG | BODY MASS INDEX: 28.23 KG/M2 | HEART RATE: 82 BPM | SYSTOLIC BLOOD PRESSURE: 129 MMHG

## 2021-12-08 DIAGNOSIS — F33.1 MAJOR DEPRESSIVE DISORDER, RECURRENT EPISODE, MODERATE (HCC): ICD-10-CM

## 2021-12-08 DIAGNOSIS — F41.1 GAD (GENERALIZED ANXIETY DISORDER): Primary | ICD-10-CM

## 2021-12-08 PROCEDURE — G8419 CALC BMI OUT NRM PARAM NOF/U: HCPCS | Performed by: PSYCHIATRY & NEUROLOGY

## 2021-12-08 PROCEDURE — 1036F TOBACCO NON-USER: CPT | Performed by: PSYCHIATRY & NEUROLOGY

## 2021-12-08 PROCEDURE — G8484 FLU IMMUNIZE NO ADMIN: HCPCS | Performed by: PSYCHIATRY & NEUROLOGY

## 2021-12-08 PROCEDURE — 99214 OFFICE O/P EST MOD 30 MIN: CPT | Performed by: PSYCHIATRY & NEUROLOGY

## 2021-12-08 PROCEDURE — G8427 DOCREV CUR MEDS BY ELIG CLIN: HCPCS | Performed by: PSYCHIATRY & NEUROLOGY

## 2021-12-08 RX ORDER — FLUVOXAMINE MALEATE 25 MG
TABLET ORAL
Qty: 84 TABLET | Refills: 0 | Status: SHIPPED | OUTPATIENT
Start: 2021-12-08 | End: 2021-12-23

## 2021-12-08 NOTE — PROGRESS NOTES
12/8/2021    IN PERSON Appointment PSYCHIATRY FOLLOWUP       Psychiatric Diagnoses:  1. [place] (generalized anxiety disorder)    2. Major depressive disorder, recurrent episode, moderate ([address]        30 mins total for this encounter = time in minutes with direct communication with patient face to face for appointment at [facility] OF THE [facility] point office location     Patient and Provider location: [address] [place] [address]     Assessment/Plan:     Patient denies thoughts of harm to self or others. No acute safety concerns voiced at this appointment. -     MOOD: STABLE: Patient reports mood as having been relatively good and stable. Appropriately responding to life stressors, and feels they are still able to still find enjoyment in things. SLEEP: GOOD/IMPROVED: Sleeping better, and reports is soundly through the night. No nightmares, nighttime awakenings. Well rested in the morning. Sleeping 8+ hours a night. Patient reports they have been engaging in light/moderate regular physical activity on a consistent basis. and Occasionally going for walks. ATTENTION AND FOCUS: No concerns. No recent issues related to difficulty with attention or focus. ANXIETY: ANXIETY CONTINUES TO BE A CONCERN: Patient reports frequent worrying throughout the day is affecting ability to perform day-to-day activities and/or interpersonal relationships. BIPOLAR SHENA: NO SHENA/HYPOMANIA REPORTED: Patient denies recent symptoms of shena including racing thoughts, increased goal-directed activity, decreased need for sleep, increased energy, persistently elevated or irritable mood, impulsivity, grandiosity, paranoid thoughts or other signs of shena. SUBSTANCE USE: No concerns related to substance use. Not applicable. and No concerns for ongoing substance use. Patient denies any recent substance use    ASSESSMENT/PLAN: Medication changes needed given the current presentation of symptoms.  Patient was amenable to plan related to Neurological  [] Endocrine  [] Heme/Lymph  [] Allergic/Immunologic    MEDICATIONS:    Current Outpatient Medications:     fluvoxaMINE (LUVOX) 25 MG tablet, Take 2 tablets by mouth nightly for 7 days, THEN 3 tablets nightly for 7 days, THEN 3 tablets nightly for 7 days, THEN 4 tablets nightly for 7 days. , Disp: 84 tablet, Rfl: 0    liothyronine (CYTOMEL) 25 MCG tablet, Take 1 tablet by mouth daily (Patient not taking: Reported on 11/29/2021), Disp: 30 tablet, Rfl: 3    Examination:    Vitals: not taken in person, most recent vitals in chart reviewed  Vitals:    12/08/21 0809   BP: 129/78   Pulse: 82       Wt Readings from Last 3 Encounters:   12/08/21 197 lb 3.2 oz (89.4 kg)   11/29/21 197 lb 12.8 oz (89.7 kg)   11/17/21 198 lb 6.4 oz (90 kg)       BP Readings from Last 3 Encounters:   12/08/21 129/78   11/29/21 122/75   11/17/21 130/74           Labs:   no recent labs    Mental Status Examination:    Level of consciousness:  alert and oriented to person, place, and situation  Appearance:  well-appearing good grooming and good hygiene  Behavior/Motor:  no abnormalities noted  Attitude toward examiner: friendly, pleasant and cooperative, attentive and good eye contact  Speech:  spontaneous, normal rate and normal volume   Mood: \"better\"  Affect:  mood congruent  Thought processes:  linear and logical  Thought content:  Denies suicidal or homicidal ideation, denies auditory or visual hallucinations  Cognition:  no deficits in attention, concentration notable, recent memory grossly intact  Concentration intact  Memory intact  Insight good   Judgement fair   Fund of Knowledge adequate    Treatment Plan:  Reviewed current Medications with the patient. Education provided on the compliance with treatment.    Reviewed OARRs, no concerns identified     The anticipated benefits and side effects of the medications, including the anticipated results of not receiving the medication, and of alternatives to the medications were explained to the patient and their informed consent was obtained for starting medications as well as adjusting the doses (titration or tapering) as indicated. The above information was given by physician in verbal form and sufficient understanding was in evidence. The patient participated in discussion of the information and question and/or concerns were addressed before the medication was given. PSYCHOTHERAPY/COUNSELING:  Encourage patient to attend outpatient appointments and therapy. [x] Therapeutic interview  [] Supportive  [x] CBT  [x] Ongoing  [] Other    No follow-ups on file. patient informed to call for follow-up or to reschedule appointment     Please note this report has been partially produced using speech recognition software  And may cause contain errors related to that system including grammar, punctuation and spelling as well as words and phrases that may seem inappropriate. If there are questions or concerns please feel free to contact me to clarify. Melissa Moore MD  Electronically signed by Melissa Moore MD on 12/8/2021 at 8:51 AM  12/8/2021 8:51 AM    Psychiatry     An  electronic signature was used to authenticate this note.   --Melissa Moore MD on 12/8/2021 at 8:51 AM

## 2021-12-23 RX ORDER — FLUVOXAMINE MALEATE 25 MG
TABLET ORAL
Qty: 30 TABLET | Refills: 3 | Status: SHIPPED | OUTPATIENT
Start: 2021-12-23 | End: 2022-04-05

## 2022-01-19 ENCOUNTER — VIRTUAL VISIT (OUTPATIENT)
Dept: BEHAVIORAL/MENTAL HEALTH CLINIC | Age: 37
End: 2022-01-19
Payer: COMMERCIAL

## 2022-01-19 DIAGNOSIS — F33.2 SEVERE EPISODE OF RECURRENT MAJOR DEPRESSIVE DISORDER, WITHOUT PSYCHOTIC FEATURES (HCC): Primary | ICD-10-CM

## 2022-01-19 DIAGNOSIS — G47.09 OTHER INSOMNIA: ICD-10-CM

## 2022-01-19 PROCEDURE — G8428 CUR MEDS NOT DOCUMENT: HCPCS | Performed by: PSYCHIATRY & NEUROLOGY

## 2022-01-19 PROCEDURE — 99214 OFFICE O/P EST MOD 30 MIN: CPT | Performed by: PSYCHIATRY & NEUROLOGY

## 2022-01-19 RX ORDER — LORAZEPAM 0.5 MG/1
0.5 TABLET ORAL NIGHTLY PRN
Qty: 7 TABLET | Refills: 0 | Status: SHIPPED | OUTPATIENT
Start: 2022-01-19 | End: 2022-01-26

## 2022-01-19 NOTE — PROGRESS NOTES
1/19/2022    30 mins total for this encounter =     20 minutes with direct communication with patient for encounter     10 mins (in addition) spent on chart review, and in the ordering of all necessary labs and/or medications      The risks and benefits of converting to a virtual visit were discussed in light of the current infectious disease epidemic. Patient also understood that insurance coverage and co-pays are up to their individual insurance plans. Patient Location:        Patient's home address  Provider Location ([facility]/State):        [address] (OUTPATIENT)   Audio/Visual (During LEMCX-33 public health emergency)          Psychiatric Diagnoses:  1. Severe episode of recurrent major depressive disorder, without psychotic features ([facility] [address]    2. Other insomnia        Assessment/Plan:   fluvoxamine (Luvox) couldn't tolerate at 50 mg, felt racing heart, very nervous. 25 mg has been helpful for energy. liothyronine (Cytomel) 25 mcg patient has not been taking. Missed our last appointments   Continues to have severe depression, low mood, apathy, anergia  Is open to electroconvulsive therapy (ECT). [phone]. lorazepam (Ativan) 0.5 mg every night as needed for insomnia. Patient is resistant to other medications with weight gain potential and generally has not been able to be consistent with the dosing of medications in the past, due to his apathy or due to reported side effects. electroconvulsive therapy (ECT) would be a good option for his mood. Recommend electroconvulsive therapy (ECT) for treatment of patient's severe depression. Patient states he will set up electroconvulsive therapy (ECT) today. Discussed risks and benefits and patient is amenable with plan. Will put him in contact with office. Patient denies thoughts of harm to self or others. No acute safety concerns voiced at this appointment.      MOOD: Continues to have the following symptoms of depression: low mood , anhedonia (difficulty finding enjoyment in things) , anergia (low energy) , change in appetite which is decreased, fatigue, sleeping more, tearful, crying easily, feelings of worthlessness, feelings of hopelessness, morbid ideation, missing work due to depression, decreased interest in things, guilty feelings, decreased concentration, excessive guilt, denies thoughts of harm to self or others  and denies suicidal ideation    SLEEP: WORSE: Reports sleep is worse, continues to struggle with sleep. Sleeping 6 hours a night. ATTENTION AND FOCUS: No concerns. No recent issues related to difficulty with attention or focus. Occasionally going for walks. ANXIETY: ANXIETY CONTINUES TO BE A CONCERN: Patient reports frequent worrying throughout the day is affecting ability to perform day-to-day activities and/or interpersonal relationships. BIPOLAR SHENA: NO SHENA/HYPOMANIA REPORTED: Patient denies recent symptoms of shena including racing thoughts, increased goal-directed activity, decreased need for sleep, increased energy, persistently elevated or irritable mood, impulsivity, grandiosity, paranoid thoughts or other signs of shena. SUBSTANCE USE: No concerns related to substance use. Not applicable. and No concerns for ongoing substance use. Patient denies any recent substance use    ASSESSMENT/PLAN: NO CHANGES TO MEDICATIONS: No medication changes were recommended at this appointment. Continue to encourage physical activity and adherence to medication regimen. Denies auditory or visual hallucinations. No paranoid thoughts, no delusional thought content expressed in this appointment. No thoughts of harm to self or others voiced. No acute concerns voiced. COUNSELING or THERAPY:   Continue to encourage therapy as part of ongoing treatment of their mental health concerns. Continue to encourage physical activity and adherence to medication regimen.      DATE and changes made  HPI            Medical Diagnoses:  Patient Active Problem List   Diagnosis    Adjustment disorder with mixed anxiety and depressed mood    Major depressive disorder, recurrent episode, moderate ([facility] [address]    [place] (generalized anxiety disorder)    Attention deficit hyperactivity disorder (ADHD), predominantly inattentive type    Severe episode of recurrent major depressive disorder, without psychotic features ([facility] [address]         AT TODAY'S VISIT     Crisis plan reviewed and patient verbally contracts for safety. Go to ED with emergent symptoms or safety concerns. Risks, benefits, side effects of medications, including any / all black box warnings, discussed with patient, who verbalizes their understanding. Pt is lenin for safety and denies thoughts of SI/HI. Amenable to plan. No acute concerns to address regarding medications. Subjective:      Patient denies medication side effects apart from those mentioned in the assessment and plan. Patient reports they have been compliant with current medication regimen and have not missed a dose. At today's visit, patient denies thoughts of harm to self or others since last appointment, and denies auditory or visual hallucinations. ROS:  [x] All negative/unchanged except if checked. Explain positive(checked items) below:       Denies any new or changed physical symptoms other than those noted in the subjective portion of this note   [] Constitutional  [] Eyes  [] Ear/Nose/Mouth/Throat  [] Respiratory  [] CV  [] GI  []   [] Musculoskeletal  [] Skin/Breast  [] Neurological  [] Endocrine  [] Heme/Lymph  [] Allergic/Immunologic    OBJECTIVE:   No results found for this or any previous visit (from the past 1008 hour(s)). MEDICATIONS:    Current Outpatient Medications:     LORazepam (ATIVAN) 0.5 MG tablet, Take 1 tablet by mouth nightly as needed (insomnia) for up to 7 days. , Disp: 7 tablet, Rfl: 0    fluvoxaMINE (LUVOX) 25 MG tablet, TAKE 1 TABLET BY MOUTH EVERY DAY AT NIGHT, Disp: 30 tablet, Rfl: 3    liothyronine (CYTOMEL) 25 MCG tablet, Take 1 tablet by mouth daily (Patient not taking: Reported on 11/29/2021), Disp: 30 tablet, Rfl: 3    Examination:    Vitals: not taken in person, most recent vitals in chart reviewed  There were no vitals filed for this visit. Wt Readings from Last 3 Encounters:   12/08/21 197 lb 3.2 oz (89.4 kg)   11/29/21 197 lb 12.8 oz (89.7 kg)   11/17/21 198 lb 6.4 oz (90 kg)       BP Readings from Last 3 Encounters:   12/08/21 129/78   11/29/21 122/75   11/17/21 130/74         Mental Status Examination:    Level of consciousness:  alert and oriented to person, place, and situation  Appearance: tired appearing  Behavior/Motor:  no abnormalities noted  Attitude toward examiner: apathetic   Speech:  spontaneous, normal rate and normal volume   Mood: \"worse\"  Affect:  blunted  Thought processes:  linear and logical  Thought content:  Denies suicidal or homicidal ideation, denies auditory or visual hallucinations  Cognition:  no deficits in attention, concentration notable, recent memory grossly intact  Concentration intact  Memory intact  Insight good   Judgement fair   Fund of Knowledge adequate    PSYCHOTHERAPY/COUNSELING:  Encourage patient to attend outpatient appointments and therapy. [x] Therapeutic interview  [] Supportive  [x] CBT  [x] Ongoing  [] Other    No follow-ups on file. patient informed to call for follow-up or to reschedule appointment     Please note this report has been partially produced using speech recognition software  And may cause contain errors related to that system including grammar, punctuation and spelling as well as words and phrases that may seem inappropriate. If there are questions or concerns please feel free to contact me to clarify.     Navin Santiago MD  Electronically signed by Navin Santiago MD on 1/26/2022 at 11:46 AM  1/26/2022 11:46 AM    Psychiatry   Due to this being a TeleHealth encounter, evaluation of the following organ systems is limited: Vitals/Constitutional/EENT/Resp/CV/GI//MS/Neuro/Skin/Heme-Lymph-Imm. An  electronic signature was used to authenticate this note. --Shey Tuttle MD on 1/26/2022 at 11:46 AM    Pursuant to the emergency declaration under the [address] [place] [address] [facility] authority and the [employer] Act, this Virtual  Visit was conducted, with patient's consent, to reduce the patient's risk of exposure to COVID-19 and provide continuity of care for an established patient Services were provided through a video synchronous discussion virtually to substitute for in-person clinic visit. Treatment Plan:  Reviewed current Medications with the patient. Education provided on the compliance with treatment. Reviewed OARRs, no concerns identified     The anticipated benefits and side effects of the medications, including the anticipated results of not receiving the medication, and of alternatives to the medications were explained to the patient and their informed consent was obtained for starting medications as well as adjusting the doses (titration or tapering) as indicated. The above information was given by physician in verbal form and sufficient understanding was in evidence. The patient participated in discussion of the information and question and/or concerns were addressed before the medication was given. Due to COVID 19 outbreak, patient's office visit was converted to a virtual visit.   Patient was contacted and agreed to proceed with a virtual visit via [employer]

## 2022-02-16 ENCOUNTER — OFFICE VISIT (OUTPATIENT)
Dept: BEHAVIORAL/MENTAL HEALTH CLINIC | Age: 37
End: 2022-02-16
Payer: COMMERCIAL

## 2022-02-16 DIAGNOSIS — F33.2 SEVERE EPISODE OF RECURRENT MAJOR DEPRESSIVE DISORDER, WITHOUT PSYCHOTIC FEATURES (HCC): Primary | ICD-10-CM

## 2022-02-16 PROCEDURE — G8419 CALC BMI OUT NRM PARAM NOF/U: HCPCS | Performed by: PSYCHIATRY & NEUROLOGY

## 2022-02-16 PROCEDURE — G8428 CUR MEDS NOT DOCUMENT: HCPCS | Performed by: PSYCHIATRY & NEUROLOGY

## 2022-02-16 PROCEDURE — G8484 FLU IMMUNIZE NO ADMIN: HCPCS | Performed by: PSYCHIATRY & NEUROLOGY

## 2022-02-16 PROCEDURE — 99214 OFFICE O/P EST MOD 30 MIN: CPT | Performed by: PSYCHIATRY & NEUROLOGY

## 2022-02-16 PROCEDURE — 1036F TOBACCO NON-USER: CPT | Performed by: PSYCHIATRY & NEUROLOGY

## 2022-02-16 NOTE — PROGRESS NOTES
2/16/2022    IN PERSON Appointment PSYCHIATRY FOLLOWUP       Psychiatric Diagnoses:  1. Severe episode of recurrent major depressive disorder, without psychotic features ([facility] [address]        Transcranial magnetic stimulation (TMS)     Past Psychiatric Meds: (at least 4)    1. abilify duration of treatment and reason for stopping medication: months worked but stopped working after some time  2. prozac duration of treatment and reason for stopping medication: 40 mg  partial benefit, incomplete remission of symptoms with maximum tolerated dose  3. Fluvoxamine (Luvox) duration of treatment and reason for stopping medication: 3 months  intolerable side effects   4. selegline duration of treatment and reason for stopping medication: 1 month intolerable side effects   5. lithium duration of treatment and reason for stopping medication: for a week  intolerable side effects   6. Vortioxetine (Trintellix) duration of treatment and reason for stopping medication: a month intolerable side effects   7. Venlafaxine XR (Effexor XR) duration of treatment and reason for stopping medication:  intolerable side effects    Patient has also engaged in 1:1 CBT for management of their symptoms, which did not result in resolution of psychiatric symptoms. PLAN:    1. Initiate treatment with repetitive transcranial magnetic stimulation, initial session 2/16/2022  2. Continue psychotherapy and psychotropic medication management with respective providers. 3.  Follow up for next session in approximately 1 week  4.   Patient has discussed with me the possible contraindications to transcranial magnetic stimulation (TMS) and it was determined that patient does not have any of the following:    Vagus nerve stimulator leads in the carotid sheath or implanted stimulators that use electrical or magnetic signals,    conductive or ferromagnetic or magnetic sensitive metals implanted or embedded in the head or neck (other than dental fillings),    acute or chronic psychotic symptoms,    seizure disorder or history of seizure disorder,    active substance use disorder,    severe dementia   Pacemaker            Patient denies thoughts of harm to self or others. No acute safety concerns voiced at this appointment. MOOD: LOW MOOD: Recently, patient has been experiencing symptoms of low mood , anhedonia (difficulty finding enjoyment in things) , anergia (low energy) , irritability/agitation, difficulty concentrating, tearful, crying easily, feelings of worthlessness, feelings of hopelessness, missing work due to depression, decreased interest in things, guilty feelings and decreased concentration. SLEEP: SLEEP DURATION: sleeping 10+ hours a night. Discussed with patient the importance of regular physical exercise as part of mind/body wellness. Discussed that evidence supports the use of routine exercise for treating symptoms of anxiety and depression. ATTENTION AND FOCUS: No concerns. No recent issues related to difficulty with attention or focus. ANXIETY: ANXIETY CONTINUES TO BE A CONCERN: Patient reports frequent worrying throughout the day is affecting ability to perform day-to-day activities and/or interpersonal relationships. BIPOLAR SHENA: NO SHENA/HYPOMANIA REPORTED: Patient denies recent symptoms of shena including racing thoughts, increased goal-directed activity, decreased need for sleep, increased energy, persistently elevated or irritable mood, impulsivity, grandiosity, paranoid thoughts or other signs of shena. SUBSTANCE USE: No concerns related to substance use. Not applicable. and No concerns for ongoing substance use. Patient denies any recent substance use    ASSESSMENT/PLAN: NO CHANGES TO MEDICATIONS: No medication changes were recommended at this appointment. Continue to encourage physical activity and adherence to medication regimen.  Patient has severe depression symptoms which have persisted, would like to try transcranial magnetic stimulation (TMS) as this was initially not completed due to severity of depression and apathy, now on fluvoxamine (Luvox) which has helped a bit with mood enough that he is motivated to try to complete transcranial magnetic stimulation ([address]), although he still is not attending to activities of daily living and remains severely depressed. Denies suicidal ideation Homicidal ideation. COUNSELING or THERAPY:   Continue to encourage therapy as part of ongoing treatment of their mental health concerns. Continue to encourage physical activity and adherence to medication regimen. DATE and changes made  HPI          Medical Diagnoses:  Patient Active Problem List   Diagnosis    Adjustment disorder with mixed anxiety and depressed mood    Major depressive disorder, recurrent episode, moderate ([place] [address]    [place] (generalized anxiety disorder)    Attention deficit hyperactivity disorder (ADHD), predominantly inattentive type    Severe episode of recurrent major depressive disorder, without psychotic features ([facility] [address]         AT TODAY'S VISIT     8. No Labs ordered today  9. Crisis plan reviewed and patient verbally contracts for safety. Go to ED with emergent symptoms or safety concerns. 10. Risks, benefits, side effects of medications, including any / all black box warnings, discussed with patient, who verbalizes their understanding. Pt is lenin for safety and denies thoughts of SI/HI. Amenable to plan. No acute concerns to address regarding medications. Subjective:      Patient denies medication side effects apart from those mentioned in the assessment and plan. Patient reports they have been compliant with current medication regimen and have not missed a dose. Aggression:  [] yes  [x] no    Patient is [x] Able to contract for safety  [] unable to CONTRACT FOR SAFETY     ROS:  [x] All negative/unchanged except if checked.  Explain positive(checked items) below:     Denies including the anticipated results of not receiving the medication, and of alternatives to the medications were explained to the patient and their informed consent was obtained for starting medications as well as adjusting the doses (titration or tapering) as indicated. The above information was given by physician in verbal form and sufficient understanding was in evidence. The patient participated in discussion of the information and question and/or concerns were addressed before the medication was given. PSYCHOTHERAPY/COUNSELING:  Encourage patient to attend outpatient appointments and therapy. [x] Therapeutic interview  [] Supportive  [x] CBT  [x] Ongoing  [] Other    No follow-ups on file. patient informed to call for follow-up or to reschedule appointment     Please note this report has been partially produced using speech recognition software  And may cause contain errors related to that system including grammar, punctuation and spelling as well as words and phrases that may seem inappropriate. If there are questions or concerns please feel free to contact me to clarify. Nella Frazier MD  Electronically signed by Nella Frazier MD on 2/16/2022 at 2:18 PM  2/16/2022 2:18 PM    Psychiatry     An  electronic signature was used to authenticate this note.   --Nella Frazier MD on 2/16/2022 at 2:18 PM

## 2022-04-05 ENCOUNTER — OFFICE VISIT (OUTPATIENT)
Dept: BEHAVIORAL/MENTAL HEALTH CLINIC | Age: 37
End: 2022-04-05
Payer: COMMERCIAL

## 2022-04-05 VITALS
DIASTOLIC BLOOD PRESSURE: 89 MMHG | BODY MASS INDEX: 28.05 KG/M2 | HEART RATE: 90 BPM | SYSTOLIC BLOOD PRESSURE: 133 MMHG | WEIGHT: 195.5 LBS

## 2022-04-05 DIAGNOSIS — F33.2 SEVERE EPISODE OF RECURRENT MAJOR DEPRESSIVE DISORDER, WITHOUT PSYCHOTIC FEATURES (HCC): ICD-10-CM

## 2022-04-05 DIAGNOSIS — G47.19 EXCESSIVE DAYTIME SLEEPINESS: ICD-10-CM

## 2022-04-05 DIAGNOSIS — F90.0 ATTENTION DEFICIT HYPERACTIVITY DISORDER (ADHD), PREDOMINANTLY INATTENTIVE TYPE: Primary | ICD-10-CM

## 2022-04-05 PROCEDURE — G8419 CALC BMI OUT NRM PARAM NOF/U: HCPCS | Performed by: PSYCHIATRY & NEUROLOGY

## 2022-04-05 PROCEDURE — G8428 CUR MEDS NOT DOCUMENT: HCPCS | Performed by: PSYCHIATRY & NEUROLOGY

## 2022-04-05 PROCEDURE — 99215 OFFICE O/P EST HI 40 MIN: CPT | Performed by: PSYCHIATRY & NEUROLOGY

## 2022-04-05 PROCEDURE — 1036F TOBACCO NON-USER: CPT | Performed by: PSYCHIATRY & NEUROLOGY

## 2022-04-05 RX ORDER — DEXTROAMPHETAMINE SACCHARATE, AMPHETAMINE ASPARTATE MONOHYDRATE, DEXTROAMPHETAMINE SULFATE AND AMPHETAMINE SULFATE 2.5; 2.5; 2.5; 2.5 MG/1; MG/1; MG/1; MG/1
10 CAPSULE, EXTENDED RELEASE ORAL DAILY
Qty: 20 CAPSULE | Refills: 0 | Status: SHIPPED | OUTPATIENT
Start: 2022-04-05 | End: 2022-04-19

## 2022-04-05 RX ORDER — PROPRANOLOL HYDROCHLORIDE 10 MG/1
10 TABLET ORAL 3 TIMES DAILY PRN
Qty: 90 TABLET | Refills: 3 | Status: SHIPPED | OUTPATIENT
Start: 2022-04-05 | End: 2022-07-20

## 2022-04-05 NOTE — PROGRESS NOTES
4/5/2022    IN PERSON Appointment PSYCHIATRY FOLLOWUP       Psychiatric Diagnoses:  1. Attention deficit hyperactivity disorder (ADHD), predominantly inattentive type    2. Excessive daytime sleepiness    3. Severe episode of recurrent major depressive disorder, without psychotic features ([facility] [address]            43 mins total for this encounter = time in minutes with direct communication with patient face to face for appointment at [facility] OF THE [facility] office location     Patient and Provider location: [address] [place] [address]     Assessment/Plan:     o -  SLEEP STUDY    Persistent daytime fatigue and excessive sleepiness    Difficult with sleeping    Feeling tired throughout the day every day despite adequate number of hours of sleep     Excessive snoring    Frequent nighttime awakenings    Observed apneic events at home    Discussed electroconvulsive therapy (ECT), patient is resistant to this at this time   Discussed starting potentially an MAOI if this is ineffective, patient was on selegiline (Emsam) but had side effects before     START dextroamphetamine/amphetamine salts (Adderall XR) 10 mg daily   propranolol hcl (Inderal) 10 mg three times daily as needed for anxiety       Patient denies thoughts of harm to self or others. No acute safety concerns voiced at this appointment. MOOD: WORSENED SYMPTOMS OF DEPRESSION: Patient reports worsening of symptoms of depression: low mood , anhedonia (difficulty finding enjoyment in things) , anergia (low energy) , fatigue, sleeping more, difficulty concentrating, tearful, crying easily, feelings of worthlessness and feelings of hopelessness. SLEEP: SLEEP DURATION: sleeping 12-13 hours a night. Occasionally going for walks. ATTENTION AND FOCUS: WELL-CONTROLLED (on medication): Patient reports improvement in attention and focus. Medication has helped with memory, impulse-control, concentration, focus, and attentiveness.  Has not had side effects from this (denies tachycardia, palpitations, or changes in appetite or sleep). ANXIETY: WELL-CONTROLLED WITH MEDICATION (with medication): Significant improvement in anxiety. Medication  has helped to decrease the frequency and severity of panic attacks and patient is noticing less overall anxiety. Able to utilize coping skills more effectively to manage anxiety. Patient reports minimal anxiety throughout the day and is able to accomplish goals and attend to activities of daily living. BIPOLAR SHENA: NO SHENA/HYPOMANIA REPORTED: Patient denies recent symptoms of shena including racing thoughts, increased goal-directed activity, decreased need for sleep, increased energy, persistently elevated or irritable mood, impulsivity, grandiosity, paranoid thoughts or other signs of shena. SUBSTANCE USE: No concerns related to substance use. Not applicable. and No concerns for ongoing substance use. Patient denies any recent substance use    ASSESSMENT/PLAN: Medication changes needed given the current presentation of symptoms. Patient was amenable to plan related to medications and endorsed understanding of the risks and benefits, which were explained and discussed. No acute concerns. No thoughts of harm to self or others voiced. COUNSELING or THERAPY:   Continue to encourage therapy as part of ongoing treatment of their mental health concerns. Continue to encourage physical activity and adherence to medication regimen. DATE and changes made  HPI          Medical Diagnoses:  Patient Active Problem List   Diagnosis    Adjustment disorder with mixed anxiety and depressed mood    Major depressive disorder, recurrent episode, moderate ([address]    WELLINGTON (generalized anxiety disorder)    Attention deficit hyperactivity disorder (ADHD), predominantly inattentive type    Severe episode of recurrent major depressive disorder, without psychotic features ([address]         AT TODAY'S VISIT     1. No Labs ordered today  2.  Crisis plan reviewed and patient verbally contracts for safety. Go to ED with emergent symptoms or safety concerns. 3. Risks, benefits, side effects of medications, including any / all black box warnings, discussed with patient, who verbalizes their understanding. Pt is lenin for safety and denies thoughts of SI/HI. Amenable to plan. No acute concerns to address regarding medications. Subjective:      Patient denies medication side effects apart from those mentioned in the assessment and plan. Patient reports they have been compliant with current medication regimen and have not missed a dose. Aggression:  [] yes  [x] no    Patient is [x] Able to contract for safety  [] unable to CONTRACT FOR SAFETY     ROS:  [x] All negative/unchanged except if checked. Explain positive(checked items) below:     Denies any new or changed physical symptoms other than those noted in the subjective portion of this note   [] Constitutional  [] Eyes  [] Ear/Nose/Mouth/Throat  [] Respiratory  [] CV  [] GI  []   [] Musculoskeletal  [] Skin/Breast  [] Neurological  [] Endocrine  [] Heme/Lymph  [] Allergic/Immunologic    MEDICATIONS:    Current Outpatient Medications:     propranolol (INDERAL) 10 MG tablet, Take 1 tablet by mouth 3 times daily as needed (anxiety), Disp: 90 tablet, Rfl: 3    amphetamine-dextroamphetamine (ADDERALL XR) 10 MG extended release capsule, Take 1 capsule by mouth daily for 20 days. , Disp: 20 capsule, Rfl: 0    Examination:    Vitals: not taken in person, most recent vitals in chart reviewed  Vitals:    04/05/22 1612   BP: 133/89   Pulse: 90       Wt Readings from Last 3 Encounters:   04/05/22 195 lb 8 oz (88.7 kg)   12/08/21 197 lb 3.2 oz (89.4 kg)   11/29/21 197 lb 12.8 oz (89.7 kg)       BP Readings from Last 3 Encounters:   04/05/22 133/89   12/08/21 129/78   11/29/21 122/75           Labs:   no recent labs    Mental Status Examination:    Level of consciousness:  alert and oriented to person, place, and situation  Appearance:  well-appearing good grooming and good hygiene  Behavior/Motor:  no abnormalities noted  Attitude toward examiner: very sad, very low, attentive and good eye contact  Speech:  spontaneous, normal rate and normal volume   Mood: \"worse\"  Affect:  mood congruent  Thought processes:  linear and logical  Thought content:  Denies suicidal or homicidal ideation, denies auditory or visual hallucinations  Cognition:  no deficits in attention, concentration notable, recent memory grossly intact  Concentration intact  Memory intact  Insight good   Judgement fair   Fund of Knowledge adequate    Treatment Plan:  Reviewed current Medications with the patient. Education provided on the compliance with treatment. Reviewed OARRs, no concerns identified     The anticipated benefits and side effects of the medications, including the anticipated results of not receiving the medication, and of alternatives to the medications were explained to the patient and their informed consent was obtained for starting medications as well as adjusting the doses (titration or tapering) as indicated. The above information was given by physician in verbal form and sufficient understanding was in evidence. The patient participated in discussion of the information and question and/or concerns were addressed before the medication was given. PSYCHOTHERAPY/COUNSELING:  Encourage patient to attend outpatient appointments and therapy. [x] Therapeutic interview  [] Supportive  [x] CBT  [x] Ongoing  [] Other    No follow-ups on file. patient informed to call for follow-up or to reschedule appointment     Please note this report has been partially produced using speech recognition software  And may cause contain errors related to that system including grammar, punctuation and spelling as well as words and phrases that may seem inappropriate.  If there are questions or concerns please feel free to contact me to clarify. Angelo Key MD  Electronically signed by Angelo Key MD on 4/5/2022 at 4:44 PM  4/5/2022 4:44 PM    Psychiatry     An  electronic signature was used to authenticate this note.   --Angelo Key MD on 4/5/2022 at 4:44 PM

## 2022-04-12 ENCOUNTER — TELEPHONE (OUTPATIENT)
Dept: BEHAVIORAL/MENTAL HEALTH CLINIC | Age: 37
End: 2022-04-12

## 2022-04-12 NOTE — TELEPHONE ENCOUNTER
Patient states since starting to take Adderall 10 mg in the AM and PROPANOLOL 20 mg 2-3 times daily has been having increased anxiety, SOB and insomnia. Asking if you would call him to discuss. Scheduled to be seen on 4/19/2022. NO sooner appointments available at this time. Please advise.

## 2022-04-14 NOTE — TELEPHONE ENCOUNTER
OK TO hold off on medication if he needs to stop this until we can discuss at his appointment.      Yoselin Claire MD

## 2022-04-19 ENCOUNTER — OFFICE VISIT (OUTPATIENT)
Dept: BEHAVIORAL/MENTAL HEALTH CLINIC | Age: 37
End: 2022-04-19
Payer: COMMERCIAL

## 2022-04-19 DIAGNOSIS — F90.0 ATTENTION DEFICIT HYPERACTIVITY DISORDER (ADHD), PREDOMINANTLY INATTENTIVE TYPE: Primary | ICD-10-CM

## 2022-04-19 DIAGNOSIS — F41.1 GAD (GENERALIZED ANXIETY DISORDER): ICD-10-CM

## 2022-04-19 DIAGNOSIS — F33.2 SEVERE EPISODE OF RECURRENT MAJOR DEPRESSIVE DISORDER, WITHOUT PSYCHOTIC FEATURES (HCC): ICD-10-CM

## 2022-04-19 PROCEDURE — G8419 CALC BMI OUT NRM PARAM NOF/U: HCPCS | Performed by: PSYCHIATRY & NEUROLOGY

## 2022-04-19 PROCEDURE — 1036F TOBACCO NON-USER: CPT | Performed by: PSYCHIATRY & NEUROLOGY

## 2022-04-19 PROCEDURE — G8428 CUR MEDS NOT DOCUMENT: HCPCS | Performed by: PSYCHIATRY & NEUROLOGY

## 2022-04-19 PROCEDURE — 99215 OFFICE O/P EST HI 40 MIN: CPT | Performed by: PSYCHIATRY & NEUROLOGY

## 2022-04-19 RX ORDER — PROPRANOLOL HYDROCHLORIDE 10 MG/1
10 TABLET ORAL 3 TIMES DAILY PRN
Qty: 90 TABLET | Refills: 3 | Status: SHIPPED | OUTPATIENT
Start: 2022-04-19 | End: 2022-07-20 | Stop reason: SDUPTHER

## 2022-04-19 RX ORDER — LORAZEPAM 0.5 MG/1
0.5 TABLET ORAL 3 TIMES DAILY PRN
Qty: 45 TABLET | Refills: 0 | Status: SHIPPED | OUTPATIENT
Start: 2022-04-19 | End: 2022-05-03 | Stop reason: SDUPTHER

## 2022-04-19 RX ORDER — DEXTROAMPHETAMINE SACCHARATE, AMPHETAMINE ASPARTATE MONOHYDRATE, DEXTROAMPHETAMINE SULFATE AND AMPHETAMINE SULFATE 3.75; 3.75; 3.75; 3.75 MG/1; MG/1; MG/1; MG/1
15 CAPSULE, EXTENDED RELEASE ORAL DAILY
Qty: 15 CAPSULE | Refills: 0 | Status: SHIPPED | OUTPATIENT
Start: 2022-04-19 | End: 2022-04-26

## 2022-04-19 NOTE — PROGRESS NOTES
4/19/2022    IN PERSON Appointment PSYCHIATRY FOLLOWUP       Psychiatric Diagnoses:  1. Attention deficit hyperactivity disorder (ADHD), predominantly inattentive type    2. WELL[place] (generalized anxiety disorder)    3. Severe episode of recurrent major depressive disorder, without psychotic features ([facility] [address]        43 mins total for this encounter = time in minutes with direct communication with patient face to face for appointment at [facility] OF THE [facility] point office location     Patient and Provider location: [address] [place] [address]     Assessment/Plan:   Continued severe depression symptoms, however slightly better with dextroamphetamine/amphetamine salts (Adderall XR). More energy and motivation. Otherwise still jagruti low, apathetic, significant anxiety, isolating to self, not caring for activities of daily living consistently. Patient denies thoughts of harm to self or others. No acute safety concerns voiced at this appointment. MOOD: IMPROVEMENT with SOME CONTINUED SYMPTOMS: Patient reports improvement in some symptoms of depression. Continues to have symptoms of low mood , anhedonia (difficulty finding enjoyment in things) , anergia (low energy) , irritability/agitation, motivation is low , fatigue, sleeping more, difficulty concentrating, tearful, crying easily, feelings of worthlessness, feelings of hopelessness and missing work due to depression. Despite continued symptoms, patient feels able to attend to activities of daily living. SLEEP: SLEEP DURATION: sleeping 10-11 hours a night. Occasionally going for walks. ATTENTION AND FOCUS: SIGNIFICANT CONCERNS RELATED TO ATTENTION AND FOCUS: Continues to struggle with attention and focus, difficulty with listening when spoken to directly, losing things often, which has been a chronic concern.      ANXIETY: ANXIOUS ABOUT LIFE CIRCUMSTANCES: Patient reports continued symptoms of anxiety, which are largely due to concerns related to not being able to pass his test. Anxiety seem proportionate to life stressors and patient is managing this in a healthy way. BIPOLAR SHENA: NO SHENA/HYPOMANIA REPORTED: Patient denies recent symptoms of shena including racing thoughts, increased goal-directed activity, decreased need for sleep, increased energy, persistently elevated or irritable mood, impulsivity, grandiosity, paranoid thoughts or other signs of shena. SUBSTANCE USE: No concerns related to substance use. Not applicable. and No concerns for ongoing substance use. Patient denies any recent substance use    ASSESSMENT/PLAN: Medication changes needed given the current presentation of symptoms. Patient was amenable to plan related to medications and endorsed understanding of the risks and benefits, which were explained and discussed. No acute concerns. No thoughts of harm to self or others voiced. COUNSELING or THERAPY:   Continue to encourage therapy as part of ongoing treatment of their mental health concerns. Continue to encourage physical activity and adherence to medication regimen.      DATE and changes made  Wellbutrin (made more irritable) has been on a Effexor for a year, lexapro, haldol for tic disorder that is resolved,   Seroquel,   Strattera,   Vyvanse,   Ritalin,   Adderall,   prozac,   lithium,   Latuda,   vortioxetine (Trintellix), and   sertraline (Zoloft) and   Abilify (aripiprazole)   Clomipramine (Anafranil)   Discussed electroconvulsive therapy (ECT), patient is resistant to this at this time   Discussed starting potentially an MAOI if this is ineffective,   patient was on selegiline (Emsam) but had side effects before (dizziness)  Fluvoxamine (Luvox) caused tachycardia at 50 mg daily     12/7/21  -fluvoxamine (Luvox) 25 mg daily   -Stop clomipramine (Anafranil) and liothyronine (Cytomel)      4/5/22  -  SLEEP STUDY   -Persistent daytime fatigue and excessive sleepiness   -Difficult with sleeping   -Feeling tired throughout the day every day despite adequate number of hours of sleep    -Excessive snoring   -Frequent nighttime awakenings   -Observed apneic events at home     START dextroamphetamine/amphetamine salts (Adderall XR) 10 mg daily   propranolol hcl (Inderal) 10 mg three times daily as needed for anxiety                        4/19/22  -START lorazepam (Ativan) 0.5 mg three times daily as needed for anxiety   -propranolol hcl (Inderal) 10 mg three times daily as needed for anxiety   -INCREASE dextroamphetamine/amphetamine salts (Adderall XR) from 10 mg to 15 mg               Medical Diagnoses:  Patient Active Problem List   Diagnosis    Adjustment disorder with mixed anxiety and depressed mood    Major depressive disorder, recurrent episode, moderate ([address]    WELLINGTON (generalized anxiety disorder)    Attention deficit hyperactivity disorder (ADHD), predominantly inattentive type    Severe episode of recurrent major depressive disorder, without psychotic features ([facility] [address]         AT TODAY'S VISIT     1. No Labs ordered today  2. Crisis plan reviewed and patient verbally contracts for safety. Go to ED with emergent symptoms or safety concerns. 3. Risks, benefits, side effects of medications, including any / all black box warnings, discussed with patient, who verbalizes their understanding. Pt is lenin for safety and denies thoughts of SI/HI. Amenable to plan. No acute concerns to address regarding medications. Subjective:      Patient denies medication side effects apart from those mentioned in the assessment and plan. Patient reports they have been compliant with current medication regimen and have not missed a dose. Aggression:  [] yes  [x] no    Patient is [x] Able to contract for safety  [] unable to CONTRACT FOR SAFETY     ROS:  [x] All negative/unchanged except if checked.  Explain positive(checked items) below:     Denies any new or changed physical symptoms other than those noted in the subjective portion of this note   [] Constitutional  [] Eyes  [] Ear/Nose/Mouth/Throat  [] Respiratory  [] CV  [] GI  []   [] Musculoskeletal  [] Skin/Breast  [] Neurological  [] Endocrine  [] Heme/Lymph  [] Allergic/Immunologic    MEDICATIONS:    Current Outpatient Medications:     LORazepam (ATIVAN) 0.5 MG tablet, Take 1 tablet by mouth 3 times daily as needed for Anxiety for up to 15 days. , Disp: 45 tablet, Rfl: 0    propranolol (INDERAL) 10 MG tablet, Take 1 tablet by mouth 3 times daily as needed (anxiety), Disp: 90 tablet, Rfl: 3    amphetamine-dextroamphetamine (ADDERALL XR) 10 MG extended release capsule, Take 1 capsule by mouth daily for 15 days. , Disp: 15 capsule, Rfl: 0    propranolol (INDERAL) 10 MG tablet, Take 1 tablet by mouth 3 times daily as needed (anxiety), Disp: 90 tablet, Rfl: 3    Examination:    Vitals: not taken in person, most recent vitals in chart reviewed  There were no vitals filed for this visit.     Wt Readings from Last 3 Encounters:   04/05/22 195 lb 8 oz (88.7 kg)   12/08/21 197 lb 3.2 oz (89.4 kg)   11/29/21 197 lb 12.8 oz (89.7 kg)       BP Readings from Last 3 Encounters:   04/05/22 133/89   12/08/21 129/78   11/29/21 122/75           Labs:   no recent labs    Mental Status Examination:    Level of consciousness:  alert and oriented to person, place, and situation  Appearance:  well-appearing good grooming and good hygiene  Behavior/Motor:  no abnormalities noted  Attitude toward examiner:appears sad, attentive and good eye contact  Speech: soft spontaneous, normal rate and normal volume   Mood: \"still really bad\"  Affect:  mood congruent  Thought processes:  linear and logical  Thought content:  Denies suicidal or homicidal ideation, denies auditory or visual hallucinations  Cognition:  no deficits in attention, concentration notable, recent memory grossly intact  Concentration intact  Memory intact  Insight good   Judgement fair   Fund of Knowledge adequate    Treatment Plan:  Reviewed current Medications with the patient. Education provided on the compliance with treatment. Reviewed OARRs, no concerns identified     The anticipated benefits and side effects of the medications, including the anticipated results of not receiving the medication, and of alternatives to the medications were explained to the patient and their informed consent was obtained for starting medications as well as adjusting the doses (titration or tapering) as indicated. The above information was given by physician in verbal form and sufficient understanding was in evidence. The patient participated in discussion of the information and question and/or concerns were addressed before the medication was given. PSYCHOTHERAPY/COUNSELING:  Encourage patient to attend outpatient appointments and therapy. [x] Therapeutic interview  [] Supportive  [x] CBT  [x] Ongoing  [] Other    No follow-ups on file. patient informed to call for follow-up or to reschedule appointment     Please note this report has been partially produced using speech recognition software  And may cause contain errors related to that system including grammar, punctuation and spelling as well as words and phrases that may seem inappropriate. If there are questions or concerns please feel free to contact me to clarify. Jeff Wheeler MD  Electronically signed by Jeff Wheeler MD on 4/27/2022 at 3:25 PM  4/27/2022 3:25 PM    Psychiatry     An  electronic signature was used to authenticate this note.   --Jeff Wheeler MD on 4/27/2022 at 3:25 PM

## 2022-04-21 ENCOUNTER — TELEPHONE (OUTPATIENT)
Dept: BEHAVIORAL/MENTAL HEALTH CLINIC | Age: 37
End: 2022-04-21

## 2022-04-21 DIAGNOSIS — F90.0 ATTENTION DEFICIT HYPERACTIVITY DISORDER (ADHD), PREDOMINANTLY INATTENTIVE TYPE: Primary | ICD-10-CM

## 2022-04-21 NOTE — TELEPHONE ENCOUNTER
Patient states at last appointment you were wanting him to increase his adderall dose. He is very anxious about increasing the dose. Asking if it is ok to stay on his current dose. Please advise.

## 2022-04-22 NOTE — TELEPHONE ENCOUNTER
REFILL REQUEST UPDATE FROM PHYSICIAN    Requested Prescriptions      No prescriptions requested or ordered in this encounter       The following medications were refilled per patient request to the pharmacy identified as patient's preference pharmacy. Prior to refill, the [place] has been reviewed as needed for controlled substance monitoring and no concerns were identified. No orders of the defined types were placed in this encounter. Please inform the patient of any pending lab work, so they may complete this prior to the next medication fill.      Thien Flores MD

## 2022-04-26 RX ORDER — DEXTROAMPHETAMINE SACCHARATE, AMPHETAMINE ASPARTATE MONOHYDRATE, DEXTROAMPHETAMINE SULFATE AND AMPHETAMINE SULFATE 2.5; 2.5; 2.5; 2.5 MG/1; MG/1; MG/1; MG/1
10 CAPSULE, EXTENDED RELEASE ORAL DAILY
Qty: 15 CAPSULE | Refills: 0 | Status: SHIPPED | OUTPATIENT
Start: 2022-04-26 | End: 2022-05-03

## 2022-04-26 NOTE — TELEPHONE ENCOUNTER
REFILL REQUEST UPDATE FROM PHYSICIAN    OK to go back to 10 mg dextroamphetamine/amphetamine salts (Adderall XR). Can you please let him know I will send this to his pharmacy. Orders Placed This Encounter   Medications    amphetamine-dextroamphetamine (ADDERALL XR) 10 MG extended release capsule     Sig: Take 1 capsule by mouth daily for 15 days.      Dispense:  15 capsule     Refill:  0         Sowmya Payne MD

## 2022-05-03 ENCOUNTER — OFFICE VISIT (OUTPATIENT)
Dept: BEHAVIORAL/MENTAL HEALTH CLINIC | Age: 37
End: 2022-05-03
Payer: COMMERCIAL

## 2022-05-03 DIAGNOSIS — F33.2 SEVERE EPISODE OF RECURRENT MAJOR DEPRESSIVE DISORDER, WITHOUT PSYCHOTIC FEATURES (HCC): Primary | ICD-10-CM

## 2022-05-03 DIAGNOSIS — F41.1 GAD (GENERALIZED ANXIETY DISORDER): ICD-10-CM

## 2022-05-03 PROCEDURE — G8428 CUR MEDS NOT DOCUMENT: HCPCS | Performed by: PSYCHIATRY & NEUROLOGY

## 2022-05-03 PROCEDURE — G8419 CALC BMI OUT NRM PARAM NOF/U: HCPCS | Performed by: PSYCHIATRY & NEUROLOGY

## 2022-05-03 PROCEDURE — 1036F TOBACCO NON-USER: CPT | Performed by: PSYCHIATRY & NEUROLOGY

## 2022-05-03 PROCEDURE — 99214 OFFICE O/P EST MOD 30 MIN: CPT | Performed by: PSYCHIATRY & NEUROLOGY

## 2022-05-03 PROCEDURE — 90833 PSYTX W PT W E/M 30 MIN: CPT | Performed by: PSYCHIATRY & NEUROLOGY

## 2022-05-03 RX ORDER — SERTRALINE HYDROCHLORIDE 100 MG/1
TABLET, FILM COATED ORAL
Qty: 53 TABLET | Refills: 0 | Status: SHIPPED | OUTPATIENT
Start: 2022-05-03 | End: 2022-05-27

## 2022-05-03 RX ORDER — LORAZEPAM 0.5 MG/1
0.5 TABLET ORAL 3 TIMES DAILY PRN
Qty: 45 TABLET | Refills: 1 | Status: SHIPPED | OUTPATIENT
Start: 2022-05-03 | End: 2022-06-07 | Stop reason: SDUPTHER

## 2022-05-27 RX ORDER — SERTRALINE HYDROCHLORIDE 100 MG/1
200 TABLET, FILM COATED ORAL DAILY
Qty: 60 TABLET | Refills: 2 | Status: SHIPPED | OUTPATIENT
Start: 2022-05-27 | End: 2022-07-20 | Stop reason: SDUPTHER

## 2022-06-06 DIAGNOSIS — F41.1 GAD (GENERALIZED ANXIETY DISORDER): ICD-10-CM

## 2022-06-06 NOTE — TELEPHONE ENCOUNTER
requesting medication refill. Rx requested:  Requested Prescriptions     Pending Prescriptions Disp Refills    LORazepam (ATIVAN) 0.5 MG tablet 45 tablet 1     Sig: Take 1 tablet by mouth 3 times daily as needed for Anxiety for up to 30 days.        Last Office Visit:   5/3/2022    Last Tox screen:      Last Medication contract:      Last refilled on :5/3/2022      Next Visit Date:  Future Appointments   Date Time Provider [facility] [place]   6/7/2022  3:30 PM [address] [place] [address]   6/13/2022  3:00 PM Tequila Mackey MD [place]

## 2022-06-07 ENCOUNTER — HOSPITAL ENCOUNTER (OUTPATIENT)
Dept: SLEEP CENTER | Age: 37
Discharge: HOME | End: 2022-06-09
Payer: COMMERCIAL

## 2022-06-07 PROCEDURE — 95806 SLEEP STUDY UNATT&RESP EFFT: CPT

## 2022-06-07 RX ORDER — LORAZEPAM 0.5 MG/1
0.5 TABLET ORAL 3 TIMES DAILY PRN
Qty: 45 TABLET | Refills: 1 | Status: SHIPPED | OUTPATIENT
Start: 2022-06-07 | End: 2022-08-16 | Stop reason: SDUPTHER

## 2022-06-11 PROCEDURE — 95806 SLEEP STUDY UNATT&RESP EFFT: CPT | Performed by: INTERNAL MEDICINE

## 2022-06-13 ENCOUNTER — OFFICE VISIT (OUTPATIENT)
Dept: BEHAVIORAL/MENTAL HEALTH CLINIC | Age: 37
End: 2022-06-13
Payer: COMMERCIAL

## 2022-06-13 VITALS
DIASTOLIC BLOOD PRESSURE: 75 MMHG | HEART RATE: 85 BPM | SYSTOLIC BLOOD PRESSURE: 124 MMHG | BODY MASS INDEX: 25.68 KG/M2 | WEIGHT: 179 LBS

## 2022-06-13 DIAGNOSIS — F41.1 GAD (GENERALIZED ANXIETY DISORDER): ICD-10-CM

## 2022-06-13 DIAGNOSIS — G47.19 EXCESSIVE DAYTIME SLEEPINESS: ICD-10-CM

## 2022-06-13 DIAGNOSIS — F90.0 ATTENTION DEFICIT HYPERACTIVITY DISORDER (ADHD), PREDOMINANTLY INATTENTIVE TYPE: ICD-10-CM

## 2022-06-13 DIAGNOSIS — F33.1 MODERATE EPISODE OF RECURRENT MAJOR DEPRESSIVE DISORDER (HCC): Primary | ICD-10-CM

## 2022-06-13 PROCEDURE — 90833 PSYTX W PT W E/M 30 MIN: CPT | Performed by: PSYCHIATRY & NEUROLOGY

## 2022-06-13 PROCEDURE — 99214 OFFICE O/P EST MOD 30 MIN: CPT | Performed by: PSYCHIATRY & NEUROLOGY

## 2022-06-13 PROCEDURE — 1036F TOBACCO NON-USER: CPT | Performed by: PSYCHIATRY & NEUROLOGY

## 2022-06-13 PROCEDURE — G8419 CALC BMI OUT NRM PARAM NOF/U: HCPCS | Performed by: PSYCHIATRY & NEUROLOGY

## 2022-06-13 PROCEDURE — G8428 CUR MEDS NOT DOCUMENT: HCPCS | Performed by: PSYCHIATRY & NEUROLOGY

## 2022-06-13 NOTE — PROGRESS NOTES
6/13/2022    IN PERSON Appointment PSYCHIATRY FOLLOWUP FOR MANAGEMENT OF   Chief Complaint   Patient presents with    Depression     Medication Follow up       The medical services provided are not counted in the number of time spent on psychotherapy with the patient        Psychotherapy note: ??????????????????_28_ Minutes of psychotherapy (separate from time spent on discussion and management of medications)  ? ?? Supportive psychotherapy, Patient discussed certain situational and personal stressors ongoing in her life at this time, weight management d/w the patient. Sleep hygiene d/w patient. Patient allowed to vent out his/her emotions. Scenarios were reviewed using role playing and CBT techniques in order to increase insight and decrease anxiety. Discussed with patient: Significant issues related to his self-esteem and his willingness to try to be vulnerable in trying to take this occupational therapy test again. Discussed his automatic negative thoughts regarding his own self worth and regarding the potential outcome of the exam which is part of the CBT therapy we are engaging in. He did reflect well in this therapeutic session on the fact that he knows that he has developed more automatic negative thoughts and we tried to identify some more of those in the session. Provided supportive therapy as well and encouraging him to attempt to this exam again with the understanding that even if he does not pass he will still survive that and he will be okay and in no worse position. Psychiatric Diagnoses:  1. Moderate episode of recurrent major depressive disorder ([address]    2. WELLINGTON (generalized anxiety disorder)    3. Attention deficit hyperactivity disorder (ADHD), predominantly inattentive type               Patient and Provider location: [address] [place] [address]     Assessment/Plan:   Continue Ativan continue Ativan, as needed for severe anxiety. He has been taking this sparingly.   Continue (made more irritable) has been on a Effexor for a year,   lexapro,   haldol for tic disorder that is resolved,   Seroquel,   Strattera,   Vyvanse,   Ritalin,   Adderall,   prozac,   lithium,   Latuda,   vortioxetine (Trintellix), and   sertraline (Zoloft) and   Abilify (aripiprazole)   Clomipramine (Anafranil)   Dextroamphetamine/amphetamine salts (Adderall XR) made more anxious   Lorazepam (Ativan)    Discussed electroconvulsive therapy (ECT), patient is resistant to this at this time   Discussed starting potentially an MAOI if this is ineffective,   patient was on selegiline (Emsam) but had side effects before (dizziness)  Fluvoxamine (Luvox) caused tachycardia at 50 mg daily      12/7/21  -fluvoxamine (Luvox) 25 mg daily   -Stop clomipramine (Anafranil) and liothyronine (Cytomel)        4/5/22  -  SLEEP STUDY   -Persistent daytime fatigue and excessive sleepiness   -Difficult with sleeping   -Feeling tired throughout the day every day despite adequate number of hours of sleep    -Excessive snoring   -Frequent nighttime awakenings   -Observed apneic events at home     START dextroamphetamine/amphetamine salts (Adderall XR) 10 mg daily   propranolol hcl (Inderal) 10 mg three times daily as needed for anxiety                        4/19/22  -START lorazepam (Ativan) 0.5 mg three times daily as needed for anxiety   -propranolol hcl (Inderal) 10 mg three times daily as needed for anxiety   -INCREASE dextroamphetamine/amphetamine salts (Adderall XR) from 10 mg to 15 mg                        5/3/22  -lorazepam (Ativan) 0.5 mg three times daily as needed for anxiety   -propranolol hcl (Inderal) 10 mg three times daily as needed for anxiety   -STOP dextroamphetamine/amphetamine salts (Adderall XR), patient says this was helping motivation some but worsening anxiety  - sertraline (Zoloft)                             6/13/22  -same stuff, cleaning the stuff when he got back from [place]    -hasn't been spending time with friends   -lorazepam (Ativan) helps intrusive thoughts   -Thinks he's going to take the test   -family is going   -sertraline (Zoloft) 100 mg daily   Wt Readings from Last 3 Encounters:  06/13/22 : 179 lb (81.2 kg)  04/05/22 : 195 lb 8 oz (88.7 kg)  12/08/21 : 197 lb 3.2 oz (89.4 kg)              Medical Diagnoses:  Patient Active Problem List   Diagnosis    Adjustment disorder with mixed anxiety and depressed mood    Major depressive disorder, recurrent episode, moderate ([address]    WELLINGTON (generalized anxiety disorder)    Attention deficit hyperactivity disorder (ADHD), predominantly inattentive type    Severe episode of recurrent major depressive disorder, without psychotic features ([address]    Sleep disorder breathing         AT TODAY'S VISIT     1. No Labs ordered today  2. Crisis plan reviewed and patient verbally contracts for safety. Go to ED with emergent symptoms or safety concerns. 3. Risks, benefits, side effects of medications, including any / all black box warnings, discussed with patient, who verbalizes their understanding. Pt is lenin for safety and denies thoughts of SI/HI. Amenable to plan. No acute concerns to address regarding medications. Subjective:      Patient denies medication side effects apart from those mentioned in the assessment and plan. Patient reports they have been compliant with current medication regimen and have not missed a dose. Aggression:  [] yes  [x] no    Patient is [x] Able to contract for safety  [] unable to CONTRACT FOR SAFETY     ROS:  [x] All negative/unchanged except if checked.  Explain positive(checked items) below:     Denies any new or changed physical symptoms other than those noted in the subjective portion of this note   [] Constitutional  [] Eyes  [] Ear/Nose/Mouth/Throat  [] Respiratory  [] CV  [] GI  []   [] Musculoskeletal  [] Skin/Breast  [] Neurological  [] Endocrine  [] Heme/Lymph  [] Allergic/Immunologic    MEDICATIONS:    Current Outpatient Medications:     LORazepam (ATIVAN) 0.5 MG tablet, Take 1 tablet by mouth 3 times daily as needed for Anxiety for up to 30 days. , Disp: 45 tablet, Rfl: 1    sertraline (ZOLOFT) 100 MG tablet, Take 2 tablets by mouth daily, Disp: 60 tablet, Rfl: 2    propranolol (INDERAL) 10 MG tablet, Take 1 tablet by mouth 3 times daily as needed (anxiety), Disp: 90 tablet, Rfl: 3    propranolol (INDERAL) 10 MG tablet, Take 1 tablet by mouth 3 times daily as needed (anxiety), Disp: 90 tablet, Rfl: 3    Examination:    Vitals: not taken in person, most recent vitals in chart reviewed  Vitals:    06/13/22 1456   BP: 124/75   Pulse: 85       Wt Readings from Last 3 Encounters:   06/13/22 179 lb (81.2 kg)   04/05/22 195 lb 8 oz (88.7 kg)   12/08/21 197 lb 3.2 oz (89.4 kg)       BP Readings from Last 3 Encounters:   06/13/22 124/75   04/05/22 133/89   12/08/21 129/78           Labs:   no recent labs    Mental Status Examination:    Level of consciousness:  alert and oriented to person, place, and situation  Appearance:  well-appearing good grooming and good hygiene  Behavior/Motor:  no abnormalities noted  Attitude toward examiner: friendly, pleasant and cooperative, attentive and good eye contact  Speech:  spontaneous, normal rate and normal volume   Mood: \"good\"  Affect:  mood congruent  Thought processes:  linear and logical  Thought content:  Denies suicidal or homicidal ideation, denies auditory or visual hallucinations  Cognition:  no deficits in attention, concentration notable, recent memory grossly intact  Concentration intact  Memory intact  Insight good   Judgement fair   Fund of Knowledge adequate    Treatment Plan:  Reviewed current Medications with the patient. Education provided on the compliance with treatment.    Reviewed OARRs, no concerns identified     The anticipated benefits and side effects of the medications, including the anticipated results of not receiving the medication, and of alternatives to the medications were explained to the patient and their informed consent was obtained for starting medications as well as adjusting the doses (titration or tapering) as indicated. The above information was given by physician in verbal form and sufficient understanding was in evidence. The patient participated in discussion of the information and question and/or concerns were addressed before the medication was given. PSYCHOTHERAPY/COUNSELING:  Encourage patient to attend outpatient appointments and therapy. [x] Therapeutic interview  [] Supportive  [x] CBT  [x] Ongoing  [] Other    No follow-ups on file. patient informed to call for follow-up or to reschedule appointment     Please note this report has been partially produced using speech recognition software  And may cause contain errors related to that system including grammar, punctuation and spelling as well as words and phrases that may seem inappropriate. If there are questions or concerns please feel free to contact me to clarify. Ramiro Comer MD  Electronically signed by Ramiro Comer MD on 6/24/2022 at 3:17 PM  6/24/2022 3:17 PM    Psychiatry     An  electronic signature was used to authenticate this note.   --Ramiro Comer MD on 6/24/2022 at 3:17 PM

## 2022-07-20 ENCOUNTER — OFFICE VISIT (OUTPATIENT)
Dept: BEHAVIORAL/MENTAL HEALTH CLINIC | Age: 37
End: 2022-07-20
Payer: COMMERCIAL

## 2022-07-20 VITALS
SYSTOLIC BLOOD PRESSURE: 130 MMHG | DIASTOLIC BLOOD PRESSURE: 72 MMHG | BODY MASS INDEX: 25.25 KG/M2 | HEART RATE: 82 BPM | WEIGHT: 176 LBS

## 2022-07-20 DIAGNOSIS — F41.1 GAD (GENERALIZED ANXIETY DISORDER): ICD-10-CM

## 2022-07-20 DIAGNOSIS — F90.0 ATTENTION DEFICIT HYPERACTIVITY DISORDER (ADHD), PREDOMINANTLY INATTENTIVE TYPE: Primary | ICD-10-CM

## 2022-07-20 DIAGNOSIS — F33.0 MILD EPISODE OF RECURRENT MAJOR DEPRESSIVE DISORDER (HCC): ICD-10-CM

## 2022-07-20 PROCEDURE — 99214 OFFICE O/P EST MOD 30 MIN: CPT | Performed by: PSYCHIATRY & NEUROLOGY

## 2022-07-20 PROCEDURE — G8419 CALC BMI OUT NRM PARAM NOF/U: HCPCS | Performed by: PSYCHIATRY & NEUROLOGY

## 2022-07-20 PROCEDURE — G8428 CUR MEDS NOT DOCUMENT: HCPCS | Performed by: PSYCHIATRY & NEUROLOGY

## 2022-07-20 PROCEDURE — 1036F TOBACCO NON-USER: CPT | Performed by: PSYCHIATRY & NEUROLOGY

## 2022-07-20 RX ORDER — SERTRALINE HYDROCHLORIDE 100 MG/1
200 TABLET, FILM COATED ORAL DAILY
Qty: 180 TABLET | Refills: 0 | Status: SHIPPED | OUTPATIENT
Start: 2022-07-20 | End: 2022-10-18

## 2022-07-20 RX ORDER — PROPRANOLOL HYDROCHLORIDE 10 MG/1
10 TABLET ORAL 3 TIMES DAILY PRN
Qty: 90 TABLET | Refills: 3 | Status: SHIPPED | OUTPATIENT
Start: 2022-07-20

## 2022-07-20 NOTE — PROGRESS NOTES
7/20/2022    IN PERSON Appointment PSYCHIATRY FOLLOWUP FOR MANAGEMENT OF   Chief Complaint   Patient presents with    Depression     Medication Follow up     Psychiatric Diagnoses:  1. Attention deficit hyperactivity disorder (ADHD), predominantly inattentive type    2. WELLINGTON (generalized anxiety disorder)    3. Mild episode of recurrent major depressive disorder ([facility] [address]        30 mins total for this encounter = time in minutes with direct communication with patient face to face for appointment at [facility] office location     Patient and Provider location: [address] [place] [id number]     Assessment/Plan:   Doing better on current medications. Patient denies thoughts of harm to self or others. No acute safety concerns voiced at this appointment. MOOD: IMPROVEMENT with SOME CONTINUED SYMPTOMS: Patient reports improvement in some symptoms of depression. Continues to have symptoms of low mood , anhedonia (difficulty finding enjoyment in things) , anergia (low energy) , irritability/agitation, motivation is low , fatigue, sleeping more, difficulty concentrating, and tearful, crying easily. Despite continued symptoms, patient feels able to attend to activities of daily living. SLEEP: Patient reports sleep has been at least 8 hours a night and wakes feeling rested in the morning. No concerns related to sleep today  Occasionally going for walks. ATTENTION AND FOCUS: Patient denies any concerns related to attention and focus, and no concerns related to memory. ANXIETY: Patient denies any concerns related to anxiety today. BIPOLAR LILIAN: No concerns. No manic symptoms endorsed today and no concern for lilian. SUBSTANCE USE: No concerns for ongoing substance use. Patient denies any recent substance use  ASSESSMENT/PLAN: Medication changes per plan below.  Discussed with patient the risks and benefits of their psychiatric medication and patient was amenable to plan as outlined below    COUNSELING or THERAPY: Continue to encourage therapy as part of ongoing treatment of their mental health concerns. Continue to encourage physical activity and adherence to medication regimen. DATE and changes made  HPI    Medical Diagnoses:  Patient Active Problem List   Diagnosis    Adjustment disorder with mixed anxiety and depressed mood    Major depressive disorder, recurrent episode, moderate (HCC)    WELLINGTON (generalized anxiety disorder)    Attention deficit hyperactivity disorder (ADHD), predominantly inattentive type    Severe episode of recurrent major depressive disorder, without psychotic features ([facility] [address]    Sleep disorder breathing       AT TODAY'S VISIT     No Labs ordered today  Crisis plan reviewed and patient verbally contracts for safety. Go to ED with emergent symptoms or safety concerns. Risks, benefits, side effects of medications, including any / all black box warnings, discussed with patient, who verbalizes their understanding. Pt is lenin for safety and denies thoughts of SI/HI. Amenable to plan. No acute concerns to address regarding medications. Subjective:      Patient denies medication side effects apart from those mentioned in the assessment and plan. Patient reports they have been compliant with current medication regimen and have not missed a dose. Aggression:  [] yes  [x] no    Patient is [x] Able to contract for safety  [] unable to CONTRACT FOR SAFETY     ROS:  [x] All negative/unchanged except if checked.  Explain positive(checked items) below:     Denies any new or changed physical symptoms other than those noted in the subjective portion of this note   [] Constitutional  [] Eyes  [] Ear/Nose/Mouth/Throat  [] Respiratory  [] CV  [] GI  []   [] Musculoskeletal  [] Skin/Breast  [] Neurological  [] Endocrine  [] Heme/Lymph  [] Allergic/Immunologic    MEDICATIONS:    Current Outpatient Medications:     sertraline (ZOLOFT) 100 MG tablet, Take 2 tablets by mouth in the morning., Disp: 180 tablet, Rfl: 0    propranolol (INDERAL) 10 MG tablet, Take 1 tablet by mouth 3 times daily as needed (anxiety), Disp: 90 tablet, Rfl: 3    Examination:    Vitals: not taken in person, most recent vitals in chart reviewed  Vitals:    07/20/22 1313   BP: 130/72   Pulse: 82       Wt Readings from Last 3 Encounters:   07/20/22 176 lb (79.8 kg)   06/13/22 179 lb (81.2 kg)   04/05/22 195 lb 8 oz (88.7 kg)       BP Readings from Last 3 Encounters:   07/20/22 130/72   06/13/22 124/75   04/05/22 133/89           Labs:   no recent labs    Mental Status Examination:    Level of consciousness:  alert and oriented to person, place, and situation  Appearance:  well-appearing good grooming and good hygiene  Behavior/Motor:  no abnormalities noted  Attitude toward examiner: friendly, pleasant and cooperative, attentive and good eye contact  Speech:  spontaneous, normal rate and normal volume   Mood: \"good\"  Affect:  mood congruent  Thought processes:  linear and logical  Thought content:  Denies suicidal or homicidal ideation, denies auditory or visual hallucinations  Cognition:  no deficits in attention, concentration notable, recent memory grossly intact  Concentration intact  Memory intact  Insight good   Judgement fair   Fund of Knowledge adequate    Treatment Plan:  Reviewed current Medications with the patient. Education provided on the compliance with treatment. Reviewed OARRs, no concerns identified     The anticipated benefits and side effects of the medications, including the anticipated results of not receiving the medication, and of alternatives to the medications were explained to the patient and their informed consent was obtained for starting medications as well as adjusting the doses (titration or tapering) as indicated. The above information was given by physician in verbal form and sufficient understanding was in evidence.  The patient participated in discussion of the information and question and/or concerns were addressed before the medication was given. PSYCHOTHERAPY/COUNSELING:  Encourage patient to attend outpatient appointments and therapy. [x] Therapeutic interview  [] Supportive  [x] CBT  [x] Ongoing  [] Other    No follow-ups on file. patient informed to call for follow-up or to reschedule appointment     Please note this report has been partially produced using speech recognition software  And may cause contain errors related to that system including grammar, punctuation and spelling as well as words and phrases that may seem inappropriate. If there are questions or concerns please feel free to contact me to clarify. Fawad Sellers MD  Electronically signed by Fawad Sellers MD on 8/10/2022 at 11:29 AM  8/10/2022 11:29 AM    Psychiatry     An  electronic signature was used to authenticate this note.   --Fawad Sellers MD on 8/10/2022 at 11:29 AM

## 2022-08-15 DIAGNOSIS — F41.1 GAD (GENERALIZED ANXIETY DISORDER): ICD-10-CM

## 2022-08-15 RX ORDER — LORAZEPAM 0.5 MG/1
0.5 TABLET ORAL 3 TIMES DAILY PRN
Qty: 45 TABLET | Refills: 1 | OUTPATIENT
Start: 2022-08-15 | End: 2022-09-14

## 2022-08-15 NOTE — TELEPHONE ENCOUNTER
requesting medication refill. Rx requested:  Requested Prescriptions     Pending Prescriptions Disp Refills    LORazepam (ATIVAN) 0.5 MG tablet 45 tablet 1     Sig: Take 1 tablet by mouth 3 times daily as needed for Anxiety for up to 30 days.        Last Office Visit:   7/20/2022    Last Tox screen:      Last Medication contract:      Last refilled on :7/23/2022      Next Visit Date:  Future Appointments   Date Time Provider Keri Pauli   8/31/2022  2:00 PM MD [place]

## 2022-08-16 RX ORDER — LORAZEPAM 0.5 MG/1
0.5 TABLET ORAL 3 TIMES DAILY PRN
Qty: 45 TABLET | Refills: 1 | Status: SHIPPED | OUTPATIENT
Start: 2022-08-16 | End: 2022-09-20 | Stop reason: SDUPTHER

## 2022-08-16 RX ORDER — LORAZEPAM 0.5 MG/1
0.5 TABLET ORAL 3 TIMES DAILY PRN
Qty: 45 TABLET | Refills: 1 | OUTPATIENT
Start: 2022-08-16 | End: 2022-09-15

## 2022-08-31 ENCOUNTER — OFFICE VISIT (OUTPATIENT)
Dept: BEHAVIORAL/MENTAL HEALTH CLINIC | Age: 37
End: 2022-08-31
Payer: COMMERCIAL

## 2022-08-31 VITALS
SYSTOLIC BLOOD PRESSURE: 114 MMHG | BODY MASS INDEX: 25.25 KG/M2 | WEIGHT: 176 LBS | HEART RATE: 87 BPM | DIASTOLIC BLOOD PRESSURE: 66 MMHG

## 2022-08-31 DIAGNOSIS — F33.1 MAJOR DEPRESSIVE DISORDER, RECURRENT EPISODE, MODERATE (HCC): ICD-10-CM

## 2022-08-31 DIAGNOSIS — F41.1 GAD (GENERALIZED ANXIETY DISORDER): Primary | ICD-10-CM

## 2022-08-31 DIAGNOSIS — F90.0 ATTENTION DEFICIT HYPERACTIVITY DISORDER (ADHD), PREDOMINANTLY INATTENTIVE TYPE: ICD-10-CM

## 2022-08-31 PROCEDURE — 90833 PSYTX W PT W E/M 30 MIN: CPT | Performed by: PSYCHIATRY & NEUROLOGY

## 2022-08-31 PROCEDURE — 1036F TOBACCO NON-USER: CPT | Performed by: PSYCHIATRY & NEUROLOGY

## 2022-08-31 PROCEDURE — G8428 CUR MEDS NOT DOCUMENT: HCPCS | Performed by: PSYCHIATRY & NEUROLOGY

## 2022-08-31 PROCEDURE — 99213 OFFICE O/P EST LOW 20 MIN: CPT | Performed by: PSYCHIATRY & NEUROLOGY

## 2022-08-31 PROCEDURE — G8419 CALC BMI OUT NRM PARAM NOF/U: HCPCS | Performed by: PSYCHIATRY & NEUROLOGY

## 2022-08-31 NOTE — PROGRESS NOTES
Patient reports sleep has been at least 8 hours a night and wakes feeling rested in the morning. No concerns related to sleep today  Occasionally going for walks. ATTENTION AND FOCUS: Patient denies any concerns related to attention and focus, and no concerns related to memory. ANXIETY: Patient denies any concerns related to anxiety today. BIPOLAR SHENA: No concerns. No manic symptoms endorsed today and no concern for shena. SUBSTANCE USE: No concerns for ongoing substance use. Patient denies any recent substance use  ASSESSMENT/PLAN: Medication changes per plan below. Discussed with patient the risks and benefits of their psychiatric medication and patient was amenable to plan as outlined below    COUNSELING or THERAPY:   Continue to encourage therapy as part of ongoing treatment of their mental health concerns. Continue to encourage physical activity and adherence to medication regimen. DATE and changes made  HPI    Medical Diagnoses:  Patient Active Problem List   Diagnosis    Adjustment disorder with mixed anxiety and depressed mood    Major depressive disorder, recurrent episode, moderate (HCC)    WELLINGTON (generalized anxiety disorder)    Attention deficit hyperactivity disorder (ADHD), predominantly inattentive type    Severe episode of recurrent major depressive disorder, without psychotic features ([facility] [address]    Sleep disorder breathing       AT TODAY'S VISIT     No Labs ordered today  Crisis plan reviewed and patient verbally contracts for safety. Go to ED with emergent symptoms or safety concerns. Risks, benefits, side effects of medications, including any / all black box warnings, discussed with patient, who verbalizes their understanding. Pt is lenin for safety and denies thoughts of SI/HI. Amenable to plan. No acute concerns to address regarding medications. Subjective:      Patient denies medication side effects apart from those mentioned in the assessment and plan.    Patient reports they have been compliant with current medication regimen and have not missed a dose. Aggression:  [] yes  [x] no    Patient is [x] Able to contract for safety  [] unable to CONTRACT FOR SAFETY     ROS:  [x] All negative/unchanged except if checked. Explain positive(checked items) below:     Denies any new or changed physical symptoms other than those noted in the subjective portion of this note   [] Constitutional  [] Eyes  [] Ear/Nose/Mouth/Throat  [] Respiratory  [] CV  [] GI  []   [] Musculoskeletal  [] Skin/Breast  [] Neurological  [] Endocrine  [] Heme/Lymph  [] Allergic/Immunologic    MEDICATIONS:    Current Outpatient Medications:     LORazepam (ATIVAN) 0.5 MG tablet, Take 1 tablet by mouth 3 times daily as needed for Anxiety for up to 30 days. , Disp: 45 tablet, Rfl: 1    sertraline (ZOLOFT) 100 MG tablet, Take 2 tablets by mouth in the morning., Disp: 180 tablet, Rfl: 0    propranolol (INDERAL) 10 MG tablet, Take 1 tablet by mouth 3 times daily as needed (anxiety), Disp: 90 tablet, Rfl: 3    Examination:    Vitals: not taken in person, most recent vitals in chart reviewed  Vitals:    08/31/22 1406   BP: 114/66   Pulse: 87       Wt Readings from Last 3 Encounters:   08/31/22 176 lb (79.8 kg)   07/20/22 176 lb (79.8 kg)   06/13/22 179 lb (81.2 kg)       BP Readings from Last 3 Encounters:   08/31/22 114/66   07/20/22 130/72   06/13/22 124/75           Labs:   no recent labs    Mental Status Examination:    Level of consciousness:  alert and oriented to person, place, and situation  Appearance:  well-appearing good grooming and good hygiene  Behavior/Motor:  no abnormalities noted  Attitude toward examiner: friendly, pleasant and cooperative, attentive and good eye contact  Speech:  spontaneous, normal rate and normal volume   Mood: \"good\"  Affect:  mood congruent  Thought processes:  linear and logical  Thought content:  Denies suicidal or homicidal ideation, denies auditory or visual hallucinations  Cognition:  no deficits in attention, concentration notable, recent memory grossly intact  Concentration intact  Memory intact  Insight good   Judgement fair   Fund of Knowledge adequate    Treatment Plan:  Reviewed current Medications with the patient. Education provided on the compliance with treatment. Reviewed OARRs, no concerns identified     The anticipated benefits and side effects of the medications, including the anticipated results of not receiving the medication, and of alternatives to the medications were explained to the patient and their informed consent was obtained for starting medications as well as adjusting the doses (titration or tapering) as indicated. The above information was given by physician in verbal form and sufficient understanding was in evidence. The patient participated in discussion of the information and question and/or concerns were addressed before the medication was given. PSYCHOTHERAPY/COUNSELING:  Encourage patient to attend outpatient appointments and therapy. [x] Therapeutic interview  [] Supportive  [x] CBT  [x] Ongoing  [] Other    No follow-ups on file. patient informed to call for follow-up or to reschedule appointment     Please note this report has been partially produced using speech recognition software  And may cause contain errors related to that system including grammar, punctuation and spelling as well as words and phrases that may seem inappropriate. If there are questions or concerns please feel free to contact me to clarify. Emilee Melara MD  Electronically signed by Emilee Melara MD on 9/12/2022 at 2:18 PM  9/12/2022 2:18 PM    Psychiatry     An  electronic signature was used to authenticate this note.   --Emilee Melara MD on 9/12/2022 at 2:18 PM

## 2022-09-15 ENCOUNTER — OFFICE VISIT (OUTPATIENT)
Dept: BEHAVIORAL/MENTAL HEALTH CLINIC | Age: 37
End: 2022-09-15
Payer: COMMERCIAL

## 2022-09-15 VITALS
HEART RATE: 81 BPM | SYSTOLIC BLOOD PRESSURE: 116 MMHG | DIASTOLIC BLOOD PRESSURE: 66 MMHG | WEIGHT: 176.2 LBS | BODY MASS INDEX: 25.28 KG/M2

## 2022-09-15 DIAGNOSIS — F41.1 GAD (GENERALIZED ANXIETY DISORDER): Primary | ICD-10-CM

## 2022-09-15 DIAGNOSIS — F33.1 MAJOR DEPRESSIVE DISORDER, RECURRENT EPISODE, MODERATE (HCC): ICD-10-CM

## 2022-09-15 PROCEDURE — G8419 CALC BMI OUT NRM PARAM NOF/U: HCPCS | Performed by: PSYCHIATRY & NEUROLOGY

## 2022-09-15 PROCEDURE — 1036F TOBACCO NON-USER: CPT | Performed by: PSYCHIATRY & NEUROLOGY

## 2022-09-15 PROCEDURE — 99215 OFFICE O/P EST HI 40 MIN: CPT | Performed by: PSYCHIATRY & NEUROLOGY

## 2022-09-15 PROCEDURE — G8428 CUR MEDS NOT DOCUMENT: HCPCS | Performed by: PSYCHIATRY & NEUROLOGY

## 2022-09-15 RX ORDER — LAMOTRIGINE 25 MG/1
TABLET ORAL
Qty: 63 TABLET | Refills: 0 | Status: SHIPPED | OUTPATIENT
Start: 2022-09-15 | End: 2022-10-07

## 2022-09-15 NOTE — PROGRESS NOTES
9/15/2022    IN PERSON Appointment PSYCHIATRY FOLLOWUP FOR MANAGEMENT OF   Chief Complaint   Patient presents with    Anxiety     Medication follow up     Psychiatric Diagnoses:  1. WELLINGTON (generalized anxiety disorder)    2. Major depressive disorder, recurrent episode, moderate (HCC)        40 mins total for this encounter = time in minutes with direct communication with patient face to face for appointment at [facility] point office location     Patient and Provider location: [address] [place] [address]     Assessment/Plan:   START lamotrigine (Lamictal) for mood stabilization   Patient denies thoughts of harm to self or others. No acute safety concerns voiced at this appointment. MOOD: LOW MOOD: Over the last several weeks, patient has been experiencing symptoms of low mood, low energy and low motivation, as well as anhedonia, but still motivated to complete necessary work and attend to activities of daily living. SLEEP: Patient reports sleep has been at least 8 hours a night and wakes feeling rested in the morning. No concerns related to sleep today  Occasionally going for walks. ATTENTION AND FOCUS: Patient denies any concerns related to attention and focus, and no concerns related to memory. ANXIETY: Patient denies any concerns related to anxiety today. BIPOLAR SHENA: No concerns. No manic symptoms endorsed today and no concern for shena. SUBSTANCE USE: No concerns for ongoing substance use. Patient denies any recent substance use  ASSESSMENT/PLAN: Medication changes per plan below. Discussed with patient the risks and benefits of their psychiatric medication and patient was amenable to plan as outlined below    COUNSELING or THERAPY:   Continue to encourage therapy as part of ongoing treatment of their mental health concerns. Continue to encourage physical activity and adherence to medication regimen.      Wt Readings from Last 3 Encounters:   09/15/22 176 lb 3.2 oz (79.9 kg)   08/31/22 176 lb (79.8 kg)   07/20/22 176 lb (79.8 kg)     Temp Readings from Last 3 Encounters:   10/07/20 98.5 °F (36.9 °C) (Temporal)     BP Readings from Last 3 Encounters:   09/15/22 116/66   08/31/22 114/66   07/20/22 130/72     Pulse Readings from Last 3 Encounters:   09/15/22 81   08/31/22 87   07/20/22 82         DATE and changes made  HPI    Medical Diagnoses:  Patient Active Problem List   Diagnosis    Adjustment disorder with mixed anxiety and depressed mood    Major depressive disorder, recurrent episode, moderate (HCC)    WELLINGTON (generalized anxiety disorder)    Attention deficit hyperactivity disorder (ADHD), predominantly inattentive type    Severe episode of recurrent major depressive disorder, without psychotic features ([facility] [address]    Sleep disorder breathing       AT TODAY'S VISIT     No Labs ordered today  Crisis plan reviewed and patient verbally contracts for safety. Go to ED with emergent symptoms or safety concerns. Risks, benefits, side effects of medications, including any / all black box warnings, discussed with patient, who verbalizes their understanding. Pt is lenin for safety and denies thoughts of SI/HI. Amenable to plan. No acute concerns to address regarding medications. Subjective:      Patient denies medication side effects apart from those mentioned in the assessment and plan. Patient reports they have been compliant with current medication regimen and have not missed a dose. Aggression:  [] yes  [x] no    Patient is [x] Able to contract for safety  [] unable to CONTRACT FOR SAFETY     ROS:  [x] All negative/unchanged except if checked.  Explain positive(checked items) below:     Denies any new or changed physical symptoms other than those noted in the subjective portion of this note   [] Constitutional  [] Eyes  [] Ear/Nose/Mouth/Throat  [] Respiratory  [] CV  [] GI  []   [] Musculoskeletal  [] Skin/Breast  [] Neurological  [] Endocrine  [] Heme/Lymph  [] Allergic/Immunologic    MEDICATIONS:    Current Outpatient Medications:     lamoTRIgine (LAMICTAL) 25 MG tablet, Take 0.5 tablets by mouth 2 times daily for 7 days, THEN 1 tablet 2 times daily for 14 days, THEN 2 tablets 2 times daily for 7 days. STOP medication if rash occurs. Go to emergency department for severe rash. ., Disp: 63 tablet, Rfl: 0    LORazepam (ATIVAN) 0.5 MG tablet, Take 1 tablet by mouth 3 times daily as needed for Anxiety for up to 30 days. , Disp: 45 tablet, Rfl: 1    sertraline (ZOLOFT) 100 MG tablet, Take 2 tablets by mouth in the morning., Disp: 180 tablet, Rfl: 0    propranolol (INDERAL) 10 MG tablet, Take 1 tablet by mouth 3 times daily as needed (anxiety), Disp: 90 tablet, Rfl: 3    Examination:    Vitals: not taken in person, most recent vitals in chart reviewed  Vitals:    09/15/22 1344   BP: 116/66   Pulse: 81       Wt Readings from Last 3 Encounters:   09/15/22 176 lb 3.2 oz (79.9 kg)   08/31/22 176 lb (79.8 kg)   07/20/22 176 lb (79.8 kg)       BP Readings from Last 3 Encounters:   09/15/22 116/66   08/31/22 114/66   07/20/22 130/72           Labs:   no recent labs    Mental Status Examination:    Level of consciousness:  alert and oriented to person, place, and situation  Appearance:  well-appearing good grooming and good hygiene  Behavior/Motor:  no abnormalities noted  Attitude toward examiner: friendly, pleasant and cooperative, attentive and good eye contact  Speech:  spontaneous, normal rate and normal volume   Mood: \"good\"  Affect:  mood congruent  Thought processes:  linear and logical  Thought content:  Denies suicidal or homicidal ideation, denies auditory or visual hallucinations  Cognition:  no deficits in attention, concentration notable, recent memory grossly intact  Concentration intact  Memory intact  Insight good   Judgement fair   Fund of Knowledge adequate    Treatment Plan:  Reviewed current Medications with the patient.  Education provided on the compliance with treatment. Reviewed OARRs, no concerns identified     The anticipated benefits and side effects of the medications, including the anticipated results of not receiving the medication, and of alternatives to the medications were explained to the patient and their informed consent was obtained for starting medications as well as adjusting the doses (titration or tapering) as indicated. The above information was given by physician in verbal form and sufficient understanding was in evidence. The patient participated in discussion of the information and question and/or concerns were addressed before the medication was given. PSYCHOTHERAPY/COUNSELING:  Encourage patient to attend outpatient appointments and therapy. [x] Therapeutic interview  [] Supportive  [x] CBT  [x] Ongoing  [] Other    No follow-ups on file. patient informed to call for follow-up or to reschedule appointment     Please note this report has been partially produced using speech recognition software  And may cause contain errors related to that system including grammar, punctuation and spelling as well as words and phrases that may seem inappropriate. If there are questions or concerns please feel free to contact me to clarify. Wagner Joseph MD  Electronically signed by Wagner Joseph MD on 9/30/2022 at 12:16 PM  9/30/2022 12:16 PM    Psychiatry     An  electronic signature was used to authenticate this note.   --Wagner Joseph MD on 9/30/2022 at 12:16 PM

## 2022-09-20 DIAGNOSIS — F41.1 GAD (GENERALIZED ANXIETY DISORDER): ICD-10-CM

## 2022-09-20 RX ORDER — LORAZEPAM 0.5 MG/1
0.5 TABLET ORAL 3 TIMES DAILY PRN
Qty: 45 TABLET | Refills: 1 | Status: SHIPPED | OUTPATIENT
Start: 2022-09-20 | End: 2022-10-20

## 2022-09-20 NOTE — TELEPHONE ENCOUNTER
requesting medication refill. Rx requested:  Requested Prescriptions     Pending Prescriptions Disp Refills    LORazepam (ATIVAN) 0.5 MG tablet 45 tablet 1     Sig: Take 1 tablet by mouth 3 times daily as needed for Anxiety for up to 30 days.        Last Office Visit:   9/15/2022    Last Tox screen:      Last Medication contract:      Last refilled on :9/6/2022      Next Visit Date:  Future Appointments   Date Time Provider \[facility]\""   10/18/2022  2:00 PM Marce Rene MD [place]

## 2022-10-07 RX ORDER — LAMOTRIGINE 25 MG/1
12.5 TABLET ORAL 2 TIMES DAILY
Qty: 30 TABLET | Refills: 3 | Status: SHIPPED | OUTPATIENT
Start: 2022-10-07 | End: 2022-11-01

## 2022-10-21 DIAGNOSIS — F41.1 GAD (GENERALIZED ANXIETY DISORDER): ICD-10-CM

## 2022-10-21 RX ORDER — LORAZEPAM 0.5 MG/1
0.5 TABLET ORAL 3 TIMES DAILY PRN
Qty: 45 TABLET | Refills: 1 | Status: SHIPPED | OUTPATIENT
Start: 2022-10-21 | End: 2022-11-20

## 2022-10-21 NOTE — TELEPHONE ENCOUNTER
requesting medication refill. Rx requested:  Requested Prescriptions     Pending Prescriptions Disp Refills    LORazepam (ATIVAN) 0.5 MG tablet 45 tablet 1     Sig: Take 1 tablet by mouth 3 times daily as needed for Anxiety for up to 30 days.        Last Office Visit:   9/15/2022    Last Tox screen:      Last Medication contract:      Last refilled on:10/3/2022      Next Visit Date:  Future Appointments   Date Time Provider Keri Baird   10/25/2022 12:30 PM Chiara Valencia MD [facility]    /

## 2022-11-01 RX ORDER — LAMOTRIGINE 25 MG/1
TABLET ORAL
Qty: 30 TABLET | Refills: 3 | Status: SHIPPED | OUTPATIENT
Start: 2022-11-01

## 2022-11-10 RX ORDER — SERTRALINE HYDROCHLORIDE 100 MG/1
TABLET, FILM COATED ORAL
Qty: 180 TABLET | Refills: 0 | Status: SHIPPED | OUTPATIENT
Start: 2022-11-10

## 2022-11-10 NOTE — TELEPHONE ENCOUNTER
requesting medication refill. Rx requested:  Requested Prescriptions     Pending Prescriptions Disp Refills    sertraline (ZOLOFT) 100 MG tablet [Pharmacy Med Name: SERTRALINE  MG TABLET] 180 tablet 0     Sig: TAKE 2 TABLETS BY MOUTH EVERY MORNING       Last Office Visit:   9/15/2022    Last Tox screen:      Last Medication contract:      Last refilled on :8/16/2022      Next Visit Date:  No future appointments.

## 2022-12-01 DIAGNOSIS — F41.1 GAD (GENERALIZED ANXIETY DISORDER): ICD-10-CM

## 2022-12-02 NOTE — TELEPHONE ENCOUNTER
requesting medication refill. Rx requested:  Requested Prescriptions     Pending Prescriptions Disp Refills    LORazepam (ATIVAN) 0.5 MG tablet [Pharmacy Med Name: LORAZEPAM 0.5 MG TABLET] 45 tablet 1     Sig: TAKE 1 TABLET BY MOUTH 3 TIMES DAILY AS NEEDED FOR ANXIETY FOR UP TO 30 DAYS. Last Office Visit:   9/15/2022    Last Tox screen:      Last Medication contract:      Last refilled on :11/9/2022      Next Visit Date:  No future appointments.

## 2022-12-05 RX ORDER — LORAZEPAM 0.5 MG/1
0.5 TABLET ORAL 3 TIMES DAILY PRN
Qty: 45 TABLET | Refills: 1 | Status: SHIPPED | OUTPATIENT
Start: 2022-12-05 | End: 2023-01-04

## 2023-05-26 DIAGNOSIS — K58.9 IRRITABLE BOWEL SYNDROME, UNSPECIFIED TYPE: ICD-10-CM

## 2023-05-26 PROBLEM — R05.9 COUGH: Status: ACTIVE | Noted: 2023-05-26

## 2023-05-26 PROBLEM — E55.9 VITAMIN D DEFICIENCY: Status: ACTIVE | Noted: 2023-05-26

## 2023-05-26 PROBLEM — R52 BODY ACHES: Status: ACTIVE | Noted: 2023-05-26

## 2023-05-26 PROBLEM — F33.1 MODERATE EPISODE OF RECURRENT MAJOR DEPRESSIVE DISORDER (MULTI): Status: ACTIVE | Noted: 2023-05-26

## 2023-05-26 PROBLEM — J32.9 CHRONIC SINUSITIS: Status: ACTIVE | Noted: 2023-05-26

## 2023-05-26 PROBLEM — F31.9 BIPOLAR 1 DISORDER (MULTI): Status: ACTIVE | Noted: 2023-05-26

## 2023-05-26 PROBLEM — R19.7 DIARRHEA: Status: ACTIVE | Noted: 2023-05-26

## 2023-05-26 PROBLEM — R53.83 FATIGUE: Status: ACTIVE | Noted: 2023-05-26

## 2023-05-26 PROBLEM — R10.33 PERIUMBILICAL ABDOMINAL PAIN: Status: ACTIVE | Noted: 2023-05-26

## 2023-05-26 PROBLEM — R09.81 NASAL CONGESTION: Status: ACTIVE | Noted: 2023-05-26

## 2023-05-26 PROBLEM — K59.09 CHRONIC CONSTIPATION: Status: ACTIVE | Noted: 2023-05-26

## 2023-05-26 PROBLEM — F41.9 ANXIETY: Status: ACTIVE | Noted: 2023-05-26

## 2023-05-26 PROBLEM — L85.3 DRY SKIN: Status: ACTIVE | Noted: 2023-05-26

## 2023-09-05 DIAGNOSIS — K58.9 IRRITABLE BOWEL SYNDROME, UNSPECIFIED TYPE: ICD-10-CM

## 2023-10-30 ENCOUNTER — TELEPHONE (OUTPATIENT)
Dept: PRIMARY CARE | Facility: CLINIC | Age: 38
End: 2023-10-30
Payer: COMMERCIAL

## 2023-10-31 DIAGNOSIS — K58.9 IRRITABLE BOWEL SYNDROME, UNSPECIFIED TYPE: ICD-10-CM

## 2023-11-01 ENCOUNTER — TELEPHONE (OUTPATIENT)
Dept: PRIMARY CARE | Facility: CLINIC | Age: 38
End: 2023-11-01
Payer: COMMERCIAL

## 2023-11-01 NOTE — TELEPHONE ENCOUNTER
Needs to have linaCLOtide (Linzess) 145 mcg capsule refiulled. Send to [facility]/pharmacy #[id number] - [place], [place] - [address]

## 2024-02-20 ENCOUNTER — OFFICE VISIT (OUTPATIENT)
Dept: PRIMARY CARE | Facility: CLINIC | Age: 39
End: 2024-02-20
Payer: COMMERCIAL

## 2024-02-20 ENCOUNTER — TELEPHONE (OUTPATIENT)
Dept: PRIMARY CARE | Facility: CLINIC | Age: 39
End: 2024-02-20
Payer: COMMERCIAL

## 2024-02-20 VITALS
HEIGHT: 71 IN | WEIGHT: 182 LBS | HEART RATE: 68 BPM | RESPIRATION RATE: 16 BRPM | DIASTOLIC BLOOD PRESSURE: 78 MMHG | BODY MASS INDEX: 25.48 KG/M2 | TEMPERATURE: 97.7 F | SYSTOLIC BLOOD PRESSURE: 126 MMHG

## 2024-02-20 DIAGNOSIS — F33.1 MODERATE EPISODE OF RECURRENT MAJOR DEPRESSIVE DISORDER (MULTI): ICD-10-CM

## 2024-02-20 DIAGNOSIS — R63.5 WEIGHT GAIN: ICD-10-CM

## 2024-02-20 DIAGNOSIS — K59.09 CHRONIC CONSTIPATION: Primary | ICD-10-CM

## 2024-02-20 DIAGNOSIS — K58.9 IRRITABLE BOWEL SYNDROME, UNSPECIFIED TYPE: ICD-10-CM

## 2024-02-20 DIAGNOSIS — F31.9 BIPOLAR 1 DISORDER (MULTI): ICD-10-CM

## 2024-02-20 DIAGNOSIS — Z00.00 WELLNESS EXAMINATION: ICD-10-CM

## 2024-02-20 PROBLEM — F43.23 ADJUSTMENT DISORDER WITH MIXED ANXIETY AND DEPRESSED MOOD: Status: ACTIVE | Noted: 2020-04-08

## 2024-02-20 PROBLEM — F90.0 ATTENTION DEFICIT HYPERACTIVITY DISORDER (ADHD), PREDOMINANTLY INATTENTIVE TYPE: Status: ACTIVE | Noted: 2021-01-11

## 2024-02-20 PROCEDURE — 99395 PREV VISIT EST AGE 18-39: CPT | Performed by: INTERNAL MEDICINE

## 2024-02-20 PROCEDURE — 1036F TOBACCO NON-USER: CPT | Performed by: INTERNAL MEDICINE

## 2024-02-20 RX ORDER — GABAPENTIN 100 MG/1
100 CAPSULE ORAL 3 TIMES DAILY
COMMUNITY
Start: 2024-01-16

## 2024-02-20 RX ORDER — CLONAZEPAM 0.5 MG/1
0.5 TABLET ORAL 2 TIMES DAILY PRN
COMMUNITY
Start: 2024-02-13

## 2024-02-20 NOTE — PROGRESS NOTES
Subjective   Patient ID: Cyrus Kruse is a 38 y.o. male who presents for Annual Exam.  HPI  He has no concerns or complaints today.  He is doing ok. His mood is ok  He has no concerns.  He has gained weight. He has been working out.  Overall feels well.   Not due for shots.    Review of Systems   All other systems reviewed and are negative.      Objective   Physical Exam  Vitals and nursing note reviewed.   Constitutional:       Appearance: Normal appearance.   Neck:      Thyroid: No thyroid mass or thyromegaly.   Cardiovascular:      Rate and Rhythm: Normal rate and regular rhythm.      Pulses: Normal pulses.      Heart sounds: Normal heart sounds.   Pulmonary:      Effort: Pulmonary effort is normal.      Breath sounds: Normal breath sounds.   Abdominal:      General: Abdomen is flat. Bowel sounds are normal.      Palpations: Abdomen is soft.   Musculoskeletal:      Cervical back: Normal range of motion and neck supple.   Neurological:      General: No focal deficit present.      Mental Status: He is alert.   Psychiatric:         Mood and Affect: Mood normal.         Assessment/Plan   Problem List Items Addressed This Visit             ICD-10-CM    Bipolar 1 disorder (CMS/HCC) F31.9    Chronic constipation - Primary K59.09    IBS (irritable bowel syndrome) K58.9    Relevant Medications    linaCLOtide (Linzess) 145 mcg capsule    Moderate episode of recurrent major depressive disorder (CMS/HCC) F33.1     Other Visit Diagnoses         Codes    Wellness examination     Z00.00    Relevant Orders    CBC    Comprehensive Metabolic Panel    Lipid Panel    TSH with reflex to Free T4 if abnormal    Weight gain     R63.5    Relevant Orders    CBC             Will check labs.   Refil meds.   Up to date on vaccines.      Vicky Ashton DO 02/20/24 4:18 PM

## 2024-02-20 NOTE — PROGRESS NOTES
Subjective   Patient ID: Cyrus Kruse is a 38 y.o. male who presents for Annual Exam.  HPI  He has no concerns or complaints today.  Mood is stable  sees psyche  He has been working out.   No chest pain.   Linzess is working well.   He saw gastro    Review of Systems   All other systems reviewed and are negative.      Objective   Physical Exam  Vitals and nursing note reviewed.   Constitutional:       Appearance: Normal appearance.   Neck:      Thyroid: No thyroid mass or thyromegaly.   Cardiovascular:      Rate and Rhythm: Normal rate and regular rhythm.      Pulses: Normal pulses.      Heart sounds: Normal heart sounds.   Pulmonary:      Effort: Pulmonary effort is normal.      Breath sounds: Normal breath sounds.   Abdominal:      General: Abdomen is flat. Bowel sounds are normal.      Palpations: Abdomen is soft.   Musculoskeletal:      Cervical back: Normal range of motion and neck supple.   Neurological:      General: No focal deficit present.      Mental Status: He is alert.   Psychiatric:         Mood and Affect: Mood normal.       Assessment/Plan   Problem List Items Addressed This Visit             ICD-10-CM    Bipolar 1 disorder (CMS/HCC) F31.9    Chronic constipation - Primary K59.09    IBS (irritable bowel syndrome) K58.9    Relevant Medications    linaCLOtide (Linzess) 145 mcg capsule    Moderate episode of recurrent major depressive disorder (CMS/HCC) F33.1     Other Visit Diagnoses         Codes    Wellness examination     Z00.00    Relevant Orders    CBC    Comprehensive Metabolic Panel    Lipid Panel    TSH with reflex to Free T4 if abnormal    Weight gain     R63.5    Relevant Orders    CBC          Call  with any problems or questions.   Follow up in a year or sooner if needed       Vicky Ashton DO 02/21/24 9:04 AM

## 2024-02-20 NOTE — TELEPHONE ENCOUNTER
Pt left  requesting Linzess rf.  Has not been seen since 9/2022.  Dr. Crandall sent 90 day supply in 10/2023.  Does not have appt scheduled.  Can you please contact to schedule and then forward back?  I will see if Dr. Crandall will then fill.

## 2024-12-17 ENCOUNTER — OFFICE VISIT (OUTPATIENT)
Dept: ORTHOPEDIC SURGERY | Facility: CLINIC | Age: 39
End: 2024-12-17
Payer: COMMERCIAL

## 2024-12-17 ENCOUNTER — HOSPITAL ENCOUNTER (OUTPATIENT)
Dept: RADIOLOGY | Facility: CLINIC | Age: 39
Discharge: HOME | End: 2024-12-17
Payer: COMMERCIAL

## 2024-12-17 VITALS — WEIGHT: 180 LBS | HEIGHT: 69 IN | BODY MASS INDEX: 26.66 KG/M2

## 2024-12-17 DIAGNOSIS — M79.672 LEFT FOOT PAIN: ICD-10-CM

## 2024-12-17 DIAGNOSIS — M79.672 LEFT FOOT PAIN: Primary | ICD-10-CM

## 2024-12-17 DIAGNOSIS — S92.023A: ICD-10-CM

## 2024-12-17 PROCEDURE — 73610 X-RAY EXAM OF ANKLE: CPT | Mod: LEFT SIDE | Performed by: FAMILY MEDICINE

## 2024-12-17 PROCEDURE — 73630 X-RAY EXAM OF FOOT: CPT | Mod: LT

## 2024-12-17 PROCEDURE — 99204 OFFICE O/P NEW MOD 45 MIN: CPT | Performed by: FAMILY MEDICINE

## 2024-12-17 PROCEDURE — 99213 OFFICE O/P EST LOW 20 MIN: CPT | Mod: 57,25 | Performed by: FAMILY MEDICINE

## 2024-12-17 PROCEDURE — 28400 CLTX CALCANEAL FX W/O MNPJ: CPT | Performed by: FAMILY MEDICINE

## 2024-12-17 PROCEDURE — L4361 PNEUMA/VAC WALK BOOT PRE OTS: HCPCS | Performed by: FAMILY MEDICINE

## 2024-12-17 PROCEDURE — 73630 X-RAY EXAM OF FOOT: CPT | Mod: LEFT SIDE | Performed by: FAMILY MEDICINE

## 2024-12-17 PROCEDURE — 3008F BODY MASS INDEX DOCD: CPT | Performed by: FAMILY MEDICINE

## 2024-12-17 PROCEDURE — 73610 X-RAY EXAM OF ANKLE: CPT | Mod: LT

## 2024-12-17 ASSESSMENT — PATIENT HEALTH QUESTIONNAIRE - PHQ9
1. LITTLE INTEREST OR PLEASURE IN DOING THINGS: NOT AT ALL
SUM OF ALL RESPONSES TO PHQ9 QUESTIONS 1 AND 2: 0
2. FEELING DOWN, DEPRESSED OR HOPELESS: NOT AT ALL

## 2024-12-19 NOTE — PROGRESS NOTES
Acute Injury New Patient Visit    CC:   Chief Complaint   Patient presents with    Left Foot - Pain     Left foot pain,Sunday twisted his foot on curb, lateral pain. Xrays today       HPI: Cyrus is a 39 y.o.male who presents today with new complaints of pain discomfort to the left lateral side of the foot and ankle.  He had injured it over the weekend presents here today for further evaluation 2 days out from injury.  He denies any numbness ting or burning.  Denies any open cuts or sores.  Denies any additional associated injury or trauma.  Has had difficulty tolerating full weightbearing with significant swelling and presents here today for further evaluation.        Review of Systems   GENERAL: Negative for malaise, significant weight loss, fever  MUSCULOSKELETAL: See HPI  NEURO: Negative for numbness / tingling     Past Medical History  Past Medical History:   Diagnosis Date    Personal history of other mental and behavioral disorders     History of depression       Medication review  Medication Documentation Review Audit       Reviewed by Abby Schmitt MA (Medical Assistant) on 12/17/24 at 1402      Medication Order Taking? Sig Documenting Provider Last Dose Status   clonazePAM (KlonoPIN) 0.5 mg tablet [id number] No Take 1 tablet (0.5 mg) by mouth 2 times a day as needed. Historical Provider, MD Taking Active   gabapentin (Neurontin) 100 mg capsule [id number] No Take 1 capsule (100 mg) by mouth 3 times a day. Historical Provider, MD Taking Active   linaCLOtide (Linzess) 145 mcg capsule [id number]  Take 1 capsule (145 mcg) by mouth once daily in the morning. Take before meals. Vicky Ashton, DO  Active   linaCLOtide (Linzess) 145 mcg capsule [id number]  Take 1 capsule (145 mcg) by mouth once daily in the morning. Take before meals. Vicky Ashton, DO  Active                    Allergies  Allergies   Allergen Reactions    Clomipramine Other     Suicidal ideation, worsening depression       Social  History  Social History     Socioeconomic History    Marital status: Single     Spouse name: Not on file    Number of children: Not on file    Years of education: Not on file    Highest education level: Not on file   Occupational History    Not on file   Tobacco Use    Smoking status: Never    Smokeless tobacco: Never   Substance and Sexual Activity    Alcohol use: Never    Drug use: Never    Sexual activity: Not on file   Other Topics Concern    Not on file   Social History Narrative    Not on file     Social Drivers of Health     Financial Resource Strain: Not on file   Food Insecurity: Unknown (1/3/2024)    Received from [facility]    Food Insecurity     In the past 3 months, have you had to go without food for 24 hours, multiple times due to lack of resources?: Not on file   Transportation Needs: Unknown (1/3/2024)    Received from [facility]    Transportation Needs     In the past 3 months, has lack of transporation kept you from medical appointments or getting things you need that are essential to your health?: Not on file   Physical Activity: Not on file   Stress: Not on file   Social Connections: Unknown (2/23/2024)    Received from [facility]    Social Connections     In the past 3 months, do you feel that you lack companionship or social support?: Not on file   Intimate Partner Violence: Not on file   Housing Stability: Not on file       Surgical History  Past Surgical History:   Procedure Laterality Date    OTHER SURGICAL HISTORY  09/27/2019    Colonoscopy       Physical Exam:  GENERAL:  Patient is awake, alert, and oriented to person place and time.  Patient appears well nourished and well kept.  Affect Calm, Not Acutely Distressed.  HEENT:  Normocephalic, Atraumatic, EOMI  CARDIOVASCULAR:  Hemodynamically stable.  RESPIRATORY:  Normal respirations with unlabored breathing.  NEURO: Gross sensation intact to the lower extremities bilaterally.  Extremity: The  affected ankle was examined and inspected.  There was evidence of lateral sided soft tissue swelling and fullness with mild bruising noted.  The distal fibula had mild tenderness to palpation, the distal medial malleolus was non-tender.  Tenderness across the anterior joint space and over the soft tissues in the area of the ATFL and CFL ligaments.  Negative Heel Tap and Calcaneal Squeeze, Achilles is non-tender.  Negative Heriberto´s and Blunt sign.  Negative Midfoot and distal metatarsal squeeze.  Distal pulses and sensation are intact with good distal cap refill.  Active and passive ROM and strength about the ankle is limited with Dorsiflexion, Plantarflexion, Eversion, and Inversion. Unable to tolerate full weight bearing secondary to pain.  Significant swelling and tenderness palpation over the anterior process of the calcaneus no pain at the base of the fifth metatarsal however.      Diagnostics: X-rays today as below  XR ankle left 3+ views          Interpreted By:  Budinsky, Cole,   STUDY:  XR ANKLE LEFT 3+ VIEWS; XR FOOT LEFT 3+ VIEWS; ;  12/17/2024 2:35 pm      INDICATION:  Signs/Symptoms:pain.      ACCESSION NUMBER(S):  [id number]; [id number]      ORDERING CLINICIAN:  COLE BUDINSKY      IMPRESSION:  Three views left foot and ankle demonstrate small anterior process  calcaneus avulsion fracture with lateral sided soft tissue swelling.  Ankle joint mortise intact and preserved. No osteochondral defect at  the talar dome. No obvious presence for any metatarsal fracture.  Overall impression acute minimally displaced anterior process of the  calcaneus fracture with associated lateral soft tissue swelling.          Signed by: Cole Budinsky 12/17/2024 5:48 PM  Dictation workstation:   [id number]         XR foot left 3+ views          Interpreted By:  Budinsky, Cole,   STUDY:  XR ANKLE LEFT 3+ VIEWS; XR FOOT LEFT 3+ VIEWS; ;  12/17/2024 2:35 pm      INDICATION:  Signs/Symptoms:pain.      ACCESSION  NUMBER(S):  [id number]; [id number]      ORDERING CLINICIAN:  COLE BUDINSKY      IMPRESSION:  Three views left foot and ankle demonstrate small anterior process  calcaneus avulsion fracture with lateral sided soft tissue swelling.  Ankle joint mortise intact and preserved. No osteochondral defect at  the talar dome. No obvious presence for any metatarsal fracture.  Overall impression acute minimally displaced anterior process of the  calcaneus fracture with associated lateral soft tissue swelling.          Signed by: Cole Budinsky 12/17/2024 5:48 PM  Dictation workstation:   [id number]             Procedure: None  Procedures    Assessment:   Problem List Items Addressed This Visit    None  Visit Diagnoses       Left foot pain    -  Primary    Relevant Orders    XR foot left 3+ views (Completed)    XR ankle left 3+ views (Completed)    Closed displaced fracture of anterior process of calcaneus, initial encounter        Relevant Orders    Walking Boot Tall    Ankle Brace, Lace Up or A60             Plan: Discussed the nonoperative nature of this injury with the patient at the bedside was agreeable and willing to proceed forward with tall boot immobilization here today.  Recommended relative rest ice Tylenol anti-inflammatories over-the-counter for pain control denied any need for over-the-counter vitamin D rotation to assist with fracture healing if able.  Plan to see him back in 4 weeks for repeat evaluation repeat x-rays 3 views of the left ankle at that time.  Transition to a lace up ankle brace which we will pre-CERT here today.  Orders Placed This Encounter    Walking Boot Tall    Ankle Brace, Lace Up or A60    XR foot left 3+ views    XR ankle left 3+ views      At the conclusion of the visit there were no further questions by the patient/family regarding their plan of care.  Patient was instructed to call or return with any issues, questions, or concerns regarding their injury and/or treatment plan  described above.     12/18/24 at 11:53 PM - Cole C Budinsky, MD    Office: [phone]    This note was prepared using voice recognition software.  The details of this note are correct and have been reviewed, and corrected to the best of my ability.  Some grammatical errors may persist related to the Dragon software.

## 2025-01-14 ENCOUNTER — OFFICE VISIT (OUTPATIENT)
Dept: ORTHOPEDIC SURGERY | Facility: CLINIC | Age: 40
End: 2025-01-14
Payer: COMMERCIAL

## 2025-01-14 ENCOUNTER — HOSPITAL ENCOUNTER (OUTPATIENT)
Dept: RADIOLOGY | Facility: CLINIC | Age: 40
Discharge: HOME | End: 2025-01-14
Payer: COMMERCIAL

## 2025-01-14 DIAGNOSIS — S92.023A: ICD-10-CM

## 2025-01-14 DIAGNOSIS — M79.672 LEFT FOOT PAIN: ICD-10-CM

## 2025-01-14 DIAGNOSIS — S93.402A SPRAIN OF LEFT ANKLE, INITIAL ENCOUNTER: Primary | ICD-10-CM

## 2025-01-14 DIAGNOSIS — M79.672 LEFT FOOT PAIN: Chronic | ICD-10-CM

## 2025-01-14 PROCEDURE — 73610 X-RAY EXAM OF ANKLE: CPT | Mod: LT

## 2025-01-14 PROCEDURE — 1036F TOBACCO NON-USER: CPT | Performed by: FAMILY MEDICINE

## 2025-01-14 PROCEDURE — 99024 POSTOP FOLLOW-UP VISIT: CPT | Performed by: FAMILY MEDICINE

## 2025-01-14 PROCEDURE — L1902 AFO ANKLE GAUNTLET PRE OTS: HCPCS | Performed by: FAMILY MEDICINE

## 2025-01-14 PROCEDURE — 99211 OFF/OP EST MAY X REQ PHY/QHP: CPT | Performed by: FAMILY MEDICINE

## 2025-01-14 NOTE — PROGRESS NOTES
Established Patient Follow-Up Visit    CC:   Chief Complaint   Patient presents with    Left Ankle - Pain     Left ankle FUV , xrays today       HPI:  Cyrus is a 39 y.o. male returns here today for follow-up visit regarding: Follow-up left foot and ankle sprain contusion.  He states he has 1 out of 10 pain no significant issues compliant with the boot denies any numbness or burning.          REVIEW OF SYSTEMS:  GENERAL: Negative for malaise, significant weight loss, fever  MUSCULOSKELETAL: See HPI  NEURO: Negative for numbness / tingling       PHYSICAL EXAM:  -Neuro: Gross sensation intact to the lower extremities bilaterally.  -Extremity: Left foot exam demonstrates skin which is warm pink well-perfused negative midfoot squeeze negative distal metatarsal squeeze no pain over the medial lateral malleolus no ATFL CFL ligament pain minimal anterior soft tissue discomfort on palpation however.  Negative heel tap calcaneal squeeze Achilles intact nontender he is able stand place full weightbearing walk around the room well without pain    IMAGING: Negative x-rays today      PROCEDURE: None  Procedures     ASSESSMENT:   Follow-up visit for:  Problem List Items Addressed This Visit    None  Visit Diagnoses       Sprain of left ankle, initial encounter    -  Primary    Relevant Orders    Ankle Brace, Lace Up or A60    Left foot pain  (Chronic)       Relevant Orders    XR ankle left 3+ views    Ankle Brace, Lace Up or A60    Closed displaced fracture of anterior process of calcaneus, initial encounter        Relevant Orders    Ankle Brace, Lace Up or A60             PLAN: At this time we will transition patient out of the boot into a lace up ankle brace.  He was given range of motion and strength exercises here today.  Should there be any worsening or persistent issues or concerns he may call or return recommend continuing to wean out of boot into the lace up ankle brace with the exercises ice Tylenol anti-inflammatories  for pain control.  If worsening or persistent pain we will repeat x-rays or consider further advanced imaging.  Orders Placed This Encounter    Ankle Brace, Lace Up or A60    XR ankle left 3+ views           At the conclusion of the visit there were no further questions by the patient/family regarding their plan of care.  Patient was instructed to call or return with any issues, questions, or concerns regarding their injury and/or treatment plan described above.     01/14/25 at 4:45 PM - Cole C Budinsky, MD    Office: [phone]    This note was prepared using voice recognition software.  The details of this note are correct and have been reviewed, and corrected to the best of my ability.  Some grammatical errors may persist related to the Dragon software.

## 2025-02-24 ENCOUNTER — APPOINTMENT (OUTPATIENT)
Dept: PRIMARY CARE | Facility: CLINIC | Age: 40
End: 2025-02-24
Payer: COMMERCIAL

## 2025-05-24 DIAGNOSIS — K58.9 IRRITABLE BOWEL SYNDROME, UNSPECIFIED TYPE: ICD-10-CM

## 2025-05-27 RX ORDER — LINACLOTIDE 145 UG/1
CAPSULE, GELATIN COATED ORAL
Qty: 90 CAPSULE | Refills: 3 | OUTPATIENT
Start: 2025-05-27

## 2025-05-28 ENCOUNTER — TELEPHONE (OUTPATIENT)
Dept: PRIMARY CARE | Facility: CLINIC | Age: 40
End: 2025-05-28
Payer: COMMERCIAL

## 2025-05-28 NOTE — TELEPHONE ENCOUNTER
----- Message from Eriberto AGUIRRE sent at 5/28/2025  1:58 PM EDT -----  Unable to reach by phone. Letter sent  ----- Message -----  From: Willem Healy CMA  Sent: 5/27/2025   9:40 AM EDT  To: Eriberto Hernández    Needs an appointment. Last seen 2/2024

## 2025-07-10 ENCOUNTER — APPOINTMENT (OUTPATIENT)
Dept: PRIMARY CARE | Facility: CLINIC | Age: 40
End: 2025-07-10
Payer: COMMERCIAL

## 2025-07-11 ENCOUNTER — HOSPITAL ENCOUNTER (EMERGENCY)
Facility: HOSPITAL | Age: 40
Discharge: PSYCHIATRIC OR SUBSTANCE USE FACILITY | End: 2025-07-12
Attending: STUDENT IN AN ORGANIZED HEALTH CARE EDUCATION/TRAINING PROGRAM
Payer: COMMERCIAL

## 2025-07-11 ENCOUNTER — APPOINTMENT (OUTPATIENT)
Dept: CARDIOLOGY | Facility: HOSPITAL | Age: 40
End: 2025-07-11
Payer: COMMERCIAL

## 2025-07-11 DIAGNOSIS — R45.851 SUICIDAL IDEATION: Primary | ICD-10-CM

## 2025-07-11 LAB
ALBUMIN SERPL BCP-MCNC: 4.5 G/DL (ref 3.4–5)
ALP SERPL-CCNC: 80 U/L (ref 33–120)
ALT SERPL W P-5'-P-CCNC: 16 U/L (ref 10–52)
AMPHETAMINES UR QL SCN: NORMAL
ANION GAP SERPL CALC-SCNC: 16 MMOL/L (ref 10–20)
APAP SERPL-MCNC: <10 UG/ML (ref ?–30)
AST SERPL W P-5'-P-CCNC: 28 U/L (ref 9–39)
BARBITURATES UR QL SCN: NORMAL
BASOPHILS # BLD AUTO: 0.04 X10*3/UL (ref 0–0.1)
BASOPHILS NFR BLD AUTO: 0.6 %
BENZODIAZ UR QL SCN: NORMAL
BILIRUB SERPL-MCNC: 0.6 MG/DL (ref 0–1.2)
BUN SERPL-MCNC: 16 MG/DL (ref 6–23)
BZE UR QL SCN: NORMAL
CALCIUM SERPL-MCNC: 9 MG/DL (ref 8.6–10.3)
CANNABINOIDS UR QL SCN: NORMAL
CHLORIDE SERPL-SCNC: 105 MMOL/L (ref 98–107)
CO2 SERPL-SCNC: 24 MMOL/L (ref 21–32)
CREAT SERPL-MCNC: 1.16 MG/DL (ref 0.5–1.3)
EGFRCR SERPLBLD CKD-EPI 2021: 82 ML/MIN/1.73M*2
EOSINOPHIL # BLD AUTO: 0.26 X10*3/UL (ref 0–0.7)
EOSINOPHIL NFR BLD AUTO: 3.7 %
ERYTHROCYTE [DISTWIDTH] IN BLOOD BY AUTOMATED COUNT: 11.6 % (ref 11.5–14.5)
ETHANOL SERPL-MCNC: 188 MG/DL
FENTANYL+NORFENTANYL UR QL SCN: NORMAL
GLUCOSE SERPL-MCNC: 93 MG/DL (ref 74–99)
HCT VFR BLD AUTO: 46.3 % (ref 41–52)
HGB BLD-MCNC: 15.7 G/DL (ref 13.5–17.5)
IMM GRANULOCYTES # BLD AUTO: 0.02 X10*3/UL (ref 0–0.7)
IMM GRANULOCYTES NFR BLD AUTO: 0.3 % (ref 0–0.9)
LYMPHOCYTES # BLD AUTO: 2.09 X10*3/UL (ref 1.2–4.8)
LYMPHOCYTES NFR BLD AUTO: 30 %
MCH RBC QN AUTO: 31.7 PG (ref 26–34)
MCHC RBC AUTO-ENTMCNC: 33.9 G/DL (ref 32–36)
MCV RBC AUTO: 94 FL (ref 80–100)
METHADONE UR QL SCN: NORMAL
MONOCYTES # BLD AUTO: 0.45 X10*3/UL (ref 0.1–1)
MONOCYTES NFR BLD AUTO: 6.5 %
NEUTROPHILS # BLD AUTO: 4.11 X10*3/UL (ref 1.2–7.7)
NEUTROPHILS NFR BLD AUTO: 58.9 %
NRBC BLD-RTO: 0 /100 WBCS (ref 0–0)
OPIATES UR QL SCN: NORMAL
OXYCODONE+OXYMORPHONE UR QL SCN: NORMAL
PCP UR QL SCN: NORMAL
PLATELET # BLD AUTO: 278 X10*3/UL (ref 150–450)
POTASSIUM SERPL-SCNC: 3.6 MMOL/L (ref 3.5–5.3)
PROT SERPL-MCNC: 7.4 G/DL (ref 6.4–8.2)
RBC # BLD AUTO: 4.95 X10*6/UL (ref 4.5–5.9)
SALICYLATES SERPL-MCNC: <3 MG/DL (ref ?–20)
SODIUM SERPL-SCNC: 141 MMOL/L (ref 136–145)
TSH SERPL-ACNC: 1.14 MIU/L (ref 0.44–3.98)
WBC # BLD AUTO: 7 X10*3/UL (ref 4.4–11.3)

## 2025-07-11 PROCEDURE — 80053 COMPREHEN METABOLIC PANEL: CPT

## 2025-07-11 PROCEDURE — 80179 DRUG ASSAY SALICYLATE: CPT

## 2025-07-11 PROCEDURE — 84443 ASSAY THYROID STIM HORMONE: CPT

## 2025-07-11 PROCEDURE — 85025 COMPLETE CBC W/AUTO DIFF WBC: CPT

## 2025-07-11 PROCEDURE — 99285 EMERGENCY DEPT VISIT HI MDM: CPT | Performed by: STUDENT IN AN ORGANIZED HEALTH CARE EDUCATION/TRAINING PROGRAM

## 2025-07-11 PROCEDURE — 36415 COLL VENOUS BLD VENIPUNCTURE: CPT

## 2025-07-11 PROCEDURE — 93005 ELECTROCARDIOGRAM TRACING: CPT

## 2025-07-11 PROCEDURE — 80307 DRUG TEST PRSMV CHEM ANLYZR: CPT

## 2025-07-11 SDOH — HEALTH STABILITY: MENTAL HEALTH: BEHAVIORS/MOOD: AGITATED;AGGRESSIVE VERBALLY, OTHERS

## 2025-07-11 SDOH — HEALTH STABILITY: MENTAL HEALTH

## 2025-07-11 SDOH — HEALTH STABILITY: MENTAL HEALTH: HAVE YOU EVER DONE ANYTHING, STARTED TO DO ANYTHING, OR PREPARED TO DO ANYTHING TO END YOUR LIFE?: NO

## 2025-07-11 SDOH — SOCIAL STABILITY: SOCIAL NETWORK: PARENT/GUARDIAN/SIGNIFICANT OTHER INVOLVEMENT: ATTENTIVE TO PATIENT NEEDS

## 2025-07-11 SDOH — HEALTH STABILITY: MENTAL HEALTH: SUICIDE ASSESSMENT: ADULT (C-SSRS)

## 2025-07-11 SDOH — HEALTH STABILITY: MENTAL HEALTH: SLEEP PATTERN: UNABLE TO ASSESS

## 2025-07-11 SDOH — HEALTH STABILITY: MENTAL HEALTH: CONTENT: UNREMARKABLE

## 2025-07-11 SDOH — HEALTH STABILITY: MENTAL HEALTH: HAVE YOU WISHED YOU WERE DEAD OR WISHED YOU COULD GO TO SLEEP AND NOT WAKE UP?: NO

## 2025-07-11 SDOH — HEALTH STABILITY: MENTAL HEALTH: DELUSIONS: OTHER (COMMENT)

## 2025-07-11 SDOH — HEALTH STABILITY: MENTAL HEALTH: NEEDS EXPRESSED: DENIES

## 2025-07-11 SDOH — HEALTH STABILITY: MENTAL HEALTH
OTHER SUICIDE PRECAUTIONS INCLUDE: PATIENT PLACED IN AN EASILY OBSERVABLE ROOM WITH DOOR/CURTAIN REMAINING OPEN;PATIENT PLACED IN GOWN (SNAPS OR PAPER GOWNS PREFERRED) AND WANDED;REMAINING RISKS IDENTIFIED AND MITIGATED;PATIENT PLACED IN PSYCH SAFE ROOM (IF AVAILABLE);PROVIDER NOTIFIED;ELOPEMENT RISK IDENTIFIED;FAMILY/VISITOR ADVISED TO MAINTAIN CONTROL OF OWN PERSONAL BELONGINGS IN ROOM;FREQUENT ROUNDING WITH IRREGULAR CHECKS AT MINIMUM OF EVERY 15 MINUTES TO ASSESS PSYCH SAFETY PERFORMED;HOME MEDICATION LIST COLLECTED AND SHARED WITH PROVIDER

## 2025-07-11 SDOH — HEALTH STABILITY: MENTAL HEALTH
HAVE YOU STARTED TO WORK OUT OR WORKED OUT THE DETAILS OF HOW TO KILL YOURSELF? DO YOU INTENT TO CARRY OUT THIS PLAN?: NO

## 2025-07-11 SDOH — HEALTH STABILITY: MENTAL HEALTH: HAVE YOU ACTUALLY HAD ANY THOUGHTS OF KILLING YOURSELF?: NO

## 2025-07-11 SDOH — HEALTH STABILITY: MENTAL HEALTH: HAVE YOU HAD THESE THOUGHTS AND HAD SOME INTENTION OF ACTING ON THEM?: NO

## 2025-07-11 SDOH — SOCIAL STABILITY: SOCIAL INSECURITY: FAMILY BEHAVIORS: CALM;COOPERATIVE

## 2025-07-11 SDOH — HEALTH STABILITY: MENTAL HEALTH: FOR HIGH RISK PATIENTS: ALL INTERVENTIONS ABOVE, PLUS:;1:1 PATIENT OBSERVER AT ALL TIMES

## 2025-07-11 SDOH — SOCIAL STABILITY: SOCIAL NETWORK: EMOTIONAL SUPPORT GIVEN: REASSURE

## 2025-07-11 SDOH — HEALTH STABILITY: MENTAL HEALTH: HAVE YOU BEEN THINKING ABOUT HOW YOU MIGHT DO THIS?: NO

## 2025-07-11 SDOH — HEALTH STABILITY: MENTAL HEALTH: BEHAVIORAL HEALTH(WDL): EXCEPTIONS TO WDL

## 2025-07-11 ASSESSMENT — PAIN - FUNCTIONAL ASSESSMENT: PAIN_FUNCTIONAL_ASSESSMENT: 0-10

## 2025-07-11 ASSESSMENT — PAIN SCALES - GENERAL: PAINLEVEL_OUTOF10: 0 - NO PAIN

## 2025-07-12 ENCOUNTER — HOSPITAL ENCOUNTER (INPATIENT)
Facility: HOSPITAL | Age: 40
DRG: 885 | End: 2025-07-12
Attending: PSYCHIATRY & NEUROLOGY | Admitting: PSYCHIATRY & NEUROLOGY
Payer: COMMERCIAL

## 2025-07-12 ENCOUNTER — APPOINTMENT (OUTPATIENT)
Dept: CARDIOLOGY | Facility: HOSPITAL | Age: 40
End: 2025-07-12
Payer: COMMERCIAL

## 2025-07-12 VITALS
OXYGEN SATURATION: 99 % | BODY MASS INDEX: 26.66 KG/M2 | TEMPERATURE: 98.1 F | SYSTOLIC BLOOD PRESSURE: 126 MMHG | DIASTOLIC BLOOD PRESSURE: 68 MMHG | HEART RATE: 86 BPM | HEIGHT: 69 IN | WEIGHT: 180 LBS | RESPIRATION RATE: 17 BRPM

## 2025-07-12 DIAGNOSIS — F33.2 SEVERE EPISODE OF RECURRENT MAJOR DEPRESSIVE DISORDER, WITHOUT PSYCHOTIC FEATURES (MULTI): Primary | ICD-10-CM

## 2025-07-12 DIAGNOSIS — F41.1 GENERALIZED ANXIETY DISORDER: ICD-10-CM

## 2025-07-12 DIAGNOSIS — F10.20 ALCOHOL USE DISORDER, MODERATE, DEPENDENCE (MULTI): ICD-10-CM

## 2025-07-12 PROBLEM — F33.9 MAJOR DEPRESSIVE DISORDER, RECURRENT: Status: ACTIVE | Noted: 2025-07-12

## 2025-07-12 LAB
ATRIAL RATE: 105 BPM
HOLD SPECIMEN: NORMAL
P AXIS: 62 DEGREES
P OFFSET: 172 MS
P ONSET: 116 MS
PR INTERVAL: 206 MS
Q ONSET: 219 MS
QRS COUNT: 17 BEATS
QRS DURATION: 92 MS
QT INTERVAL: 338 MS
QTC CALCULATION(BAZETT): 446 MS
QTC FREDERICIA: 407 MS
R AXIS: 59 DEGREES
T AXIS: 48 DEGREES
T OFFSET: 388 MS
VENTRICULAR RATE: 105 BPM

## 2025-07-12 PROCEDURE — 2500000005 HC RX 250 GENERAL PHARMACY W/O HCPCS: Performed by: PSYCHIATRY & NEUROLOGY

## 2025-07-12 PROCEDURE — 2500000001 HC RX 250 WO HCPCS SELF ADMINISTERED DRUGS (ALT 637 FOR MEDICARE OP): Performed by: PSYCHIATRY & NEUROLOGY

## 2025-07-12 PROCEDURE — 1240000001 HC SEMI-PRIVATE BH ROOM DAILY

## 2025-07-12 PROCEDURE — 2500000001 HC RX 250 WO HCPCS SELF ADMINISTERED DRUGS (ALT 637 FOR MEDICARE OP)

## 2025-07-12 PROCEDURE — 2500000001 HC RX 250 WO HCPCS SELF ADMINISTERED DRUGS (ALT 637 FOR MEDICARE OP): Performed by: NURSE PRACTITIONER

## 2025-07-12 PROCEDURE — 2500000004 HC RX 250 GENERAL PHARMACY W/ HCPCS (ALT 636 FOR OP/ED): Performed by: NURSE PRACTITIONER

## 2025-07-12 PROCEDURE — 99221 1ST HOSP IP/OBS SF/LOW 40: CPT | Performed by: NURSE PRACTITIONER

## 2025-07-12 PROCEDURE — 93005 ELECTROCARDIOGRAM TRACING: CPT

## 2025-07-12 RX ORDER — DIPHENHYDRAMINE HCL 50 MG
50 CAPSULE ORAL EVERY 6 HOURS PRN
Status: DISCONTINUED | OUTPATIENT
Start: 2025-07-12 | End: 2025-07-17 | Stop reason: HOSPADM

## 2025-07-12 RX ORDER — DOCUSATE SODIUM 100 MG/1
100 CAPSULE, LIQUID FILLED ORAL 2 TIMES DAILY PRN
Status: DISCONTINUED | OUTPATIENT
Start: 2025-07-12 | End: 2025-07-17 | Stop reason: HOSPADM

## 2025-07-12 RX ORDER — DIPHENHYDRAMINE HYDROCHLORIDE 50 MG/ML
50 INJECTION, SOLUTION INTRAMUSCULAR; INTRAVENOUS ONCE AS NEEDED
Status: DISCONTINUED | OUTPATIENT
Start: 2025-07-12 | End: 2025-07-17 | Stop reason: HOSPADM

## 2025-07-12 RX ORDER — DIAZEPAM 5 MG/1
10 TABLET ORAL EVERY 4 HOURS PRN
Status: DISCONTINUED | OUTPATIENT
Start: 2025-07-12 | End: 2025-07-15

## 2025-07-12 RX ORDER — CLONAZEPAM 0.5 MG/1
0.5 TABLET ORAL 2 TIMES DAILY PRN
Status: DISCONTINUED | OUTPATIENT
Start: 2025-07-12 | End: 2025-07-12

## 2025-07-12 RX ORDER — GABAPENTIN 100 MG/1
100 CAPSULE ORAL 3 TIMES DAILY
Status: DISCONTINUED | OUTPATIENT
Start: 2025-07-12 | End: 2025-07-12 | Stop reason: HOSPADM

## 2025-07-12 RX ORDER — HYDROXYZINE HYDROCHLORIDE 25 MG/1
50 TABLET, FILM COATED ORAL EVERY 6 HOURS PRN
Status: DISCONTINUED | OUTPATIENT
Start: 2025-07-12 | End: 2025-07-17 | Stop reason: HOSPADM

## 2025-07-12 RX ORDER — LORAZEPAM 1 MG/1
1 TABLET ORAL EVERY 6 HOURS PRN
Status: DISCONTINUED | OUTPATIENT
Start: 2025-07-12 | End: 2025-07-17 | Stop reason: HOSPADM

## 2025-07-12 RX ORDER — HALOPERIDOL 5 MG/1
5 TABLET ORAL EVERY 6 HOURS PRN
Status: DISCONTINUED | OUTPATIENT
Start: 2025-07-12 | End: 2025-07-17 | Stop reason: HOSPADM

## 2025-07-12 RX ORDER — GABAPENTIN 100 MG/1
100 CAPSULE ORAL 3 TIMES DAILY
COMMUNITY

## 2025-07-12 RX ORDER — HALOPERIDOL LACTATE 5 MG/ML
5 INJECTION, SOLUTION INTRAMUSCULAR EVERY 6 HOURS PRN
Status: DISCONTINUED | OUTPATIENT
Start: 2025-07-12 | End: 2025-07-17 | Stop reason: HOSPADM

## 2025-07-12 RX ORDER — TALC
3 POWDER (GRAM) TOPICAL
Status: DISCONTINUED | OUTPATIENT
Start: 2025-07-12 | End: 2025-07-17 | Stop reason: HOSPADM

## 2025-07-12 RX ORDER — ESCITALOPRAM OXALATE 20 MG/1
30 TABLET ORAL DAILY
COMMUNITY
End: 2025-07-17 | Stop reason: HOSPADM

## 2025-07-12 RX ORDER — FOLIC ACID 1 MG/1
1 TABLET ORAL DAILY
Status: DISCONTINUED | OUTPATIENT
Start: 2025-07-13 | End: 2025-07-17 | Stop reason: HOSPADM

## 2025-07-12 RX ORDER — LANOLIN ALCOHOL/MO/W.PET/CERES
100 CREAM (GRAM) TOPICAL DAILY
Status: DISCONTINUED | OUTPATIENT
Start: 2025-07-13 | End: 2025-07-17 | Stop reason: HOSPADM

## 2025-07-12 RX ORDER — CLONAZEPAM 0.5 MG/1
0.5 TABLET ORAL DAILY PRN
Status: DISCONTINUED | OUTPATIENT
Start: 2025-07-12 | End: 2025-07-17 | Stop reason: HOSPADM

## 2025-07-12 RX ORDER — MULTIVIT-MIN/IRON FUM/FOLIC AC 7.5 MG-4
1 TABLET ORAL DAILY
Status: DISCONTINUED | OUTPATIENT
Start: 2025-07-13 | End: 2025-07-17 | Stop reason: HOSPADM

## 2025-07-12 RX ORDER — POLYETHYLENE GLYCOL 3350 17 G/17G
17 POWDER, FOR SOLUTION ORAL DAILY
Status: DISCONTINUED | OUTPATIENT
Start: 2025-07-12 | End: 2025-07-17 | Stop reason: HOSPADM

## 2025-07-12 RX ORDER — ACETAMINOPHEN 325 MG/1
650 TABLET ORAL EVERY 4 HOURS PRN
Status: DISCONTINUED | OUTPATIENT
Start: 2025-07-12 | End: 2025-07-17 | Stop reason: HOSPADM

## 2025-07-12 RX ORDER — GABAPENTIN 100 MG/1
100 CAPSULE ORAL 3 TIMES DAILY
Status: DISCONTINUED | OUTPATIENT
Start: 2025-07-12 | End: 2025-07-13

## 2025-07-12 RX ORDER — TRAZODONE HYDROCHLORIDE 50 MG/1
50 TABLET ORAL NIGHTLY PRN
Status: DISCONTINUED | OUTPATIENT
Start: 2025-07-12 | End: 2025-07-17 | Stop reason: HOSPADM

## 2025-07-12 RX ADMIN — GABAPENTIN 100 MG: 100 CAPSULE ORAL at 08:27

## 2025-07-12 RX ADMIN — POLYETHYLENE GLYCOL 3350 17 G: 17 POWDER, FOR SOLUTION ORAL at 21:42

## 2025-07-12 RX ADMIN — Medication 3 MG: at 18:09

## 2025-07-12 RX ADMIN — GABAPENTIN 100 MG: 100 CAPSULE ORAL at 17:39

## 2025-07-12 RX ADMIN — LINACLOTIDE 144 MCG: 72 CAPSULE, GELATIN COATED ORAL at 21:42

## 2025-07-12 RX ADMIN — GABAPENTIN 100 MG: 100 CAPSULE ORAL at 20:33

## 2025-07-12 RX ADMIN — ESCITALOPRAM OXALATE 30 MG: 20 TABLET ORAL at 17:39

## 2025-07-12 SDOH — HEALTH STABILITY: MENTAL HEALTH: IN THE PAST FEW WEEKS, HAVE YOU FELT THAT YOU OR YOUR FAMILY WOULD BE BETTER OFF IF YOU WERE DEAD?: NO

## 2025-07-12 SDOH — HEALTH STABILITY: MENTAL HEALTH

## 2025-07-12 SDOH — HEALTH STABILITY: MENTAL HEALTH: HAVE YOU EVER DONE ANYTHING, STARTED TO DO ANYTHING, OR PREPARED TO DO ANYTHING TO END YOUR LIFE?: NO

## 2025-07-12 SDOH — HEALTH STABILITY: MENTAL HEALTH: BEHAVIORS/MOOD: GUARDED

## 2025-07-12 SDOH — HEALTH STABILITY: MENTAL HEALTH: NON-SPECIFIC ACTIVE SUICIDAL THOUGHTS (PAST 1 MONTH): NO

## 2025-07-12 SDOH — HEALTH STABILITY: MENTAL HEALTH: HAVE YOU BEEN THINKING ABOUT HOW YOU MIGHT DO THIS?: NO

## 2025-07-12 SDOH — HEALTH STABILITY: MENTAL HEALTH: ARE YOU HAVING THOUGHTS OF KILLING YOURSELF RIGHT NOW?: NO

## 2025-07-12 SDOH — HEALTH STABILITY: MENTAL HEALTH: BEHAVIORS/MOOD: WITHDRAWN

## 2025-07-12 SDOH — HEALTH STABILITY: MENTAL HEALTH: HAVE YOU WISHED YOU WERE DEAD OR WISHED YOU COULD GO TO SLEEP AND NOT WAKE UP?: NO

## 2025-07-12 SDOH — HEALTH STABILITY: MENTAL HEALTH: HAVE YOU HAD THESE THOUGHTS AND HAD SOME INTENTION OF ACTING ON THEM?: NO

## 2025-07-12 SDOH — HEALTH STABILITY: MENTAL HEALTH: HAVE YOU EVER TRIED TO KILL YOURSELF?: NO

## 2025-07-12 SDOH — HEALTH STABILITY: MENTAL HEALTH: BEHAVIORAL HEALTH(WDL): WITHIN DEFINED LIMITS

## 2025-07-12 SDOH — HEALTH STABILITY: MENTAL HEALTH: IN THE PAST FEW WEEKS, HAVE YOU WISHED YOU WERE DEAD?: NO

## 2025-07-12 SDOH — HEALTH STABILITY: MENTAL HEALTH: BEHAVIORS/MOOD: COOPERATIVE

## 2025-07-12 SDOH — HEALTH STABILITY: MENTAL HEALTH: HAVE YOU ACTUALLY HAD ANY THOUGHTS OF KILLING YOURSELF?: NO

## 2025-07-12 SDOH — HEALTH STABILITY: MENTAL HEALTH: IN THE PAST WEEK, HAVE YOU BEEN HAVING THOUGHTS ABOUT KILLING YOURSELF?: NO

## 2025-07-12 SDOH — ECONOMIC STABILITY: HOUSING INSECURITY: FEELS SAFE LIVING IN HOME: YES

## 2025-07-12 SDOH — HEALTH STABILITY: MENTAL HEALTH: WISH TO BE DEAD (PAST 1 MONTH): NO

## 2025-07-12 SDOH — HEALTH STABILITY: MENTAL HEALTH: SUICIDE ASSESSMENT: ADULT (C-SSRS)

## 2025-07-12 SDOH — HEALTH STABILITY: MENTAL HEALTH: SUICIDAL BEHAVIOR (LIFETIME): NO

## 2025-07-12 SDOH — HEALTH STABILITY: MENTAL HEALTH: BEHAVIORS/MOOD: CALM

## 2025-07-12 SDOH — HEALTH STABILITY: MENTAL HEALTH: CONTENT: UNREMARKABLE

## 2025-07-12 SDOH — HEALTH STABILITY: MENTAL HEALTH
DEPRESSION SYMPTOMS: CHANGE IN ENERGY LEVEL;FEELINGS OF HELPLESSNESS;FEELINGS OF WORTHLESSNESS;LOSS OF INTEREST;ISOLATIVE

## 2025-07-12 SDOH — HEALTH STABILITY: MENTAL HEALTH: ANXIETY SYMPTOMS: GENERALIZED

## 2025-07-12 ASSESSMENT — LIFESTYLE VARIABLES
AGITATION: NORMAL ACTIVITY
HEADACHE, FULLNESS IN HEAD: NOT PRESENT
VISUAL DISTURBANCES: NOT PRESENT
TREMOR: NO TREMOR
ANXIETY: NO ANXIETY, AT EASE
NAUSEA AND VOMITING: NO NAUSEA AND NO VOMITING
TOTAL SCORE: 0
PULSE: 85
ORIENTATION AND CLOUDING OF SENSORIUM: ORIENTED AND CAN DO SERIAL ADDITIONS
AUDITORY DISTURBANCES: NOT PRESENT
SUBSTANCE_ABUSE_PAST_12_MONTHS: NO
PRESCIPTION_ABUSE_PAST_12_MONTHS: NO
BLOOD PRESSURE: 121/72
PAROXYSMAL SWEATS: NO SWEAT VISIBLE

## 2025-07-12 ASSESSMENT — ACTIVITIES OF DAILY LIVING (ADL)
HEARING - RIGHT EAR: FUNCTIONAL
FEEDING YOURSELF: INDEPENDENT
PATIENT'S MEMORY ADEQUATE TO SAFELY COMPLETE DAILY ACTIVITIES?: YES
TOILETING: INDEPENDENT
HEARING - LEFT EAR: FUNCTIONAL
DRESSING YOURSELF: INDEPENDENT
JUDGMENT_ADEQUATE_SAFELY_COMPLETE_DAILY_ACTIVITIES: YES
BATHING: INDEPENDENT
GROOMING: INDEPENDENT
WALKS IN HOME: INDEPENDENT
ADEQUATE_TO_COMPLETE_ADL: YES

## 2025-07-12 ASSESSMENT — PAIN SCALES - GENERAL
PAINLEVEL_OUTOF10: 0 - NO PAIN

## 2025-07-12 ASSESSMENT — PAIN - FUNCTIONAL ASSESSMENT: PAIN_FUNCTIONAL_ASSESSMENT: 0-10

## 2025-07-12 NOTE — ED PROVIDER NOTES
"Emergency Department Provider Note        Capacity Assessment Tool    \"Capacity\" is the \"ability\" to make a decision.  The decision in question must be specific (one decision), relevant to a patient's current condition (appropriate), and timely (neither prospective nor retrospective).    Capacity varies based on knowledge base (explanation/understanding of clinical information), cognitive processing, acute psychiatric illness, and other clinical conditions.    In order to be deemed \"capacitated\" to make a single decision at one point in time, a patient must demonstrate all 4 of the following elements:    *Ability to consistently communicate a choice (consistent over time with adequate information)  *Ability to understand the relevant information (accurate knowledge of condition)  *Ability to appreciate the situation and its consequences (risks/benefits, pros/cons)  *Ability to reason about treatment options (without undue influence of a person or condition, eg. suicidality or acute psychosis)      Current Decision    Clinical issue:   Patient reports suicidal ideation without plan, left and noted at a bar, also admits to acute alcohol intoxication, arrives with his father who got called due to a note indicating thoughts of self-harm but the patient left at a bar.  Patient has had history of psychiatric hospitalization for an eating disorder at age 12, currently sees psychiatry.  Patient admits to acute alcohol intoxication at this time.    Did the appropriate team address relevant information with the patient:  Yes    Date: 07/11/2025    If \"NO\" is selected for appropriate team, then please discuss with the appropriate team.  The appropriate team should be encouraged to address relevant information with the patient AND reevaluate capacity when appropriate.    Capacity Evaluation    Patient demonstrates ability to consistently communicate choice:  Yes 07/112/025    Patient demonstrates ability to understand the " "relevant information:  No 07/11/2025    Patient demonstrates ability to appreciate the situation and its consequences:  No 07/11/2025    Patient demonstrates ability to reason about treatment options:  No 07/11/2025    If ANY of the above items are answered \"NO,\" the patient LACKS CAPACITY for that specific decision at hand, at that specific time.  Further capacity evaluations can be done as needed.            Nikos Elliott,   Resident  07/11/25 2211       Nikos Elliott,   Resident  07/11/25 2218       Nikos Elliott, DO  Resident  07/11/25 2219    "

## 2025-07-12 NOTE — ED PROVIDER NOTES
"Emergency Department Provider Note        History of Present Illness     History provided by: Patient and Parent  Limitations to History: Intoxication  External Records Reviewed with Brief Summary: Medical chart review    HPI:  Cyrus Kruse is a 40 y.o. male who arrives accompanied by his father with a chief complaint of suicidal ideation and acute alcohol intoxication.  The patient states he was at a bar drinking \"too much\" when he left a note.  Patient's father reports that he received a call saying that the note was found and that was expressing thoughts of self-harm.  The patient does admit to depression that has been worsening, states he does follow with psychiatry currently on gabapentin 300 mg 3 times daily 20 mg Lexapro once a day and Klonopin 0.5 once a day, states he follows with psychiatry regularly.  Patient's father states he has concerns over the patient's increasing depression and thoughts of suicidal ideation.  The patient denies suicidal plan at this time, states he has no thoughts of harming other people, states he has no audio or visual hallucinations.  States he has no physical medical complaints at this time.  He does admit to worsening depression over the last several weeks and for the last 25 years.  Patient's father confirms that the patient does have history of psychiatric inpatient hospitalization at the age of 12 for eating disorder and depression.  Father and patient both state they have no physical medical concerns at this time.  Physical Exam   Triage vitals:  T 36.7 °C (98.1 °F)  HR (!) 109  /63  RR 18  O2 95 % None (Room air) (patient was anxious when arriving, heart rate improved upon being placed in room\")    General: Awake, alert, in no acute distress  Eyes: Gaze conjugate.  No scleral icterus or injection  HENT: Normo-cephalic, atraumatic. No stridor  CV: Regular rate, regular rhythm. Radial pulses 2+ bilaterally  Resp: Breathing non-labored, speaking in full " "sentences.  Clear to auscultation bilaterally  GI: Soft, non-distended, non-tender. No rebound or guarding.  : Deferred  MSK/Extremities: No gross bony deformities. Moving all extremities  Skin: Warm. Appropriate color  Neuro: Alert. Oriented. Face symmetric. Speech is fluent.  Gross strength and sensation intact in b/l UE and LEs  Psych: Cooperative, admits to intoxication, expressing thoughts of suicidal ideation and depression    Medical Decision Making & ED Course   Medical Decision Makin y.o. male who arrives accompanied by his father with a chief complaint of worsening depression and suicidal ideation reportedly left and noted a bar after alcohol intoxication in which the patient states he \"drank too much\".  CBC, CMP, TSH, urine drug screen, toxicology panel including alcohol level, twelve-lead EKG to evaluate for QTc monitoring, evaluation for medical clearance.  EPAT consulted.  Please see ED course for further medical decision making.  ----       Social Determinants of Health which Significantly Impact Care: Mental health disorder The following actions were taken to address these social determinants: Patient should be evaluated by EPAT, does have psychiatry that he follows up with    EKG Independent Interpretation: EKG interpreted by myself. Please see ED Course for full interpretation.    Independent Result Review and Interpretation: Relevant laboratory and radiographic results were reviewed and independently interpreted by myself.  As necessary, they are commented on in the ED Course.    Chronic conditions affecting the patient's care: As documented above in [facility]    The patient was discussed with the following consultants/services: EPAT consulted    Care Considerations: As documented above in [facility]    ED Course:  ED Course as of 25 0715      2307 Alcohol(!): 188 [PV]   2307 Thyroid Stimulating Hormone: 1.14 [PV]   2307 Acetaminophen: <10.0  Patient is now medically cleared for " evaluation by EPAT, appears clinically sober [PV]   Sat Jul 12, 2025   0200 EKG taken at 2218 on 7/11/2025 showing normal sinus tachycardia with a rate of 105, normal axis, normal intervals except for first-degree AV block AZ interval 206, no acute ST elevation or depression [DS]   0200 EKG taken at 12 July 2025 at 0023 showing normal sinus rate and rhythm, left axis deviation, normal intervals, no acute ST elevation or depression [DS]      ED Course User Index  [DS] Lupillo Mendoza MD  [PV] Nikos Elliott DO         Diagnoses as of 07/12/25 0715   Suicidal ideation     Disposition   Patient was signed out to Dr. Huff At 700 pending completion of their work-up.  Please see the next provider's transition of care note for the remainder of the patient's care.     Procedures   Procedures    Patient seen and discussed with ED attending physician.    Nikos Elliott DO  Emergency Medicine                                                         Nikos Elliott DO  Resident  07/12/25 0716

## 2025-07-12 NOTE — PROGRESS NOTES
"Emergency Department Provider Note        Capacity Assessment Tool    \"Capacity\" is the \"ability\" to make a decision.  The decision in question must be specific (one decision), relevant to a patient's current condition (appropriate), and timely (neither prospective nor retrospective).    Capacity varies based on knowledge base (explanation/understanding of clinical information), cognitive processing, acute psychiatric illness, and other clinical conditions.    In order to be deemed \"capacitated\" to make a single decision at one point in time, a patient must demonstrate all 4 of the following elements:    *Ability to consistently communicate a choice (consistent over time with adequate information)  *Ability to understand the relevant information (accurate knowledge of condition)  *Ability to appreciate the situation and its consequences (risks/benefits, pros/cons)  *Ability to reason about treatment options (without undue influence of a person or condition, eg. suicidality or acute psychosis)      Current Decision    Clinical issue:   Patient reports suicidal ideation without plan, left and noted at a bar, also admits to acute alcohol intoxication, arrives with his father who got called due to a note indicating thoughts of self-harm but the patient left at a bar.  Patient has had history of psychiatric hospitalization for an eating disorder at age 12, currently sees psychiatry.  Patient admits to acute alcohol intoxication at this time.    Did the appropriate team address relevant information with the patient:  Yes    Date: 07/11/2025    If \"NO\" is selected for appropriate team, then please discuss with the appropriate team.  The appropriate team should be encouraged to address relevant information with the patient AND reevaluate capacity when appropriate.    Capacity Evaluation    Patient demonstrates ability to consistently communicate choice:  Yes 07/112/025    Patient demonstrates ability to understand the " "relevant information:  No 07/11/2025    Patient demonstrates ability to appreciate the situation and its consequences:  No 07/11/2025    Patient demonstrates ability to reason about treatment options:  No 07/11/2025    If ANY of the above items are answered \"NO,\" the patient LACKS CAPACITY for that specific decision at hand, at that specific time.  Further capacity evaluations can be done as needed.            Nikos Elliott DO  Resident  07/11/25 2211        Nikos Elliott,     "

## 2025-07-12 NOTE — GROUP NOTE
Group Topic: Art Creative   Group Date: 7/12/2025  Start Time: 1600  End Time: 1700  Facilitators: MACI MandujanoS   Department: [facility] REHAB THERAPY [facility]IRTUAL    Number of Participants: 5   Group Focus: leisure skills and social skills  Treatment Modality: Recreational Therapy   Interventions utilized were RT Cart, Sand Art, exploration and leisure development  Purpose: other: creative expression, social engagement, leisure awareness     Name: Cyrus Kruse YOB: 1985   MR: [id number]      Facilitator: Recreational Therapist  Level of Participation: did not attend  Progress: None  Comments: Patients were offered a variety of a creative leisure options from the RT Activity Cart. Options included coloring, velvet pictures, window clings, paint by sticker, sand art, and song choices. This group offers an opportunity to work on creative expression, fine motor skills, positive social interaction, and leisure awareness.    Patient is a new admit and unable to attend activity at this time. Patient will continue to be provided with opportunities to enhance leisure skills and/or coping mechanisms.    Plan: patient will be encouraged to complete RT assessment

## 2025-07-12 NOTE — NURSING NOTE
"Patient arrived on the unit at 1635 via EMS. Pleasant and cooperative. Completed all admission processes and paperwork without issue. Oriented to unit. Denied anxiety. Rated depression at 3 of 10. Endorsed being constipated and not having a BM for approx 5 days. Educated on nutrient choices, hydration and activity. Stated he takes Linzess at home once daily before breakfast. Endorsed taking lexapro 20mg once daily. Endorsed taking gabapentin 300mg TID.      Presented with medications as ordered. Lexapro was ordered at 30mg, however patient took only 20mg, refusing the other 10, saying in the past 30mg made him \"nervous\" and that he \"forgot to tell my doctor I stopped taking 30 and was only taking 20\" mgs.     Social with peers and staff.     Q15 minutes safety checks maintained. Will continue to monitor.     "

## 2025-07-12 NOTE — ED NOTES
[address] [phone]  Dr Sahara ORTEGA pending     Melania Campuzano, EMT  07/12/25 1204

## 2025-07-12 NOTE — PROGRESS NOTES
"EPAT - Social Work Psychiatric Assessment    Arrival Details  Mode of Arrival: Ambulatory  Admission Source: Home  Admission Type: Involuntary  EPAT Assessment Start Date: 07/12/25  EPAT Assessment Start Time: 0620  Name of : Lindsay Olmedo LPC    History of Present Illness  Admission Reason: SI/intoxication  HPI: Patient is a 41yo male with a hx of MDD and DIMA presenting to the ED due to SI. Patient's EMR including; triage notes, provider notes and community records were reviewed. Patient reported being at a bar where he was \"drinking too much\" and left a note. His father accompanied him to the ED and reported that police informed him that patient left a note describing SI w/ plan of hanging (report of method given by patient's mother). Patient admitted to worsening depression and anxiety but confirmed compliance with his medication regimen. He presented with a BAL of 188, noting occasional use of alcohol \"maybe once per month.\" Patient's father reported concerns about patient's SI and increase in depressive symptoms to ED staff. Patient denied SI/HI/AH/VH. Patient has one prior inpatient psychiatric hospitalization at  [facility] at age 12 due to depression and eating disorder.    SW Readmission Information   Readmission within 30 Days: No    Psychiatric Symptoms  Anxiety Symptoms: Generalized  Depression Symptoms: Change in energy level, Feelings of helplessness, Feelings of worthlessness, Loss of interest, Isolative  [place] Symptoms: No problems reported or observed.    Psychosis Symptoms  Hallucination Type: No problems reported or observed.  Delusion Type: No problems reported or observed.    Additional Symptoms - Adult  Generalized Anxiety Disorder: Excessive anxiety/worry, Restlessness, Irritability  Obsessive Compulsive Disorder: No problems reported or observed.  Panic Attack: No problems reported or observed.  Post Traumatic Stress Disorder: No problems reported or observed.  Delirium: No problems " "reported or observed.    Past Psychiatric History/Meds/Treatments  Past Psychiatric History: Patient reported a hx DIMA and MDD. He was hospitalized at 11yo due to MDD and eating disorder.  Past Psychiatric Meds/Treatments: Per patient; currently prescribed 20mg Lexapro, 25mg Klonopin, 300mg Gabapentin 3x daily  Past Violence/Victimization History: Denied    Current Mental Health Contacts   Name/Phone Number: n/a   Last Appointment Date: n/a  Provider Name/Phone Number: Dr. Huitron  Provider Last Appointment Date: \"a month and a half ago\"    Support System: Immediate family    Living Arrangement: House    Home Safety  Feels Safe Living in Home: Yes    Income Information  Employment Status for: Patient  Employment Status: Unemployed    [place] Service/Education History  Current or Previous  Service: None  Education Level: High school  History of Learning Problems:  (Per mother, hx of ADD vs ADHD)    Social/Cultural History  Cultural Requests During Hospitalization: None reported  Spiritual Requests During Hospitalization: None reported    Legal  Legal Considerations: Patient/ Family Ability to Make Healthcare Decisions    Drug Screening  Have you used any substances (canabis, cocaine, heroin, hallucinogens, inhalants, etc.) in the past 12 months?: No  Have you used any prescription drugs other than prescribed in the past 12 months?: No  Is a toxicology screen needed?: Yes         Behavioral Health  Behavioral Health(WDL): Within Defined Limits  Behaviors/Mood: Withdrawn  Affect: Appropriate to circumstances    Orientation  Orientation Level: Oriented X4    General Appearance  Motor Activity: Unremarkable  Speech Pattern: Excessively soft  General Attitude: Withdrawn  Appearance/Hygiene: Unremarkable    Thought Process  Coherency: [place] thinking  Content: Unremarkable  Delusions:  (None elicited)  Perception: Not altered  Hallucination: None  Judgment/Insight: Poor  Confusion: " None  Cognition: Follows commands    Sleep Pattern  Sleep Pattern:  (No issues reported)    Risk Factors  Self Harm/Suicidal Ideation Plan: Denied. Per report, patient wrote a note mentioning SA by hanging.  Previous Self Harm/Suicidal Plans: Denied  Risk Factors: 1st psychiatric hospitalization by age 18, Male, Unemployment    Violence Risk Assessment  Assessment of Violence: None noted  Thoughts of Harm to Others: No    Ability to Assess Risk Screen  Risk Screen - Ability to Assess: Able to be screened  Ask Suicide-Screening Questions  1. In the past few weeks, have you wished you were dead?: No  2. In the past few weeks, have you felt that you or your family would be better off if you were dead?: No  3. In the past week, have you been having thoughts about killing yourself?: No  4. Have you ever tried to kill yourself?: No  5. Are you having thoughts of killing yourself right now?: No  Calculated Risk Score: No intervention is necessary  [place] Suicide Severity Rating Scale (Screener/Recent Self-Report)  1. Wish to be Dead (Past 1 Month): No  2. Non-Specific Active Suicidal Thoughts (Past 1 Month): No  6. Suicidal Behavior (Lifetime): No  Calculated C-SSRS Risk Score (Lifetime/Recent): No Risk Indicated  Step 1: Risk Factors  Current & Past Psychiatric Dx: Mood disorder  Presenting Symptoms: Anxiety and/or panic, Hopelessness or despair  Precipitants/Stressors: Triggering events leading to humiliation, shame, and/or despair (e.g. loss of relationship, financial or health status) (real or anticipated), Substance intoxication or withdrawal  Step 2: Protective Factors   Protective Factors External: Supportive social network or family or friends  Step 5: Documentation  Risk Level: Moderate suicide risk    Psychiatric Impression and Plan of Care  Assessment and Plan:     Patient was assessed when deemed clinically sober. He was A&Ox4 and remained withdrawn but cooperative. When asked what brought him to the ED,  "patient explained that he was at a bar, had a few drinks and left a note \"but it wasn't supposed to be serious.\" Police were called and showed up to his home for a wellness check before he presented to the ED. 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 He reported that he continues to care for himself and is eating/sleeping well but denies engaging in activities on a day-to-day basis. He reported that he often stays home, does not have social interactions and could not identify future plans or goals, aside from taking his exam. He reported that he works with a therapist \"every once in a while\" and meets with his psychiatrist every 3 months. Patient appeared to minimize his symptomology throughout the assessment.     Patient's mother, Natty, provided collateral information. She reported that he has a hx of \"all kinds of stuff,\" noting that he began having issues with his mental health at age 12 due to relentless bullying. She confirmed that patient went to a bar last night and left a note that reflected that he wanted to hang himself. She denied awareness of patient expressing suicidal ideation recently but noted that he has become increasingly irritable and depressed, which she believes may be due to his current medication. She also observed him become \"very agitated more often. He was banging against his head with his fists saying that he was trying to clear his head.\" She was concerned a few days ago \"because his mood flipped and he was extra happy. I didn't know if he was going to do something to himself.\" She believes that patient is often frustrated due to \"being 40 years old, still not working and living with his parents. He wants to do something he can be proud of.\" She also shared recent stressors due to " "patient's dog that is dying and inability to pass an exam. She reported belief that he \"has OCD behavior,\" briefly referencing his preferences with oral care utensils, but struggled to elaborate. She stated that patient \"can't handle hardly anything\" and believes that he would benefit from further assessment.     Due to patient's reported SI w/ plan, increase in depression/anxiety, ongoing risk factors and current minimization of symptoms, he continues to be considered a risk to himself- currently deemed a moderate risk while in a hospital setting. He is being recommended for inpatient psychiatric hospitalization for safety and stabilization. The attending physician is in agreement.     Specific Resources Provided to Patient: Patient will be hospitalized    Outcome/Disposition  Patient's Perception of Outcome Achieved: Unable to assess  Assessment, Recommendations and Risk Level Reviewed with: Dr. Huff  Contact Name: Rosaura Kruse  Contact Number(s): [phone]  Contact Relationship: Parents  EPAT Assessment Completed Date: 07/12/25  EPAT Assessment Completed Time: 0730      "

## 2025-07-12 NOTE — PROGRESS NOTES
Cyrus Kruse is a 40 y.o. male on day 0 of admission presenting with No Principal Problem: There is no principal problem currently on the Problem List. Please update the Problem List and refresh..    Subjective   Patient has been evaluated, TSH within normal limits euglycemic, no electrolyte dyscrasia, normal renal and hepatic function, negative for acetaminophen salicylate or drug metabolites, alcohol mildly elevated at 188, patient appears clinically sober, patient is now cleared for evaluation by EPAT.  Pending evaluation by EPAT and EPAT recommendations.           Nikos Elliott, DO

## 2025-07-12 NOTE — SIGNIFICANT EVENT
Application for Emergency Admission      Ready for Transfer?  Is the patient medically cleared for transfer to inpatient psychiatry: Yes  Has the patient been accepted to an inpatient psychiatric hospital: Yes    Application for Emergency Admission  IN ACCORDANCE WITH [place] 5122[place]10 [place]  The Chief Clinical Officer of: Dr. Shoemaker, [facility] 7/12/2025 .11:10 AM    Reason for Hospitalization  The undersigned has reason to believe that: Cyrus Kruse Is a mentally ill person subject to hospitalization by court order under division B Section 5122.01 of the Revised Code, i.e., this person:    1.Yes  Represents a substantial risk of physical harm to self as manifested by evidence of threats of, or attempts at, suicide or serious self-inflicted bodily harm    2.No Represents a substantial risk of physical harm to others as manifested by evidence of recent homicidal or other violent behavior, evidence of recent threats that place another in reasonable fear of violent behavior and serious physical harm, or other evidence of present dangerousness    3.Yes Represents a substantial and immediate risk of serious physical impairment or injury to self as manifested by  evidence that the person is unable to provide for and is not providing for the person's basic physical needs because of the person's mental illness and that appropriate provision for those needs cannot be made  immediately available in the community    4.Yes Would benefit from treatment in a hospital for his mental illness and is in need of such treatment as manifested by evidence of behavior that creates a grave and imminent risk to substantial rights of others or  himself.    5.Yes Would benefit from treatment as manifested by evidence of behavior that indicates all of the following:       (a) The person is unlikely to survive safely in the community without supervision, based on a clinical determination.       (b) The person has a history of lack of  compliance with treatment for mental illness and one of the following applies:      (i) At least twice within the thirty-six months prior to the filing of an affidavit seeking court-ordered treatment of the person under section 5122.111 of the Revised Code, the lack of compliance has been a significant factor in necessitating hospitalization in a hospital or receipt of services in a forensic or other mental health unit of a correctional facility, provided that the thirty-six-month period shall be extended by the length of any hospitalization or incarceration of the person that occurred within the thirty-six-month period.      (ii) Within the forty-eight months prior to the filing of an affidavit seeking court-ordered treatment of the person under section 5122.111 of the Revised Code, the lack of compliance resulted in one or more acts of serious violent behavior toward self or others or threats of, or attempts at, serious physical harm to self or others, provided that the forty-eight-month period shall be extended by the length of any hospitalization or incarceration of the person that occurred within the forty-eight-month period.      (c) The person, as a result of mental illness, is unlikely to voluntarily participate in necessary treatment.       (d) In view of the person's treatment history and current behavior, the person is in need of treatment in order to prevent a relapse or deterioration that would be likely to result in substantial risk of serious harm to the person or others.    (e) Represents a substantial risk of physical harm to self or others if allowed to remain at liberty pending examination.    Therefore, it is requested that said person be admitted to the above named facility.    STATEMENT OF BELIEF    Must be filled out by one of the following: a psychiatrist, licensed physician, licensed clinical psychologist, health or ,  or .  (Statement shall include the  circumstances under which the individual was taken into custody and the reason for the person's belief that hospitalization is necessary. The statement shall also include a reference to efforts made to secure the individual's property at his residence if he was taken into custody there. Every reasonable and appropriate effort should be made to take this person into custody in the least conspicuous manner possible.)    , [facility].    Patient was evaluated at [facility] we believe that they would benefit from inpatient stay as the patient does have active suicidal ideology.  EPAT's evaluated the patient as well and they indicate that they agree with attendings and resident physicians at [facility].  Patient has been well-behaved and polite here in the emergency department.    Cricket Huff MD 7/12/2025     _____________________________________________________________   Place of Employment: [facility].    STATEMENT OF OBSERVATION BY PSYCHIATRIST, LICENSED PHYSICIAN, OR LICENSED CLINICAL PSYCHOLOGIST, IF APPLICABLE    Place of Observation (e.g., [facility] center, general hospital, office, emergency facility)  (If applicable, please complete)    Cricket Huff MD 7/12/2025    _____________________________________________________________

## 2025-07-12 NOTE — CONSULTS
Consults    Reason For Consult  Medical clearance    History Of Present Illness  Cyrus Kruse is a 40 y.o. male (full code) with a pertinent medical history of constipation/IBS who presented to [facility] ER due to alcohol intoxication and increased depression with suicidal ideation.  Patient was sent to [facility] for further evaluation and treatment.  Hospitalist consulted for medical clearance.  Patient says he has not had a bowel movement in the past 5 days, but has not eaten much either.  Patient has not been taking his Linzess since he ran out.  He has been supplementing with MiraLAX.  No further complaints in the review of systems.    EKG reviewed and showed sinus tachycardia without a prolonged QT and no ST abnormalities.  CMP unremarkable, TSH within normal limits, CBC unremarkable, Tylenol and aspirin levels are negative, ethanol 188, tox screen negative.     Past Medical History  Scoliosis, ADHD, constipation/IBS, depression, anxiety.    Surgical History  He has a past surgical history that includes Other surgical history (09/27/2019), colonoscopy.     Social History  He reports that he has never smoked. He has never used smokeless tobacco. He reports that he does not use drugs.  He reports binge drinking a few times a month and drinks up to 5 drinks at a time.  No history of withdrawal or seizures.  Last drink was 2 days ago.    Family History  Maternal grandfather-depression with ECT therapy and cardiac disorder     Allergies  Clomipramine    Review of Systems  Constitutional:  Negative for chills, diaphoresis, fever   OnRespiratory: Neg for cough, shortness of breath and wheezing.    Cardiovascular:  Negative for chest pain, palpitations and leg swelling.   Gastrointestinal:  Negative for abdominal pain, +constipation, neg for diarrhea, nausea and vomiting.   Genitourinary:  Negative for dysuria, frequency and urgency.   Musculoskeletal:  Negative for back pain and neck pain. No focal weakness.    Skin: Negative for color change and rash.   Neurological:  Negative for dizziness, tremors, seizures, syncope, facial asymmetry, speech difficulty, focal weakness, and headaches.     Physical Exam  Constitutional:       Appearance: Normal appearance. he is normal weight.   HENT:      Head: Normocephalic and atraumatic.      Mouth: Mucous membranes are dry.      Pharynx: Oropharynx is clear. No oropharyngeal exudate or posterior oropharyngeal erythema.   Neck: No JVD or use of accessory muscles  Eyes:      Extraocular Movements: Extraocular movements intact.      Conjunctiva/sclera: Conjunctivae normal.      Pupils: Pupils are equal, round, and reactive to light.   Cardiovascular:      Rate and Rhythm: Normal rate and regular rhythm.      Pulses: Normal pulses.      Heart sounds: Normal heart sounds. No murmur heard.  Pulmonary:      Effort: No respiratory distress.      Breath sounds: No wheezing or rhonchi. No tachypnea or labored breathing.  Abdominal:      General: Bowel sounds are present x's4. There is no distension.      Palpations: Abdomen is soft. Tenderness: There is no abdominal tenderness. There is no guarding or rebound.   Extremities: No swelling or palpable distal pulses.   Musculoskeletal:         General: No swelling or tenderness.      Comments: No focal weakness   Neurological:      Mental Status: he is alert and oriented to person, place, and time. No tremor. CIWA 0.     Cranial Nerves: No gross cranial nerve deficit.      Sensory: No sensory deficit.      Motor: No focal weakness.  Psych: No anxiety or agitation. No hallucinations or visual disturbances.    Last Recorded Vitals  /83 (BP Location: Left arm, Patient Position: Sitting)   Pulse 71   Temp 35.9 °C (96.6 °F) (Temporal)   Resp 18   Wt 81.3 kg (179 lb 3.7 oz)   SpO2 97%     Relevant Results  Scheduled medications  Scheduled Medications[1]  Continuous medications  Continuous Medications[2]  PRN medications  PRN  Medications[3]    Results for orders placed or performed during the hospital encounter of 07/11/25 (from the past 24 hours)   Drug Screen, Urine   Result Value Ref Range    Amphetamine Screen, Urine Presumptive Negative Presumptive Negative    Barbiturate Screen, Urine Presumptive Negative Presumptive Negative    Benzodiazepines Screen, Urine Presumptive Negative Presumptive Negative    Cannabinoid Screen, Urine Presumptive Negative Presumptive Negative    Cocaine Metabolite Screen, Urine Presumptive Negative Presumptive Negative    Fentanyl Screen, Urine Presumptive Negative Presumptive Negative    Opiate Screen, Urine Presumptive Negative Presumptive Negative    Oxycodone Screen, Urine Presumptive Negative Presumptive Negative    PCP Screen, Urine Presumptive Negative Presumptive Negative    Methadone Screen, Urine Presumptive Negative Presumptive Negative   CBC and Auto Differential   Result Value Ref Range    WBC 7.0 4.4 - 11.3 x10*3/uL    nRBC 0.0 0.0 - 0.0 /100 WBCs    RBC 4.95 4.50 - 5.90 x10*6/uL    Hemoglobin 15.7 13.5 - 17.5 g/dL    Hematocrit 46.3 41.0 - 52.0 %    MCV 94 80 - 100 fL    MCH 31.7 26.0 - 34.0 pg    MCHC 33.9 32.0 - 36.0 g/dL    RDW 11.6 11.5 - 14.5 %    Platelets 278 150 - 450 x10*3/uL    Neutrophils % 58.9 40.0 - 80.0 %    Immature Granulocytes %, Automated 0.3 0.0 - 0.9 %    Lymphocytes % 30.0 13.0 - 44.0 %    Monocytes % 6.5 2.0 - 10.0 %    Eosinophils % 3.7 0.0 - 6.0 %    Basophils % 0.6 0.0 - 2.0 %    Neutrophils Absolute 4.11 1.20 - 7.70 x10*3/uL    Immature Granulocytes Absolute, Automated 0.02 0.00 - 0.70 x10*3/uL    Lymphocytes Absolute 2.09 1.20 - 4.80 x10*3/uL    Monocytes Absolute 0.45 0.10 - 1.00 x10*3/uL    Eosinophils Absolute 0.26 0.00 - 0.70 x10*3/uL    Basophils Absolute 0.04 0.00 - 0.10 x10*3/uL   Comprehensive Metabolic Panel   Result Value Ref Range    Glucose 93 74 - 99 mg/dL    Sodium 141 136 - 145 mmol/L    Potassium 3.6 3.5 - 5.3 mmol/L    Chloride 105 98 - 107  mmol/L    Bicarbonate 24 21 - 32 mmol/L    Anion Gap 16 10 - 20 mmol/L    Urea Nitrogen 16 6 - 23 mg/dL    Creatinine 1.16 0.50 - 1.30 mg/dL    eGFR 82 >60 mL/min/1.73m*2    Calcium 9.0 8.6 - 10.3 mg/dL    Albumin 4.5 3.4 - 5.0 g/dL    Alkaline Phosphatase 80 33 - 120 U/L    Total Protein 7.4 6.4 - 8.2 g/dL    AST 28 9 - 39 U/L    Bilirubin, Total 0.6 0.0 - 1.2 mg/dL    ALT 16 10 - 52 U/L   Acute Toxicology Panel, Blood   Result Value Ref Range    Acetaminophen <10.0 10.0 - 30.0 ug/mL    Salicylate  <3 4 - 20 mg/dL    Alcohol 188 (H) <=10 mg/dL   TSH with reflex to Free T4 if abnormal   Result Value Ref Range    Thyroid Stimulating Hormone 1.14 0.44 - 3.98 mIU/L   Lavender Top   Result Value Ref Range    Extra Tube Hold for add-ons.    Electrocardiogram, 12-lead   Result Value Ref Range    Ventricular Rate 105 BPM    Atrial Rate 105 BPM    NE Interval 206 ms    QRS Duration 92 ms    QT Interval 338 ms    QTC Calculation(Bazett) 446 ms    P Axis 62 degrees    R Axis 59 degrees    T Axis 48 degrees    QRS Count 17 beats    Q Onset 219 ms    P Onset 116 ms    P Offset 172 ms    T Offset 388 ms    QTC Fredericia 407 ms       Electrocardiogram, 12-lead  Result Date: 7/12/2025  Sinus tachycardia Otherwise normal ECG When compared with ECG of 23-NOV-2021 14:48, ST no longer elevated in Inferior leads    Assessment/Plan   SI  Psych primary and to f/U regarding routine labs  Supportive care    IBS/Constipation  Resume Linzess  Daily miralax  Strict I's/O's  Monitor for BM    Binge Drinks/Alcohol Use Disorder  CIWA monitoring and valium on hold; if needed  Thiamine, folic and MV  SW consult-appreciate recs    Medical Clearance    DVT PX  Encourage ambulation    Carley Padilla, APRN-CNP             [1] escitalopram, 30 mg, oral, Daily  gabapentin, 100 mg, oral, TID  linaCLOtide, 145 mcg, oral, Daily before breakfast  melatonin, 3 mg, oral, Daily  polyethylene glycol, 17 g, oral, Daily  [2]    [3] PRN medications: acetaminophen,  clonazePAM, diphenhydrAMINE **OR** diphenhydrAMINE, docusate sodium, haloperidol **OR** haloperidol lactate, hydrOXYzine HCL, LORazepam, traZODone

## 2025-07-13 VITALS
TEMPERATURE: 97.3 F | HEIGHT: 69 IN | HEART RATE: 83 BPM | OXYGEN SATURATION: 97 % | SYSTOLIC BLOOD PRESSURE: 127 MMHG | WEIGHT: 179.23 LBS | DIASTOLIC BLOOD PRESSURE: 66 MMHG | BODY MASS INDEX: 26.55 KG/M2 | RESPIRATION RATE: 18 BRPM

## 2025-07-13 PROCEDURE — 99223 1ST HOSP IP/OBS HIGH 75: CPT | Performed by: PSYCHIATRY & NEUROLOGY

## 2025-07-13 PROCEDURE — 2500000001 HC RX 250 WO HCPCS SELF ADMINISTERED DRUGS (ALT 637 FOR MEDICARE OP): Performed by: NURSE PRACTITIONER

## 2025-07-13 PROCEDURE — 1240000001 HC SEMI-PRIVATE BH ROOM DAILY

## 2025-07-13 PROCEDURE — 2500000005 HC RX 250 GENERAL PHARMACY W/O HCPCS: Performed by: PSYCHIATRY & NEUROLOGY

## 2025-07-13 PROCEDURE — 2500000001 HC RX 250 WO HCPCS SELF ADMINISTERED DRUGS (ALT 637 FOR MEDICARE OP): Performed by: PSYCHIATRY & NEUROLOGY

## 2025-07-13 RX ORDER — GABAPENTIN 300 MG/1
300 CAPSULE ORAL 3 TIMES DAILY
Status: DISCONTINUED | OUTPATIENT
Start: 2025-07-13 | End: 2025-07-17 | Stop reason: HOSPADM

## 2025-07-13 RX ORDER — BUPROPION HYDROCHLORIDE 150 MG/1
150 TABLET ORAL DAILY
Status: DISCONTINUED | OUTPATIENT
Start: 2025-07-13 | End: 2025-07-14

## 2025-07-13 RX ORDER — ESCITALOPRAM OXALATE 20 MG/1
20 TABLET ORAL DAILY
Status: DISCONTINUED | OUTPATIENT
Start: 2025-07-13 | End: 2025-07-17 | Stop reason: HOSPADM

## 2025-07-13 RX ADMIN — Medication 1 TABLET: at 09:25

## 2025-07-13 RX ADMIN — ESCITALOPRAM OXALATE 20 MG: 20 TABLET ORAL at 09:25

## 2025-07-13 RX ADMIN — GABAPENTIN 300 MG: 300 CAPSULE ORAL at 15:06

## 2025-07-13 RX ADMIN — GABAPENTIN 300 MG: 300 CAPSULE ORAL at 09:25

## 2025-07-13 RX ADMIN — GABAPENTIN 300 MG: 300 CAPSULE ORAL at 20:31

## 2025-07-13 RX ADMIN — THIAMINE HCL TAB 100 MG 100 MG: 100 TAB at 09:25

## 2025-07-13 RX ADMIN — LINACLOTIDE 144 MCG: 72 CAPSULE, GELATIN COATED ORAL at 06:14

## 2025-07-13 RX ADMIN — BUPROPION HYDROCHLORIDE 150 MG: 150 TABLET, EXTENDED RELEASE ORAL at 09:25

## 2025-07-13 RX ADMIN — Medication 3 MG: at 18:43

## 2025-07-13 RX ADMIN — FOLIC ACID 1 MG: 1 TABLET ORAL at 09:25

## 2025-07-13 SDOH — ECONOMIC STABILITY: HOUSING INSECURITY: AT ANY TIME IN THE PAST 12 MONTHS, WERE YOU HOMELESS OR LIVING IN A SHELTER (INCLUDING NOW)?: NO

## 2025-07-13 SDOH — SOCIAL STABILITY: SOCIAL INSECURITY: WITHIN THE LAST YEAR, HAVE YOU BEEN AFRAID OF YOUR PARTNER OR EX-PARTNER?: NO

## 2025-07-13 SDOH — SOCIAL STABILITY: SOCIAL INSECURITY: WITHIN THE LAST YEAR, HAVE YOU BEEN HUMILIATED OR EMOTIONALLY ABUSED IN OTHER WAYS BY YOUR PARTNER OR EX-PARTNER?: NO

## 2025-07-13 SDOH — SOCIAL STABILITY: SOCIAL INSECURITY
WITHIN THE LAST YEAR, HAVE YOU BEEN RAPED OR FORCED TO HAVE ANY KIND OF SEXUAL ACTIVITY BY YOUR PARTNER OR EX-PARTNER?: NO

## 2025-07-13 SDOH — HEALTH STABILITY: MENTAL HEALTH
DO YOU FEEL STRESS - TENSE, RESTLESS, NERVOUS, OR ANXIOUS, OR UNABLE TO SLEEP AT NIGHT BECAUSE YOUR MIND IS TROUBLED ALL THE TIME - THESE DAYS?: TO SOME EXTENT

## 2025-07-13 SDOH — ECONOMIC STABILITY: INCOME INSECURITY: IN THE PAST 12 MONTHS HAS THE ELECTRIC, GAS, OIL, OR WATER COMPANY THREATENED TO SHUT OFF SERVICES IN YOUR HOME?: NO

## 2025-07-13 SDOH — SOCIAL STABILITY: SOCIAL NETWORK: HOW OFTEN DO YOU GET TOGETHER WITH FRIENDS OR RELATIVES?: PATIENT DECLINED

## 2025-07-13 SDOH — SOCIAL STABILITY: SOCIAL INSECURITY: HAVE YOU HAD ANY THOUGHTS OF HARMING ANYONE ELSE?: NO

## 2025-07-13 SDOH — HEALTH STABILITY: MENTAL HEALTH: HOW OFTEN DO YOU HAVE SIX OR MORE DRINKS ON ONE OCCASION?: LESS THAN MONTHLY

## 2025-07-13 SDOH — SOCIAL STABILITY: SOCIAL INSECURITY: ARE YOU MARRIED, WIDOWED, DIVORCED, SEPARATED, NEVER MARRIED, OR LIVING WITH A PARTNER?: NEVER MARRIED

## 2025-07-13 SDOH — ECONOMIC STABILITY: TRANSPORTATION INSECURITY: IN THE PAST 12 MONTHS, HAS LACK OF TRANSPORTATION KEPT YOU FROM MEDICAL APPOINTMENTS OR FROM GETTING MEDICATIONS?: NO

## 2025-07-13 SDOH — HEALTH STABILITY: PHYSICAL HEALTH
HOW OFTEN DO YOU NEED TO HAVE SOMEONE HELP YOU WHEN YOU READ INSTRUCTIONS, PAMPHLETS, OR OTHER WRITTEN MATERIAL FROM YOUR DOCTOR OR PHARMACY?: NEVER

## 2025-07-13 SDOH — SOCIAL STABILITY: SOCIAL NETWORK: HOW OFTEN DO YOU ATTEND MEETINGS OF THE CLUBS OR ORGANIZATIONS YOU BELONG TO?: NEVER

## 2025-07-13 SDOH — HEALTH STABILITY: MENTAL HEALTH: HOW MANY DRINKS CONTAINING ALCOHOL DO YOU HAVE ON A TYPICAL DAY WHEN YOU ARE DRINKING?: 3 OR 4

## 2025-07-13 SDOH — SOCIAL STABILITY: SOCIAL INSECURITY: HAS ANYONE EVER THREATENED TO HURT YOUR FAMILY OR YOUR PETS?: NO

## 2025-07-13 SDOH — ECONOMIC STABILITY: HOUSING INSECURITY: IN THE PAST 12 MONTHS, HOW MANY TIMES HAVE YOU MOVED WHERE YOU WERE LIVING?: 1

## 2025-07-13 SDOH — SOCIAL STABILITY: SOCIAL INSECURITY: HAVE YOU HAD THOUGHTS OF HARMING ANYONE ELSE?: NO

## 2025-07-13 SDOH — HEALTH STABILITY: MENTAL HEALTH: HOW OFTEN DO YOU HAVE A DRINK CONTAINING ALCOHOL?: 2-4 TIMES A MONTH

## 2025-07-13 SDOH — SOCIAL STABILITY: SOCIAL INSECURITY: DO YOU FEEL ANYONE HAS EXPLOITED OR TAKEN ADVANTAGE OF YOU FINANCIALLY OR OF YOUR PERSONAL PROPERTY?: NO

## 2025-07-13 SDOH — SOCIAL STABILITY: SOCIAL NETWORK
DO YOU BELONG TO ANY CLUBS OR ORGANIZATIONS SUCH AS CHURCH GROUPS, UNIONS, FRATERNAL OR ATHLETIC GROUPS, OR SCHOOL GROUPS?: NO

## 2025-07-13 SDOH — SOCIAL STABILITY: SOCIAL INSECURITY
WITHIN THE LAST YEAR, HAVE YOU BEEN KICKED, HIT, SLAPPED, OR OTHERWISE PHYSICALLY HURT BY YOUR PARTNER OR EX-PARTNER?: NO

## 2025-07-13 SDOH — ECONOMIC STABILITY: FOOD INSECURITY: WITHIN THE PAST 12 MONTHS, YOU WORRIED THAT YOUR FOOD WOULD RUN OUT BEFORE YOU GOT THE MONEY TO BUY MORE.: NEVER TRUE

## 2025-07-13 SDOH — ECONOMIC STABILITY: FOOD INSECURITY: WITHIN THE PAST 12 MONTHS, THE FOOD YOU BOUGHT JUST DIDN'T LAST AND YOU DIDN'T HAVE MONEY TO GET MORE.: NEVER TRUE

## 2025-07-13 SDOH — ECONOMIC STABILITY: FOOD INSECURITY: HOW HARD IS IT FOR YOU TO PAY FOR THE VERY BASICS LIKE FOOD, HOUSING, MEDICAL CARE, AND HEATING?: NOT HARD AT ALL

## 2025-07-13 SDOH — ECONOMIC STABILITY: HOUSING INSECURITY: IN THE LAST 12 MONTHS, WAS THERE A TIME WHEN YOU WERE NOT ABLE TO PAY THE MORTGAGE OR RENT ON TIME?: NO

## 2025-07-13 SDOH — HEALTH STABILITY: PHYSICAL HEALTH: ON AVERAGE, HOW MANY MINUTES DO YOU ENGAGE IN EXERCISE AT THIS LEVEL?: 0 MIN

## 2025-07-13 SDOH — SOCIAL STABILITY: SOCIAL NETWORK: IN A TYPICAL WEEK, HOW MANY TIMES DO YOU TALK ON THE PHONE WITH FAMILY, FRIENDS, OR NEIGHBORS?: PATIENT DECLINED

## 2025-07-13 SDOH — HEALTH STABILITY: MENTAL HEALTH: EXPERIENCED ANY OF THE FOLLOWING LIFE EVENTS: SOCIAL LOSS (BANKRUPTCY, DIVORCE, WORK-RELATED STRESS)

## 2025-07-13 SDOH — HEALTH STABILITY: PHYSICAL HEALTH: ON AVERAGE, HOW MANY DAYS PER WEEK DO YOU ENGAGE IN MODERATE TO STRENUOUS EXERCISE (LIKE A BRISK WALK)?: 0 DAYS

## 2025-07-13 SDOH — SOCIAL STABILITY: SOCIAL NETWORK: HOW OFTEN DO YOU ATTEND CHURCH OR RELIGIOUS SERVICES?: NEVER

## 2025-07-13 SDOH — SOCIAL STABILITY: SOCIAL INSECURITY: DOES ANYONE TRY TO KEEP YOU FROM HAVING/CONTACTING OTHER FRIENDS OR DOING THINGS OUTSIDE YOUR HOME?: NO

## 2025-07-13 SDOH — SOCIAL STABILITY: SOCIAL INSECURITY: DO YOU FEEL UNSAFE GOING BACK TO THE PLACE WHERE YOU ARE LIVING?: NO

## 2025-07-13 SDOH — SOCIAL STABILITY: SOCIAL INSECURITY: ARE YOU OR HAVE YOU BEEN THREATENED OR ABUSED PHYSICALLY, EMOTIONALLY, OR SEXUALLY BY ANYONE?: NO

## 2025-07-13 SDOH — SOCIAL STABILITY: SOCIAL INSECURITY: ARE THERE ANY APPARENT SIGNS OF INJURIES/BEHAVIORS THAT COULD BE RELATED TO ABUSE/NEGLECT?: NO

## 2025-07-13 SDOH — SOCIAL STABILITY: SOCIAL INSECURITY: WERE YOU ABLE TO COMPLETE ALL THE BEHAVIORAL HEALTH SCREENINGS?: YES

## 2025-07-13 SDOH — SOCIAL STABILITY: SOCIAL INSECURITY: ABUSE: ADULT

## 2025-07-13 ASSESSMENT — LIFESTYLE VARIABLES
VISUAL DISTURBANCES: NOT PRESENT
TREMOR: NO TREMOR
NAUSEA AND VOMITING: NO NAUSEA AND NO VOMITING
AUDIT-C TOTAL SCORE: 3
ANXIETY: NO ANXIETY, AT EASE
ORIENTATION AND CLOUDING OF SENSORIUM: ORIENTED AND CAN DO SERIAL ADDITIONS
AGITATION: NORMAL ACTIVITY
TREMOR: NO TREMOR
VISUAL DISTURBANCES: NOT PRESENT
PULSE: 93
HEADACHE, FULLNESS IN HEAD: NOT PRESENT
VISUAL DISTURBANCES: NOT PRESENT
TREMOR: NO TREMOR
TREMOR: NO TREMOR
BLOOD PRESSURE: 123/76
NAUSEA AND VOMITING: NO NAUSEA AND NO VOMITING
PAROXYSMAL SWEATS: NO SWEAT VISIBLE
AUDITORY DISTURBANCES: NOT PRESENT
HEADACHE, FULLNESS IN HEAD: NOT PRESENT
HEADACHE, FULLNESS IN HEAD: NOT PRESENT
VISUAL DISTURBANCES: NOT PRESENT
NAUSEA AND VOMITING: NO NAUSEA AND NO VOMITING
AGITATION: NORMAL ACTIVITY
NAUSEA AND VOMITING: NO NAUSEA AND NO VOMITING
ORIENTATION AND CLOUDING OF SENSORIUM: ORIENTED AND CAN DO SERIAL ADDITIONS
AGITATION: NORMAL ACTIVITY
NAUSEA AND VOMITING: NO NAUSEA AND NO VOMITING
HOW MANY STANDARD DRINKS CONTAINING ALCOHOL DO YOU HAVE ON A TYPICAL DAY: 3 OR 4
PAROXYSMAL SWEATS: NO SWEAT VISIBLE
NAUSEA AND VOMITING: NO NAUSEA AND NO VOMITING
AUDITORY DISTURBANCES: NOT PRESENT
NAUSEA AND VOMITING: NO NAUSEA AND NO VOMITING
ANXIETY: NO ANXIETY, AT EASE
AUDIT-C TOTAL SCORE: 3
NAUSEA AND VOMITING: NO NAUSEA AND NO VOMITING
HEADACHE, FULLNESS IN HEAD: NOT PRESENT
SKIP TO QUESTIONS 9-10: 0
ORIENTATION AND CLOUDING OF SENSORIUM: ORIENTED AND CAN DO SERIAL ADDITIONS
TREMOR: NO TREMOR
HOW OFTEN DO YOU HAVE 6 OR MORE DRINKS ON ONE OCCASION: LESS THAN MONTHLY
TOTAL SCORE: 1
NAUSEA AND VOMITING: NO NAUSEA AND NO VOMITING
ANXIETY: MILDLY ANXIOUS
TOTAL SCORE: 1
TOTAL SCORE: 1
HEADACHE, FULLNESS IN HEAD: NOT PRESENT
AUDITORY DISTURBANCES: NOT PRESENT
AGITATION: NORMAL ACTIVITY
VISUAL DISTURBANCES: NOT PRESENT
TOTAL SCORE: 0
ANXIETY: NO ANXIETY, AT EASE
HEADACHE, FULLNESS IN HEAD: NOT PRESENT
AUDITORY DISTURBANCES: NOT PRESENT
AUDIT-C TOTAL SCORE: 4
PAROXYSMAL SWEATS: NO SWEAT VISIBLE
ANXIETY: NO ANXIETY, AT EASE
PAROXYSMAL SWEATS: NO SWEAT VISIBLE
CIWA TOTAL SCORE: 1
VISUAL DISTURBANCES: NOT PRESENT
TREMOR: NO TREMOR
TOTAL SCORE: 0
HEADACHE, FULLNESS IN HEAD: NOT PRESENT
ANXIETY: MILDLY ANXIOUS
ANXIETY: NO ANXIETY, AT EASE
HEADACHE, FULLNESS IN HEAD: NOT PRESENT
NAUSEA AND VOMITING: NO NAUSEA AND NO VOMITING
AUDITORY DISTURBANCES: NOT PRESENT
TOTAL_SCORE: 1
PAROXYSMAL SWEATS: NO SWEAT VISIBLE
VISUAL DISTURBANCES: NOT PRESENT
ANXIETY: NO ANXIETY, AT EASE
AUDITORY DISTURBANCES: NOT PRESENT
ORIENTATION AND CLOUDING OF SENSORIUM: ORIENTED AND CAN DO SERIAL ADDITIONS
NAUSEA AND VOMITING: NO NAUSEA AND NO VOMITING
PULSE: 66
TREMOR: NO TREMOR
ORIENTATION AND CLOUDING OF SENSORIUM: ORIENTED AND CAN DO SERIAL ADDITIONS
TREMOR: NO TREMOR
SUBSTANCE_ABUSE_PAST_12_MONTHS: NO
TREMOR: NO TREMOR
ORIENTATION AND CLOUDING OF SENSORIUM: ORIENTED AND CAN DO SERIAL ADDITIONS
AGITATION: NORMAL ACTIVITY
VISUAL DISTURBANCES: NOT PRESENT
AGITATION: NORMAL ACTIVITY
TREMOR: NO TREMOR
ORIENTATION AND CLOUDING OF SENSORIUM: ORIENTED AND CAN DO SERIAL ADDITIONS
AGITATION: NORMAL ACTIVITY
SKIP TO QUESTIONS 9-10: 0
TREMOR: NO TREMOR
PAROXYSMAL SWEATS: NO SWEAT VISIBLE
PAROXYSMAL SWEATS: NO SWEAT VISIBLE
PULSE: 66
TOTAL SCORE: 0
TREMOR: NO TREMOR
HEADACHE, FULLNESS IN HEAD: NOT PRESENT
VISUAL DISTURBANCES: NOT PRESENT
PAROXYSMAL SWEATS: NO SWEAT VISIBLE
ORIENTATION AND CLOUDING OF SENSORIUM: ORIENTED AND CAN DO SERIAL ADDITIONS
AUDITORY DISTURBANCES: NOT PRESENT
AGITATION: NORMAL ACTIVITY
AUDITORY DISTURBANCES: NOT PRESENT
ORIENTATION AND CLOUDING OF SENSORIUM: ORIENTED AND CAN DO SERIAL ADDITIONS
ORIENTATION AND CLOUDING OF SENSORIUM: ORIENTED AND CAN DO SERIAL ADDITIONS
TOTAL SCORE: 0
ORIENTATION AND CLOUDING OF SENSORIUM: ORIENTED AND CAN DO SERIAL ADDITIONS
AGITATION: NORMAL ACTIVITY
PAROXYSMAL SWEATS: NO SWEAT VISIBLE
PAROXYSMAL SWEATS: NO SWEAT VISIBLE
HOW OFTEN DO YOU HAVE A DRINK CONTAINING ALCOHOL: MONTHLY OR LESS
TOTAL SCORE: 0
TOTAL SCORE: 1
ANXIETY: MILDLY ANXIOUS
VISUAL DISTURBANCES: NOT PRESENT
AUDITORY DISTURBANCES: NOT PRESENT
AGITATION: NORMAL ACTIVITY
TREMOR: NO TREMOR
BLOOD PRESSURE: 113/77
VISUAL DISTURBANCES: NOT PRESENT
ANXIETY: MILDLY ANXIOUS
AUDITORY DISTURBANCES: NOT PRESENT
HEADACHE, FULLNESS IN HEAD: NOT PRESENT
AUDITORY DISTURBANCES: NOT PRESENT
NAUSEA AND VOMITING: NO NAUSEA AND NO VOMITING
VISUAL DISTURBANCES: NOT PRESENT
AGITATION: NORMAL ACTIVITY
TOTAL SCORE: 0
AUDITORY DISTURBANCES: NOT PRESENT
ANXIETY: NO ANXIETY, AT EASE
TOTAL SCORE: 0
HEADACHE, FULLNESS IN HEAD: NOT PRESENT
PAROXYSMAL SWEATS: NO SWEAT VISIBLE
HEADACHE, FULLNESS IN HEAD: NOT PRESENT
NAUSEA AND VOMITING: NO NAUSEA AND NO VOMITING
ANXIETY: MILDLY ANXIOUS
HEADACHE, FULLNESS IN HEAD: NOT PRESENT
ANXIETY: NO ANXIETY, AT EASE
PAROXYSMAL SWEATS: NO SWEAT VISIBLE
PAROXYSMAL SWEATS: NO SWEAT VISIBLE
AUDITORY DISTURBANCES: NOT PRESENT
VISUAL DISTURBANCES: NOT PRESENT
ORIENTATION AND CLOUDING OF SENSORIUM: ORIENTED AND CAN DO SERIAL ADDITIONS
PRESCIPTION_ABUSE_PAST_12_MONTHS: NO
TOTAL SCORE: 0
ORIENTATION AND CLOUDING OF SENSORIUM: ORIENTED AND CAN DO SERIAL ADDITIONS

## 2025-07-13 ASSESSMENT — PATIENT HEALTH QUESTIONNAIRE - PHQ9
1. LITTLE INTEREST OR PLEASURE IN DOING THINGS: MORE THAN HALF THE DAYS
SUM OF ALL RESPONSES TO PHQ9 QUESTIONS 1 & 2: 4
2. FEELING DOWN, DEPRESSED OR HOPELESS: MORE THAN HALF THE DAYS

## 2025-07-13 ASSESSMENT — PAIN - FUNCTIONAL ASSESSMENT
PAIN_FUNCTIONAL_ASSESSMENT: 0-10
PAIN_FUNCTIONAL_ASSESSMENT: 0-10

## 2025-07-13 ASSESSMENT — PAIN SCALES - GENERAL
PAINLEVEL_OUTOF10: 0 - NO PAIN
PAINLEVEL_OUTOF10: 0 - NO PAIN

## 2025-07-13 ASSESSMENT — ACTIVITIES OF DAILY LIVING (ADL)
LACK_OF_TRANSPORTATION: NO
LACK_OF_TRANSPORTATION: NO

## 2025-07-13 NOTE — SIGNIFICANT EVENT
07/13/25 1425   Discharge Planning   Living Arrangements Parent   Support Systems Family members;Parent   Assistance Needed stabilization of mental health   Type of Residence Private residence   Do you have animals or pets at home? Yes   Type of Animals or Pets dog   Who is requesting discharge planning? Patient   Home or Post Acute Services Community services   Expected Discharge Disposition Home   Financial Resource Strain   How hard is it for you to pay for the very basics like food, housing, medical care, and heating? Not very   Housing Stability   In the last 12 months, was there a time when you were not able to pay the mortgage or rent on time? N   At any time in the past 12 months, were you homeless or living in a shelter (including now)? N   Transportation Needs   In the past 12 months, has lack of transportation kept you from medical appointments or from getting medications? no   In the past 12 months, has lack of transportation kept you from meetings, work, or from getting things needed for daily living? No   Stroke Family Assessment   Stroke Family Assessment Needed No   Intensity of Service   Intensity of Service >30 min     Cyrus is a 39 y/o CM admitted to  with SI. He has a PPH of MDD and DIMA, eating disorder at age 12.  Prior to assessment, pt's provider notes, triage notes, and community record were reviewed. Met with pt in his room today. He was lying in bed, but sat up for the interview. He was calm and cooperative. He reports lifelong depression. He was in a bar and drank to much and apparently wrote on a slip that he had SI with a plan to hang himself. The  called PD who showed up at his house. Then his parents brought him to the hospital. He does not remember writing this, but does endorse low mood, lack of hope, motivation, feeling bad about self, anhedonia, feeling helpless and worthless. He identifies his parents as his main supports. He is on disability and does not work. 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 He has a Psychiatrist, but not a therapist. Pt signed a AGUILA to obtain collateral from mother. IMM reviewed and signed. Pt signed a voluntary application for admission.     Stabilize; medications per MD order  Milieu therapy  OT consult  Obtain collateral  Set up post hospital appointments; referral for therapy  DC home

## 2025-07-13 NOTE — SIGNIFICANT EVENT
07/13/25 1423   Able to Complete Psychiatric Screening   Were you able to complete all the behavioral health screenings? Yes   Abuse Screen   Abuse Screen Adult   Have you had any thoughts of harming anyone else? No   Are you or have you been threatened or abused physically, emotionally, or sexually by anyone? No   Has anyone ever threatened to hurt your family or your pets? No   Does anyone try to keep you from having/contacting other friends or doing things outside your home? No   Do you feel UNSAFE going back to the place where you are living? No   Do you feel anyone has exploited or taken advantage of you financially or of your personal property? No   Are there any apparent signs of injuries/behaviors that could be related to abuse/neglect? No   Trauma/Abuse Assessment   Physical Abuse Denies, provider concerned (Comment)   Verbal Abuse Denies, provider concerned (Comment)   Experienced Any of the Following Life Events Social loss (Bankruptcy, divorce, work-related stress)   Drug Screening   Have you used any substances (canabis, cocaine, heroin, hallucinogens, inhalants, etc.) in the past 12 months? No   Have you used any prescription drugs other than prescribed in the past 12 months? No   Is a toxicology screen needed? Yes   Audit Alcohol Screening   Q1: How often do you have a drink containing alcohol? Monthly or l   Q2: How many drinks containing alcohol do you have on a typical day when you are drinking? 3 or 4   Q3: How often do you have six or more drinks on one occasion? Less than mo   Audit-C Score 3   Over the past 2 weeks, how often have you been bothered by any of the following problems?   Little interest or pleasure in doing things Over half   Feeling down, depressed, or hopeless Over half   Have you had thoughts of harming anyone else? No   Values/Beliefs   Cultural Requests During Hospitalization na   Spiritual Requests During Hospitalization na   Patient Strengths/Barriers   Strengths (Must  "Choose Two) Support from family;Motivation level for treatment;Stable domestic situation   Barriers Support from family;Support of organized community   Consults    Consult Needed Yes (Comment)   Spiritual Care Consult Needed No     Social Work Psychiatric Assessment     Arrival Details  Mode of Arrival: Ambulance  Admission Source: [facility]  Admission Type: voluntary     History of Present Illness  Admission Reason: SI/intoxication  HPI: (Per EPAT):  Patient is a 41yo male with a hx of MDD and DIMA presenting to the ED due to SI. Patient's EMR including; triage notes, provider notes and community records were reviewed. Patient reported being at a bar where he was \"drinking too much\" and left a note. His father accompanied him to the ED and reported that police informed him that patient left a note describing SI w/ plan of hanging (report of method given by patient's mother). Patient admitted to worsening depression and anxiety but confirmed compliance with his medication regimen. He presented with a BAL of 188, noting occasional use of alcohol \"maybe once per month.\" Patient's father reported concerns about patient's SI and increase in depressive symptoms to ED staff. Patient denied SI/HI/AH/VH. Patient has one prior inpatient psychiatric hospitalization at  [facility] at age 12 due to depression and eating disorder.   Patient was assessed when deemed clinically sober. He was A&Ox4 and remained withdrawn but cooperative. When asked what brought him to the ED, patient explained that he was at a bar, had a few drinks and left a note \"but it wasn't supposed to be serious.\" Police were called and showed up to his home for a wellness check before he presented to the ED. When asked about the content in the note, patient stated \"I don't remember.\" He denied SI/HI/AH/VH but admitted to feeling increasingly anxious and depressed due to an exam he has to take to become an SHAYLA, which he has failed on 4 other " "occasions. He reported that he continues to care for himself and is eating/sleeping well but denies engaging in activities on a day-to-day basis. He reported that he often stays home, does not have social interactions and could not identify future plans or goals, aside from taking his exam. He reported that he works with a therapist \"every once in a while\" and meets with his psychiatrist every 3 months. Patient appeared to minimize his symptomology throughout the assessment.      Patient's mother, Natty, provided collateral information. She reported that he has a hx of \"all kinds of stuff,\" noting that he began having issues with his mental health at age 12 due to relentless bullying. She confirmed that patient went to a bar last night and left a note that reflected that he wanted to hang himself. She denied awareness of patient expressing suicidal ideation recently but noted that he has become increasingly irritable and depressed, which she believes may be due to his current medication. She also observed him become \"very agitated more often. He was banging against his head with his fists saying that he was trying to clear his head.\" She was concerned a few days ago \"because his mood flipped and he was extra happy. I didn't know if he was going to do something to himself.\" She believes that patient is often frustrated due to \"being 40 years old, still not working and living with his parents. He wants to do something he can be proud of.\" She also shared recent stressors due to patient's dog that is dying and inability to pass an exam. She reported belief that he \"has OCD behavior,\" briefly referencing his preferences with oral care utensils, but struggled to elaborate. She stated that patient \"can't handle hardly anything\" and believes that he would benefit from further assessment.      Due to patient's reported SI w/ plan, increase in depression/anxiety, ongoing risk factors and current minimization of symptoms, he " continues to be considered a risk to himself- currently deemed a moderate risk while in a hospital setting. He is being recommended for inpatient psychiatric hospitalization for safety and stabilization. The attending physician is in agreement.     SW Readmission Information   Readmission within 30 Days: No     Psychiatric Symptoms  Anxiety Symptoms: Generalized  Depression Symptoms: Change in energy level, Feelings of helplessness, Feelings of worthlessness, Loss of interest, Isolative  [place] Symptoms: No problems reported or observed.     Psychosis Symptoms  Hallucination Type: No problems reported or observed.  Delusion Type: No problems reported or observed.     Additional Symptoms - Adult  Generalized Anxiety Disorder: Excessive anxiety/worry, Restlessness, Irritability  Obsessive Compulsive Disorder: No problems reported or observed.  Panic Attack: No problems reported or observed.  Post Traumatic Stress Disorder: No problems reported or observed.  Delirium: No problems reported or observed.     Past Psychiatric History/Meds/Treatments  Past Psychiatric History: Patient reported a hx DIMA and MDD. He was hospitalized at 11yo due to MDD and eating disorder. Patient has one prior inpatient psychiatric hospitalization at [facility] at age 12 for few months due to depression and eating disorder. Pt reports eating disorder is recovered but his depression has been chronic and he has tried many medications (pt recalled Lithium, ablify, Seroquel, lutuda, Haldol, Prozac, Cymbalta, Adderall). In the early days, he was seen by a psychiatrist Dr. Cedeno for 6-8 years dx'ed with bipolar disorder; currently he was treated with new psychiatrist Dr. Marbella Coffey for past 3 years who diagnosed pt with MDD and prescribed Lexapro (30mg/day) but he only takes 20mg/day, gabapentin 300mg TID and Klonpin 0.5mg PRN. Pt reports Lexapro helps with about 50% better.     Past Psychiatric Meds/Treatments: Per patient; currently prescribed 20mg  "Lexapro, 25mg Klonopin, 300mg Gabapentin 3x daily  Past Violence/Victimization History: Denied    Family History: Grandfather received ECT; mother with depression hx     Current Mental Health Contacts   Name/Phone Number: n/a   Last Appointment Date: n/a  Provider Name/Phone Number: Dr. Huitron  Provider Last Appointment Date: \"a month and a half ago\"     Support System: Immediate family     Living Arrangement: House     Home Safety  Feels Safe Living in Home: Yes     Income Information  Employment Status for: Patient  Employment Status: Unemployed/Disabled      Service/Education History  Current or Previous  Service: None  Education Level: High school  History of Learning Problems:  (Per mother, hx of ADD vs ADHD)     Social/Cultural History  Cultural Requests During Hospitalization: None reported  Spiritual Requests During Hospitalization: None reported     Legal  Legal Considerations: Patient/ Family Ability to Make Healthcare Decisions     Drug Screening  Have you used any substances (cannabis, cocaine, heroin, hallucinogens, inhalants, etc.) in the past 12 months?: No  Have you used any prescription drugs other than prescribed in the past 12 months?: No  Is a toxicology screen needed?: Yes        Behavioral Health  Behavioral Health(WDL): Within Defined Limits  Behaviors/Mood: Withdrawn  Affect: Appropriate to circumstances     Orientation  Orientation Level: Oriented X4     General Appearance  Motor Activity: Unremarkable  Speech Pattern: Excessively soft  General Attitude: Withdrawn  Appearance/Hygiene: Unremarkable     Thought Process  Coherency: [place] thinking  Content: Unremarkable  Delusions:  (None elicited)  Perception: Not altered  Hallucination: None  Judgment/Insight: Poor  Confusion: None  Cognition: Follows commands     Sleep Pattern  Sleep Pattern:  (No issues reported)     Risk Factors  Self Harm/Suicidal Ideation Plan: Denied. Per report, patient wrote a " note mentioning SA by hanging.  Previous Self Harm/Suicidal Plans: Denied  Risk Factors: 1st psychiatric hospitalization by age 18, Male, Unemployment     Violence Risk Assessment  Assessment of Violence: None noted  Thoughts of Harm to Others: No     Ability to Assess Risk Screen  Risk Screen - Ability to Assess: Able to be screened  Ask Suicide-Screening Questions  1. In the past few weeks, have you wished you were dead?: No  2. In the past few weeks, have you felt that you or your family would be better off if you were dead?: No  3. In the past week, have you been having thoughts about killing yourself?: No  4. Have you ever tried to kill yourself?: No  5. Are you having thoughts of killing yourself right now?: No  Calculated Risk Score: No intervention is necessary  [place]es Suicide Severity Rating Scale (Screener/Recent Self-Report)  1. Wish to be Dead (Past 1 Month): No  2. Non-Specific Active Suicidal Thoughts (Past 1 Month): No  6. Suicidal Behavior (Lifetime): No  Calculated C-SSRS Risk Score (Lifetime/Recent): No Risk Indicated  Step 1: Risk Factors  Current & Past Psychiatric Dx: Mood disorder  Presenting Symptoms: Anxiety and/or panic, Hopelessness or despair  Precipitants/Stressors: Triggering events leading to humiliation, shame, and/or despair (e.g. loss of relationship, financial or health status) (real or anticipated), Substance intoxication or withdrawal  Step 2: Protective Factors   Protective Factors External: Supportive social network or family or friends  Step 5: Documentation  Risk Level: Moderate suicide risk     Psychiatric Impression and Plan of Care  Assessment and Plan: Cyrus is a 39 y/o CM admitted to  with SI. He has a PPH of MDD and DIMA, eating disorder at age 12.  Prior to assessment, pt's provider notes, triage notes, and community record were reviewed. Met with pt in his room today. He was lying in bed, but sat up for the interview. He was calm and cooperative. He reports  lifelong depression. He was in a bar and drank to much and apparently wrote on a slip that he had SI with a plan to hang himself. The  called PD who showed up at his house. Then his parents brought him to the hospital. He does not remember writing this, but does endorse low mood, lack of hope, motivation, feeling bad about self, anhedonia, feeling helpless and worthless. He identifies his parents as his main supports. He is on disability and does not work. He has been trying to pass a test to become an OT assist. He has a Psychiatrist, but not a therapist. Pt signed a AGUILA to obtain collateral from mother. IMM reviewed and signed. Pt signed a voluntary application for admission.     Stabilize; medications per MD order  Milieu therapy  OT consult  Obtain collateral  Set up post hospital appointments; referral for therapy  DC home

## 2025-07-13 NOTE — CARE PLAN
"The patient's goals for the shift include \"Get out of here. Go to the bathroom.\"    The clinical goals for the shift include Maintain safety, medication adherence, participation in unit activities.    Over the shift, the patient did make progress toward the following goals. Barriers to progression include acuity of illness. Recommendations to address these barriers include patient education, continuation of care plan.      Problem: Safety - Adult  Goal: Free from fall injury  Outcome: Progressing       "

## 2025-07-13 NOTE — CARE PLAN
Problem: Discharge Planning - Care Management  Goal: Discharge to post-acute care or home with appropriate resources  Outcome: Progressing     Problem: Community resource needs  Goal: Patient is receiving increased resource support to enhance ability to remain at home  Outcome: Progressing     Problem: Mental health issues  Goal: Stabilize adverse mental health factors affecting plan of care  Outcome: Progressing

## 2025-07-13 NOTE — GROUP NOTE
Group Topic: Art Creative   Group Date: 7/13/2025  Start Time: 1400  End Time: 1530  Facilitators: MACI MandujanoS   Department: [facility] REHAB THERAPY VIRTUAL    Number of Participants: 8   Group Focus: leisure skills and social skills  Treatment Modality: Recreational Therapy   Interventions utilized were Ceramics, Music, exploration and leisure development  Purpose: other: creative expression, leisure awareness, social engagement     Name: Cyrus Kruse YOB: 1985   MR: [id number]      Facilitator: Recreational Therapist  Level of Participation: moderate  Quality of Participation: appropriate/pleasant, cooperative, engaged, and passive  Interactions with others: appropriate  Mood/Affect: appropriate and brightens with interaction  Triggers (if applicable): n/a  Cognition: coherent/clear and goal directed  Progress: Moderate  Comments: Patients were gathered to participate in (Acrylic Painting/Ceramics). This activity works on creative expression, fine motor skills, following directions, positive social interaction, and leisure awareness.    Pt declined to participate in painting, but with encouragement did request colored pencils to work on a large mandala picture. He socialized with a peer and requested music.     Plan: continue with services

## 2025-07-13 NOTE — PROGRESS NOTES
" REHAB Therapy Assessment & Treatment    Patient Name: Cyrus Kruse  MRN: [id number]  Today's Date: 7/13/2025      Activity Assessment:  Initial Assessment  Attention Span: 60 Minutes or more  Cognitive Behavior Status/Orientation: Person, Place, Time, Attentive, Capable  Crisis Triggers: Emotions, Mood, Work, Other (Comment) (worsening depression and irritability, dog passing away, inability to pass SHAYLA exam, history of being bullied)  Emotional Concerns/Mood/Affect: Cooperative, Depressed, Flat/blunted, Participative, Friendly  Hearing: Adequate  Memory: Memory intact  Motivation Level: Minimum encouragement needed  Negative Coping Skills: Substance use (alcohol (binge drinking few times a month))  Speech/Communication/Socialization: Verbal  Vision: Adequate, Glasses    Leisure Survey:  [facility]  Activity Preference: Independent, Group  Activity Tolerance: Fair 15-30 minutes  At Home ADL Deficits:  (pt is independent)  Barriers to Leisure Participation: Emotions, Mood/affect, Lack of motivation, Low self-esteem  Community Resources:  (outpatient therapist (rarely sees), expressed interest is JOSE)  Creative Activities:  ((past))  Education/School: college - SHAYLA  Following Directions: Able to follow multi-step commands  Leisure Interests: Needs to expand leisure interests  Living Arrangement: Relative (parents)  Motivators for Recreation/Leisure Involvement: Physical benefits, Relaxation, Self-esteem/sense of accomplishment, Fun/entertainment, Sense of well being/contentment  Outdoor Activities: Other (Comment) (time with dog, walks, shoot hoops)  Patient/Family Education Needs: safety awareness, health coping skills  Patient Strengths: empathetic  Patient Weakness: listening skills  Physial Activity: Fitness/exercise  Social/Group Activities:  (does not like \"opening up\" to others, related to history of bullying)  Solitary Activities: Watch/listen television, Reading, Music  Special " "Hobbies: coping skills - breathing, exercise  Transportation: Drives  Work/Volunteer: unemployed, attempting to pass SHAYLA exam  Additional Comments: Pt is a 40 year old M with a history of MDD, DIMA, ADHD, and eating disorder (childhood), admitted due to suicidal ideation while intoxicated. Pt was calm, friendly, and willing to talk upon approach.Pt appeared remorseful of actions leading to admission, stating, \"I really don't drink much, but obviously I can't be doing that\". Pt expressed frustration and disappointment with current status in life, especially related to his career and inablity to pass SHAYLA exam. Pt talked briefly about a history of being bullied in school and struggles with trust and \"opening up\" to others. Pt identified his parents as \"great supports\", but does not have any friends currently. He enjoys working out, reading, and music, but also feels out of his routine here. He was informed of daily programming and leisure materials available on the unit. He will be encouraged to attend a variety of groups during his stay.       Therapeutic Recreation:         Encounter Problems       Encounter Problems (Active)       Emotional BH RT       Mood       Start:  07/13/25    Expected End:  07/20/25               Physical BH RT       Participation       Start:  07/13/25    Expected End:  07/20/25               Social       Stimulation       Start:  07/13/25    Expected End:  07/20/25                     Education Documentation  No documentation found.  Education Comments  No comments found.                    "

## 2025-07-13 NOTE — GROUP NOTE
"Group Topic: Spiritual/Devotional/Thought of the Day   Group Date: 7/13/2025  Start Time: 0930  End Time: 1020  Facilitators: MACI MandujanoS   Department: [facility] REHAB THERAPY VIRTUAL    Number of Participants: 8   Group Focus: daily focus, personal responsibility, and social skills  Treatment Modality: Recreational Therapy   Interventions utilized were Thought - \"Concern should drive us...\", Community Meeting, exploration, patient education, and support  Purpose: coping skills, maladaptive thinking, insight or knowledge, self-worth, and self-care    Name: Cyrus Kruse YOB: 1985   MR: [id number]      Facilitator: Recreational Therapist  Level of Participation: moderate  Quality of Participation: appropriate/pleasant, cooperative, and engaged  Interactions with others: appropriate  Mood/Affect: appropriate and brightens with interaction  Triggers (if applicable): n/a  Cognition: coherent/clear and goal directed  Progress: Moderate  Comments: Patients were provided with the Thought of the Day reading - “\"My actions today should reflect my concerns and be appropriate to the need.” Patients were given an opportunity to share their thoughts and encouraged to create a personal goal based on the reading.     This session also included providing participants an opportunity to understand unit guidelines, rules, expectations, and provide a healthy environment to give suggestions for unit improvements. CTRS prioritized suggestions to discuss and Community Meeting questionnaire slips were passed out and collected.    Pt was calm, cooperative, and attentive this morning. He participated in group ice breaker and listened during discussion. As this was pt's first group, he identified goals related to orienting and getting comfortable on the unit.     Plan: continue with services      "

## 2025-07-13 NOTE — CARE PLAN
"The patient's goals for the shift include \"Get out of here. Go to the bathroom.\"    The clinical goals for the shift include Maintain safety, medication adherence, participation in unit activities.    Over the shift, the patient did make progress toward the following goals. Barriers to progression include acuity of illness. Recommendations to address these barriers include continuation of care plan.      Problem: Sensory Perceptual Alteration as Evidenced by  Goal: Participates in unit activities  Outcome: Progressing     Problem: Potential for Harm to Self or Others  Goal: Denies harm toward self or others  Outcome: Progressing     Problem: Potential for Harm to Self or Others  Goal: Free from restraint events  Outcome: Progressing     Problem: Potential for Substance Withdrawal  Goal: Free of withdrawal symptoms  Outcome: Progressing     Problem: Self Care Deficit  Goal: Accepts need for medications  Outcome: Progressing         "

## 2025-07-13 NOTE — GROUP NOTE
"Group Topic: Leisure Skills   Group Date: 7/13/2025  Start Time: 1115  End Time: 1200  Facilitators: JENNIFER Mandujano   Department: [facility] REHAB THERAPY VIRTUAL    Number of Participants: 10   Group Focus: leisure skills and social skills  Treatment Modality: Recreational Therapy   Interventions utilized were Herd Mentality, exploration, leisure development, and mental fitness  Purpose: other: cognitive skills, leisure awareness, social engagement     Name: Cyrus Kruse YOB: 1985   MR: [id number]      Facilitator: Recreational Therapist  Level of Participation: active  Quality of Participation: appropriate/pleasant, cooperative, and engaged  Interactions with others: appropriate  Mood/Affect: appropriate and brightens with interaction  Triggers (if applicable): n/a  Cognition: coherent/clear and goal directed  Progress: Moderate  Comments: Patients were gathered to learn and participate in \"Herd Mentality\". This activity works on cognitive skills, following directions, positive social interaction/teamwork, and promotes leisure awareness.    Pt was cooperative, pleasant, and appeared to enjoy activity.     Plan: continue with services      "

## 2025-07-13 NOTE — NURSING NOTE
"Upon assessment pt calm, cooperative, friendly. Pt denied anxiety, depression, pain, SI/HI and hallucinations. Personal strengths \"Exercising\" coping skills \"breathing and reading\" and goal \"get some rest\". Pt medication compliant. Pt seen by medical hospitalist tonight. Last BM five days ago, pt given scheduled miralax and linoclatide this evening. CIWA score 0 tonight at 2200.  "

## 2025-07-13 NOTE — CARE PLAN
"The patient's goals for the shift include \"get some rest    The clinical goals for the shift include maintain safety    Over the shift, the patient made progress towards care plan goals.  Problem: Pain - Adult  Goal: Verbalizes/displays adequate comfort level or baseline comfort level  Outcome: Progressing     Problem: Safety - Adult  Goal: Free from fall injury  Outcome: Progressing     Problem: Nutrition  Goal: Nutrient intake appropriate for maintaining nutritional needs  Outcome: Progressing     Problem: Chronic Conditions and Co-morbidities  Goal: Patient's chronic conditions and co-morbidity symptoms are monitored and maintained or improved  Outcome: Progressing       "

## 2025-07-13 NOTE — H&P
"Physician Certification & Recertification:  Certification/Re-Certification: INITIAL   I certify that the inpatient psychiatric hospital admission is medically necessary for:  treatment which could reasonably be expected to improve the patient's condition that could not be provided in a less restrictive setting   I estimate the period of hospitalization are necessary for treatment of this patient will be:  7 days    My plans for post hospital care for this patient are:  home        HISTORY OF PRESENT ILLNESS  HPI: Cyrus Kruse is a 40 y.o. male with a psychiatric hx of MDD and DIMA and eating disorder (12 years old) and medical history of constipation/IBS who presented to [facility] ER due to alcohol intoxication and increased depression with suicidal ideation. Per ER note:  Patient reported being at a bar where he was \"drinking too much\" and left a note. His father accompanied him to the ED and reported that police informed him that patient left a note describing SI w/ plan of hanging (report of method given by patient's mother). Patient admitted to worsening depression and anxiety but confirmed compliance with his medication regimen. He presented with a BAL of 188, noting occasional use of alcohol \"maybe once per month.\" Patient's father reported concerns about patient's SI and increase in depressive symptoms to ED staff. Patient denied SI/HI/AH/VH. EKG reviewed and showed sinus tachycardia without a prolonged QT and no ST abnormalities.  CMP unremarkable, TSH within normal limits, CBC unremarkable, Tylenol and aspirin levels are negative, ethanol 188, tox screen negative. Pt is medically cleared and admitted to [facility] for further psychiatric evaluation and treatment.   On evaluation, Pt admits that he was at bar and had too much alcohol and left note, but couldn't recall what he wrote in note.  Per ER note, pt's father reported that “patient left a note describing SI w/ plan of hanging (report of method given by " "patient's mother” Pt currently denies SI/plan/intention. Pt admits chronic depression since Teen, with intermittent worsening in the context of external stressor. 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   Pt endorses following sxs of depression and anxiety: depressed mood, poor energy and motivation and concentration,  excessive worries, anxiety, irritability, feeling worthless, shame, isolation. Pt reports fair appetite. Pt denies SI or HI or intention or plan. The pt is willing to receive help and to be admitted to psychiatry inpt program. Currently, Pt denies manic sxs, psychotic sxs. Pt denies use of illicit drugs. Pt reports drinks sometimes, 2-3 times a month, few beers a time.      Patient has one prior inpatient psychiatric hospitalization at [facility] at age 12 for few months due to depression and eating disorder. Pt reports eating disorder is recovered but his depression has been chronic and he has tried many medications (pt recalled Lithium, ablify, Seroquel, lutuda, Haldol, Prozac, Cymbalta, Adderall).  In the early days, he was seen by a psychiatrist Dr. Cedeno for 6-8 years dx'ed with bipolar disorder; currently he was treated with new psychiatrist Dr. Marbella Coffey for past 3 years who diagnosed pt with MDD and prescribed Lexapro (30mg/day) but he only takes 20mg/day, gabapentin 300mg TID and Klonpin 0.5mg PRN. Pt reports Lexapro helps with about 50% better.     Per EPAT note: “Patient was assessed when deemed clinically sober. He was A&Ox4 and remained withdrawn but cooperative. When asked what brought him to the ED, patient explained that he was at a bar, had a few drinks and left a note \"but it wasn't supposed to be serious.\" Police were called and showed up to his home for a wellness check before he presented to the ED. When asked about the content in the note, patient stated \"I don't remember.\" He denied SI/HI/AH/VH but admitted to " "feeling increasingly anxious and depressed due to an exam he has to take to become an SH[employer], which he has failed on 4 other occasions. He reported that he continues to care for himself and is eating/sleeping well but denies engaging in activities on a day-to-day basis. He reported that he often stays home, does not have social interactions and could not identify future plans or goals, aside from taking his exam. He reported that he works with a therapist \"every once in a while\" and meets with his psychiatrist every 3 months. Patient appeared to minimize his symptomology throughout the assessment.”    Per EPAT note for collateral: “Patient's mother, Natty, provided collateral information. She reported that he has a hx of \"all kinds of stuff,\" noting that he began having issues with his mental health at age 12 due to relentless bullying. She confirmed that patient went to a bar last night and left a note that reflected that he wanted to hang himself. She denied awareness of patient expressing suicidal ideation recently but noted that he has become increasingly irritable and depressed, which she believes may be due to his current medication. She also observed him become \"very agitated more often. He was banging against his head with his fists saying that he was trying to clear his head.\" She was concerned a few days ago \"because his mood flipped and he was extra happy. I didn't know if he was going to do something to himself.\" She believes that patient is often frustrated due to \"being 40 years old, still not working and living with his parents. He wants to do something he can be proud of.\" She also shared recent stressors due to patient's dog that is dying and inability to pass an exam. She reported belief that he \"has OCD behavior,\" briefly referencing his preferences with oral care utensils, but struggled to elaborate. She stated that patient \"can't handle hardly anything\" and believes that he would benefit from " further assessment. “      Mental Health Treatment History  Patient has one prior inpatient psychiatric hospitalization at [facility] at age 12 for few months due to depression and eating disorder. Pt reports eating disorder is recovered but his depression has been chronic and he has tried many medications (pt recalled Lithium, ablify, Seroquel, lutuda, Haldol, Prozac, Cymbalta, Adderall).  In the early days, he was seen by a psychiatrist Dr. Cedeno for 6-8 years dx'ed with bipolar disorder; currently he was treated with new psychiatrist Dr. Marbella Coffey for past 3 years who diagnosed pt with MDD and prescribed Lexapro (30mg/day) but he only takes 20mg/day, gabapentin 300mg TID and Klonpin 0.5mg PRN. Pt reports Lexapro helps with about 50% better.    Allergies[1]  OARRS    Risk History  Suicide: Suicidal Thoughts/Method/Intent/Plan: active suicidal thoughts resolved   Suicide Attempts/Preparations: None, denied  Number of Suicide Attempts: 0  Access to Firearms/Lethal Means: No guns in home and no access to firearms/lethal means  Non-Suicidal Self Injury: None, denied  Last [place] Risk Score:    Protective Factors: hopefulness/future orientation and fear of suicide    Violence: None, denied  Homicidal Thoughts/Method/Plan/Intent: None, denied  Homicidal Attempts/Preparations: None, denied  Number of Attempts: 0        Family History  Family History[2]   Pt reports Grandfather (mother side) received ECT tx for mental illness, Mother has depression hx    Social History  He reports that he has never smoked. He has never used smokeless tobacco. He reports that he does not use drugs.  He reports binge drinking a few times a month and drinks up to 5 drinks at a time.  No history of withdrawal or seizures.  Last drink was 2 days ago.  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 No  Are you a ? No       Abuse History    Trauma  "History  Victim, Perpetrator or Witness of Abuse: NO; No  significant physical, sexual, emotional abuse, neglect or trauma history was identified on initial assessment of the patient. This shall not be an active focus of treatment, but will continue to be reassessed throughout admission. (3)    Physical Abuse: No  Sexual Abuse: No  Verbal / Emotional Abuse / Bullying (+Cyber): No   Financial Abuse: No  Domestic Violence: No      Past Medical History    Medical History[3]    REVIEW OF SYSTEMS  ROS       OBJECTIVE INFORMATION  Objective        7/12/2025     4:28 PM 7/12/2025     4:30 PM 7/12/2025     4:53 PM 7/12/2025     9:51 PM 7/13/2025     6:23 AM 7/13/2025     7:32 AM 7/13/2025     7:33 AM   Vitals   Systolic 133 132 132 121 123 110 117   Diastolic 81 83 83 72 76 63 71   BP Location Left arm Left arm Left arm   Right arm    Heart Rate 75 71 71 85 93 65 81   Temp 35.9 °C (96.6 °F)  35.9 °C (96.6 °F)   36.5 °C (97.7 °F)    Resp 18  18   18    Height  1.753 m (5' 9\") 1.753 m (5' 9.02\")       Weight (lb)  179.23 179.23       BMI  26.47 kg/m2 26.46 kg/m2       BSA (m2)  1.99 m2 1.99 m2         Daily Weight  07/12/25 : 81.3 kg (179 lb 3.7 oz)                                                                                                                   Mental Status Exam:   General: 39yo man, admitted for worsening of depression    Appearance: Appears stated age. Appropriately groomed and dressed.   Attitude: cooperative.   Behavior: cooperative,    Motor Activity: No agitation or retardation. No EPS/TD.   Speech: clear, fluent, normal volume, spontaneous, fluent. No pressured sleech   Mood: sad   Affect: anxious, depressed   Thought Process: Organized, linear, goal directed. No flight of ideations.   Thought Content: denies suicidal ideation/homicidal ideation, no delusions    Thought Perception: Does not endorse auditory or visual hallucinations, does not appear to be responding to hallucinatory stimuli. "   Cognition: Alert, oriented to time and place. Able to provide history, and timeline of events well.    Insight: fair   Judgment: fair.        FUNCTIONAL ESTIMATES  Estimate of Intelligence:  average,    Estimate of Capacity for Activities of Daily Living:  independent,      CRANIAL NERVE EXAM  ·  Cranial Nerves: II, III, IV, VI: vision grossly within functional limits. PERRLA. Extraocular movements are grossly intact  ·  Cranial Nerves: V: facial sensation intact bilaterally  ·  Cranial Nerves: VII: smile symmetric  ·  Cranial Nerves: VIII: hearing grossly intact bilaterally  ·  Cranial Nerves: IX, X: phonation normal. palate and uvula elevate symmetrically  ·  Cranial Nerves: XI: shoulder shrug strong, equal bilaterally  ·  Cranial Nerves: XII: tongue midline, symmetrical  ·  Reflexes: Reflexes grossly intact. No clonus  ·  Sensation: sensation is grossly intact to pain and light touch.  ·  Motor: Muscle tone within functional limits. Strength is 5/5 x4  ·  Cerebellar: finger to nose touch within normal limits. Gait is steady with a normal base. Coordination grossly intact    CURRENT MEDICATIONS  Scheduled medications  Scheduled Medications[4]    Continuous Medications[5]  PRN medications  PRN Medications[6]    LABS   Results for orders placed or performed during the hospital encounter of 07/11/25 (from the past 96 hours)   Drug Screen, Urine   Result Value Ref Range    Amphetamine Screen, Urine Presumptive Negative Presumptive Negative    Barbiturate Screen, Urine Presumptive Negative Presumptive Negative    Benzodiazepines Screen, Urine Presumptive Negative Presumptive Negative    Cannabinoid Screen, Urine Presumptive Negative Presumptive Negative    Cocaine Metabolite Screen, Urine Presumptive Negative Presumptive Negative    Fentanyl Screen, Urine Presumptive Negative Presumptive Negative    Opiate Screen, Urine Presumptive Negative Presumptive Negative    Oxycodone Screen, Urine Presumptive Negative  Presumptive Negative    PCP Screen, Urine Presumptive Negative Presumptive Negative    Methadone Screen, Urine Presumptive Negative Presumptive Negative   CBC and Auto Differential   Result Value Ref Range    WBC 7.0 4.4 - 11.3 x10*3/uL    nRBC 0.0 0.0 - 0.0 /100 WBCs    RBC 4.95 4.50 - 5.90 x10*6/uL    Hemoglobin 15.7 13.5 - 17.5 g/dL    Hematocrit 46.3 41.0 - 52.0 %    MCV 94 80 - 100 fL    MCH 31.7 26.0 - 34.0 pg    MCHC 33.9 32.0 - 36.0 g/dL    RDW 11.6 11.5 - 14.5 %    Platelets 278 150 - 450 x10*3/uL    Neutrophils % 58.9 40.0 - 80.0 %    Immature Granulocytes %, Automated 0.3 0.0 - 0.9 %    Lymphocytes % 30.0 13.0 - 44.0 %    Monocytes % 6.5 2.0 - 10.0 %    Eosinophils % 3.7 0.0 - 6.0 %    Basophils % 0.6 0.0 - 2.0 %    Neutrophils Absolute 4.11 1.20 - 7.70 x10*3/uL    Immature Granulocytes Absolute, Automated 0.02 0.00 - 0.70 x10*3/uL    Lymphocytes Absolute 2.09 1.20 - 4.80 x10*3/uL    Monocytes Absolute 0.45 0.10 - 1.00 x10*3/uL    Eosinophils Absolute 0.26 0.00 - 0.70 x10*3/uL    Basophils Absolute 0.04 0.00 - 0.10 x10*3/uL   Comprehensive Metabolic Panel   Result Value Ref Range    Glucose 93 74 - 99 mg/dL    Sodium 141 136 - 145 mmol/L    Potassium 3.6 3.5 - 5.3 mmol/L    Chloride 105 98 - 107 mmol/L    Bicarbonate 24 21 - 32 mmol/L    Anion Gap 16 10 - 20 mmol/L    Urea Nitrogen 16 6 - 23 mg/dL    Creatinine 1.16 0.50 - 1.30 mg/dL    eGFR 82 >60 mL/min/1.73m*2    Calcium 9.0 8.6 - 10.3 mg/dL    Albumin 4.5 3.4 - 5.0 g/dL    Alkaline Phosphatase 80 33 - 120 U/L    Total Protein 7.4 6.4 - 8.2 g/dL    AST 28 9 - 39 U/L    Bilirubin, Total 0.6 0.0 - 1.2 mg/dL    ALT 16 10 - 52 U/L   Acute Toxicology Panel, Blood   Result Value Ref Range    Acetaminophen <10.0 10.0 - 30.0 ug/mL    Salicylate  <3 4 - 20 mg/dL    Alcohol 188 (H) <=10 mg/dL   TSH with reflex to Free T4 if abnormal   Result Value Ref Range    Thyroid Stimulating Hormone 1.14 0.44 - 3.98 mIU/L   Lavender Top   Result Value Ref Range    Extra  "Tube Hold for add-ons.    Electrocardiogram, 12-lead   Result Value Ref Range    Ventricular Rate 105 BPM    Atrial Rate 105 BPM    SD Interval 206 ms    QRS Duration 92 ms    QT Interval 338 ms    QTC Calculation(Bazett) 446 ms    P Axis 62 degrees    R Axis 59 degrees    T Axis 48 degrees    QRS Count 17 beats    Q Onset 219 ms    P Onset 116 ms    P Offset 172 ms    T Offset 388 ms    QTC Fredericia 407 ms      Imaging  No results found.    Cardiology, Vascular, and Other Imaging  Electrocardiogram, 12-lead  Result Date: 7/12/2025  Sinus tachycardia Otherwise normal ECG When compared with ECG of 23-NOV-2021 14:48, ST no longer elevated in Inferior leads      [id number]    RADIOLOGY Results:   [unfilled]     EKG (QTc) ED:  Encounter Date: 07/11/25   Electrocardiogram, 12-lead   Result Value    Ventricular Rate 105    Atrial Rate 105    SD Interval 206    QRS Duration 92    QT Interval 338    QTC Calculation(Bazett) 446    P Axis 62    R Axis 59    T Axis 48    QRS Count 17    Q Onset 219    P Onset 116    P Offset 172    T Offset 388    QTC Fredericia 407    Narrative    Sinus tachycardia  Otherwise normal ECG  When compared with ECG of 23-NOV-2021 14:48,  ST no longer elevated in Inferior leads            Impression:  Cyrus Kruse is a 40 y.o. male with a psychiatric hx of MDD and DIMA and eating disorder (12 years old) and medical history of constipation/IBS who presented to [facility] ER due to alcohol intoxication and increased depression with suicidal ideation. Per ER note:  Patient reported being at a bar where he was \"drinking too much\" and left a note. His father accompanied him to the ED and reported that police informed him that patient left a note describing SI w/ plan of hanging (report of method given by patient's mother). Patient admitted to worsening depression and anxiety but confirmed compliance with his medication regimen. He presented with a BAL of 188, noting occasional use of alcohol " "\"maybe once per month.\" Patient's father reported concerns about patient's SI and increase in depressive symptoms to ED staff. Patient denied SI/HI/AH/VH. EKG reviewed and showed sinus tachycardia without a prolonged QT and no ST abnormalities.  CMP unremarkable, TSH within normal limits, CBC unremarkable, Tylenol and aspirin levels are negative, ethanol 188, tox screen negative. Pt is medically cleared and admitted to [facility] for further psychiatric evaluation and treatment.   Dx  MDD, recurrent, severe; r/o Bipolar disorder, or MDD with mixed manic features.   Persistent depression dysthymic disorder  SI in the context of alcohol intoxication. currently denies, no prior hx of suicide attempt.     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, and dog  recently  Medical: IBS/constipation type    PLAN  - Admit to [facility] Inpatient Psychiatry Unit  - Restrict to [place]  - Legal Status: INVOLUNTARY  - Suicide/Behavioral/Elopement Precautions  - Collateral from outside resource  - General PRNs: Acetaminophen prn pain, MoM prn constipation, Maalox prn dyspepsia  - DVT prophylaxis: Ambulatory  - Diet: Regular  - Group/Milieu & Music therapy - Coping Strategies, Social Skills  - MUSIC THERAPY CONSULT - Coping  - OT CONSULT - Safety Assessment  - INTERNAL MEDICINE CONSULT - medical management  - SW CONSULT - COLLATERAL    Continue psychiatric inpt program  Labs studies:  BMP, LFT, CBC, TSH, UA, UTox done.  I have reviewed these labs in the emr and or chart.  Continue to monitor patient's response to treatment, including medications.  Monitor for emerging side effects and focus on safety issues and improved thought organization / emotional control.  Encourage compliance with current medication treatment.    Psychotropic Medications recommendations   Resume Lexapro 20mg po daily, pt reports " he has been on it for 3 years, partially response (about 50%)  Start Bupropion for combination tx for chronic depression    Starting bupropion EX 150mg po daily 7/13/2025  Consider augmenting option with Olanzapine for refractory depression, and mixed sxs.     Pros/cons/alternatives were discussed with pt who voiced understanding and agreed on tx plan above    Therapy:   Group/Milieu & Music therapy - Coping Strategies, Social Skills; group and 1:1 options; programming, relaxation techniques, coping skills, music, art.    Consults:  OT consult: safety assessment; cognitive assessment  Internal medicine- medical management  Social work consult: Coping, disposition planning; Follow up outpatient appointments    Medical issues- Medical team to follow, Appreciate Consults    DISPOSITION:   -Collateral from outside resource.  -SW consulted to assist, collateral, psychosocial issues, and disposition  -ELOS 3-7 days  -outpt referral for psychotherapy  -outpt referral for psychiatric followup      Goal of inpatient psychiatry includes:  -symptom relief and coordination of care to promote recovery by daily assessment of risk  - collaboration of family/friends, continuing support plans are developed in preparation for discharge  -prevention of harm/destruction of self, others, and/or property  -prevention of of exacerbation of psychiatric symptoms  -management of medication with close monitoring of effects and control of side effects  -clinical interventions to address lack of impulse control OR SI,  HI, psychotic state, decreased functioning, failure to take medication resulting in symptom increase  - group therapy and psychoeducational groups daily  - SW consult dc planning    - The patient's admitting diagnosis, average indicated length of stay, risks and benefits of medication management the duration of need for medication treatment and post discharge plan of referral for follow up with mental health care, which will  include referral to outpatient psychiatry/therapy, was reviewed with the patient, at the time of this admission.   - Safety counseling with emphasis on when and how to access 24 hour emergency services in mental as well as medical health (Mobile Crisis, 911 or coming to the nearest ER) was reviewed with the patient at the time of admission and will be reiterated during inpatient stay and at the time of discharge. Referral will be made to return to medication management, therapist, case management as is indicated.  - Discharge to community once psychiatrically stable with outpatient follow up       Medication Consent  Medication Consent: risks, benefits, side effects reviewed for all ordered medications and patient expressed understanding; patient consent obtained        Mei Shoemaker MD PhD           [1]   Allergies  Allergen Reactions    Clomipramine Other     Suicidal ideation, worsening depression   [2] No family history on file.  [3]   Past Medical History:  Diagnosis Date    Personal history of other mental and behavioral disorders     History of depression   [4] buPROPion XL, 150 mg, oral, Daily  escitalopram, 20 mg, oral, Daily  folic acid, 1 mg, oral, Daily  gabapentin, 300 mg, oral, TID  linaCLOtide, 144 mcg, oral, Daily before breakfast  melatonin, 3 mg, oral, Daily  multivitamin with minerals, 1 tablet, oral, Daily  polyethylene glycol, 17 g, oral, Daily  thiamine, 100 mg, oral, Daily  [5]    [6] PRN medications: acetaminophen, clonazePAM, [Held by provider] diazePAM, diphenhydrAMINE **OR** diphenhydrAMINE, docusate sodium, haloperidol **OR** haloperidol lactate, hydrOXYzine HCL, LORazepam, traZODone

## 2025-07-14 PROBLEM — F41.1 GENERALIZED ANXIETY DISORDER: Status: ACTIVE | Noted: 2025-07-14

## 2025-07-14 PROBLEM — F10.20 ALCOHOL USE DISORDER, MODERATE, DEPENDENCE (MULTI): Status: ACTIVE | Noted: 2025-07-14

## 2025-07-14 PROBLEM — F33.2 SEVERE EPISODE OF RECURRENT MAJOR DEPRESSIVE DISORDER, WITHOUT PSYCHOTIC FEATURES (MULTI): Status: ACTIVE | Noted: 2025-07-14

## 2025-07-14 LAB — HOLD SPECIMEN: NORMAL

## 2025-07-14 PROCEDURE — 2500000001 HC RX 250 WO HCPCS SELF ADMINISTERED DRUGS (ALT 637 FOR MEDICARE OP): Performed by: PSYCHIATRY & NEUROLOGY

## 2025-07-14 PROCEDURE — 97165 OT EVAL LOW COMPLEX 30 MIN: CPT | Mod: GO

## 2025-07-14 PROCEDURE — 99233 SBSQ HOSP IP/OBS HIGH 50: CPT | Performed by: PSYCHIATRY & NEUROLOGY

## 2025-07-14 PROCEDURE — 1240000001 HC SEMI-PRIVATE BH ROOM DAILY

## 2025-07-14 PROCEDURE — 97150 GROUP THERAPEUTIC PROCEDURES: CPT | Mod: GO,CO

## 2025-07-14 PROCEDURE — 2500000001 HC RX 250 WO HCPCS SELF ADMINISTERED DRUGS (ALT 637 FOR MEDICARE OP): Performed by: NURSE PRACTITIONER

## 2025-07-14 RX ADMIN — GABAPENTIN 300 MG: 300 CAPSULE ORAL at 15:39

## 2025-07-14 RX ADMIN — CLONAZEPAM 0.5 MG: 0.5 TABLET ORAL at 07:24

## 2025-07-14 RX ADMIN — THIAMINE HCL TAB 100 MG 100 MG: 100 TAB at 08:27

## 2025-07-14 RX ADMIN — Medication 1 TABLET: at 08:27

## 2025-07-14 RX ADMIN — ESCITALOPRAM OXALATE 20 MG: 20 TABLET ORAL at 08:27

## 2025-07-14 RX ADMIN — GABAPENTIN 300 MG: 300 CAPSULE ORAL at 08:27

## 2025-07-14 RX ADMIN — FOLIC ACID 1 MG: 1 TABLET ORAL at 08:27

## 2025-07-14 RX ADMIN — LINACLOTIDE 144 MCG: 72 CAPSULE, GELATIN COATED ORAL at 06:31

## 2025-07-14 RX ADMIN — GABAPENTIN 300 MG: 300 CAPSULE ORAL at 20:35

## 2025-07-14 ASSESSMENT — LIFESTYLE VARIABLES
TOTAL SCORE: 3
TREMOR: NO TREMOR
AGITATION: NORMAL ACTIVITY
ORIENTATION AND CLOUDING OF SENSORIUM: ORIENTED AND CAN DO SERIAL ADDITIONS
TOTAL SCORE: 0
AGITATION: NORMAL ACTIVITY
VISUAL DISTURBANCES: NOT PRESENT
PAROXYSMAL SWEATS: NO SWEAT VISIBLE
AUDITORY DISTURBANCES: NOT PRESENT
VISUAL DISTURBANCES: NOT PRESENT
ANXIETY: NO ANXIETY, AT EASE
HEADACHE, FULLNESS IN HEAD: NOT PRESENT
ORIENTATION AND CLOUDING OF SENSORIUM: ORIENTED AND CAN DO SERIAL ADDITIONS
NAUSEA AND VOMITING: NO NAUSEA AND NO VOMITING
HEADACHE, FULLNESS IN HEAD: NOT PRESENT
ORIENTATION AND CLOUDING OF SENSORIUM: ORIENTED AND CAN DO SERIAL ADDITIONS
VISUAL DISTURBANCES: NOT PRESENT
TREMOR: NO TREMOR
VISUAL DISTURBANCES: NOT PRESENT
HEADACHE, FULLNESS IN HEAD: NOT PRESENT
ANXIETY: MILDLY ANXIOUS
NAUSEA AND VOMITING: NO NAUSEA AND NO VOMITING
ANXIETY: NO ANXIETY, AT EASE
TREMOR: NO TREMOR
AGITATION: NORMAL ACTIVITY
TREMOR: NO TREMOR
NAUSEA AND VOMITING: NO NAUSEA AND NO VOMITING
HEADACHE, FULLNESS IN HEAD: VERY MILD
ANXIETY: NO ANXIETY, AT EASE
TREMOR: NO TREMOR
PAROXYSMAL SWEATS: BARELY PERCEPTIBLE SWEATING, PALMS MOIST
AUDITORY DISTURBANCES: NOT PRESENT
TOTAL SCORE: 0
TOTAL SCORE: 0
AUDITORY DISTURBANCES: NOT PRESENT
ORIENTATION AND CLOUDING OF SENSORIUM: ORIENTED AND CAN DO SERIAL ADDITIONS
PAROXYSMAL SWEATS: NO SWEAT VISIBLE
AUDITORY DISTURBANCES: NOT PRESENT
PAROXYSMAL SWEATS: NO SWEAT VISIBLE
ORIENTATION AND CLOUDING OF SENSORIUM: ORIENTED AND CAN DO SERIAL ADDITIONS
PAROXYSMAL SWEATS: NO SWEAT VISIBLE
AUDITORY DISTURBANCES: NOT PRESENT
NAUSEA AND VOMITING: NO NAUSEA AND NO VOMITING
HEADACHE, FULLNESS IN HEAD: NOT PRESENT
VISUAL DISTURBANCES: NOT PRESENT
TOTAL SCORE: 0
ANXIETY: NO ANXIETY, AT EASE
NAUSEA AND VOMITING: NO NAUSEA AND NO VOMITING

## 2025-07-14 ASSESSMENT — PAIN SCALES - GENERAL
PAINLEVEL_OUTOF10: 0 - NO PAIN
PAINLEVEL_OUTOF10: 0 - NO PAIN

## 2025-07-14 ASSESSMENT — PAIN - FUNCTIONAL ASSESSMENT
PAIN_FUNCTIONAL_ASSESSMENT: 0-10
PAIN_FUNCTIONAL_ASSESSMENT: 0-10

## 2025-07-14 NOTE — GROUP NOTE
"Group Topic: Community   Group Date: 7/14/2025  Start Time: 0730  End Time: 0800  Facilitators: JENNIFER Narayanan   Department: [facility] REHAB THERAPY VIRTUAL    Number of Participants: 6   Group Focus: check in and community group  Treatment Modality: Recreation Therapy, JOSE Volunteer   Interventions utilized were: JOSE Visit, exploration, patient education, story telling, and support  Purpose: Community Reintegration, insight or knowledge and self-care    Name: Cyrus Kruse YOB: 1985   MR: [id number]      Facilitator: Recreational Therapist, JOSE Volunteer   Level of Participation: minimal, arrived late   Quality of Participation: passive, quiet, and withdrawn  Interactions with others: none, minimal  Mood/Affect: blunted, closed / guarded, and flat  Triggers (if applicable): N/A  Cognition: selectively attentive   Progress: Minimal  Comments: This session involved a volunteer from JOSE ([employer]) providing community based resources for participants to utilize post discharge. The group provided some education on [facility] services in addition to being a support group for those to share their experiences dealing with mental health, being hospitalized, and any additional feedback.      Mr. Kruse joined the session shortly after it got started. He was limited with his engagement and refused to complete the verbal \"check in\".     Plan: continue with services      "

## 2025-07-14 NOTE — NURSING NOTE
"Pt interview  [facility]  Pt is watching TV and eating his snack  Pt is dressed in his own clothes with hospital pants He stated his day was \"Ok \"  and he did go to groups today  Pt stated his goal \" get rest\"  his strength \" I like to be around others\"  and his coping skill \" I deep breath\"  Pt rate his anxiety 3/10  depression 5/10  and denied everything else at this time  Pt is appropriate with his answers to the questions at this time   "

## 2025-07-14 NOTE — PROGRESS NOTES
"Occupational Therapy     REHAB Therapy Assessment & Treatment    Patient Name: Cyrus Kruse  MRN: [id number]  Today's Date: 7/14/2025    Occupational Therapy Initial Evaluation- [facility]   Time In: 0937 Time Out: 0952  Date of OT Referral: 7/12/25   Admission Diagnosis: Depression/SI   Reason for Referral: Impaired coping  General Information   Past Medical History/History of Present Illness: Presetned to ED due to  alcohol intoxication and increased depression/ SI. History of MDD, DIMA and eating disorder at 12 yrs old. Parents express concerns of pt's behavior, frequent mood changes, and irritability/depression- all increasing lately.    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 Multiple attempts at passing board exam    Employment Status: Unemployed   Leisure Interests: Unable to ID  Psychosocial/Cognition Assessment   Orientation: A&Ox4   Attention: WFL   Mood: Depressed, dismissive     Patient Identified Goals-Short Term: Unable to ID short term goal Long Term: Pass test, find a job  Perceptual/Communication Skills   Perception (inattention, delusions, hallucinations, suicidal, etc): Denies current SI   Social Skills/Behavior: States he is \"not a social person\", but will frequently do things that involve being around others. Like hanging out oat bars, or other activities that he can passively socialize.   Basic Functional Mobility   Transfer Status: independent   Ambulatory Status: independent   Balance: WNL   Endurance: WNL  Activities of Daily Living   Grooming/Hygiene: independent   Dressing: " independent   Bathing: independent   Toileting: independent  Instrumental Activities of Daily Living   Driving/ transportation: (+) driving    Medication Management/Compliance: Reports compliant with meds, no barriers identified   Patient Reported Daily Routine/Responsibility: Lacks productive daily routine, difficulty following though on responsibilities/ positive habits.   Emotional/Mental Health Management   Patient Identified Coping Skills- Positive: Unable to ID   Knowledge of Illness/Emotions: Decreased awareness, does ID safety concerns with his current illness.    Stress Management: Impaired   Depression/Anxiety Management: Seeking support and treatment. ID coping skills and stress management techniques.     Pt agreeable to OT POC for attendance of 5x/week group sessions and/ or 1:1 sessions to address the goals established above prior to discharge.          Encounter Problems       Encounter Problems (Active)       OT Goals       Pt will ID 2-3 effective ways to distract from crisis and ID stressors/ personal symptoms of stress in order to improve management of stress during daily activities.         Start:  07/14/25    Expected End:  08/11/25            Pt will improve ability to ID and express emotions while accepting and tolerating all emotions, in order to increase awareness of positive emotions.         Start:  07/14/25    Expected End:  08/11/25            Pt will exhibit understanding of the symptoms, treatment, and course of their diagnosis, demonstrating ability to comply and participate in treatment plan         Start:  07/14/25    Expected End:  08/11/25            Pt will ID 2 community resources/programs to join/attend after D/C to improve their support system        Start:  07/14/25    Expected End:  08/11/25            Pt will explore and ID 1-2 strategies to manage stressors/symptoms of illness/ grief more effectively prior to discharge.         Start:  07/14/25    Expected End:  08/11/25

## 2025-07-14 NOTE — CARE PLAN
"The patient's goals for the shift include \"attend groups, do what I need to do\"    The clinical goals for the shift include maintain safety    Over the shift, the patient did make progress toward the following goals. Barriers to progression include acuteness of illness. Recommendations to address these barriers include educate patient about safety on the unit and maintain Q 15 minute rounds for patient.      Problem: Safety - Adult  Goal: Free from fall injury  Outcome: Progressing     Problem: Discharge Planning  Goal: Discharge to home or other facility with appropriate resources  Outcome: Progressing     Problem: Nutrition  Goal: Nutrient intake appropriate for maintaining nutritional needs  Outcome: Progressing     Problem: Sensory Perceptual Alteration as Evidenced by  Goal: Patient/Family participate in treatment and discharge plans  Outcome: Progressing     Problem: Sensory Perceptual Alteration as Evidenced by  Goal: Participates in unit activities  Outcome: Progressing     Problem: Ineffective Coping  Goal: Identifies ineffective coping skills  Outcome: Progressing     Problem: Ineffective Coping  Goal: Identifies healthy coping skills  Outcome: Progressing       "

## 2025-07-14 NOTE — NURSING NOTE
Pt took his night time medications  Pt watched TV and then went to bed Pt did not require any prn medications to help sleep  Pt had an uneventful night  Pt slept all night long

## 2025-07-14 NOTE — PROGRESS NOTES
"Occupational Therapy     REHAB Therapy Assessment & Treatment    Patient Name: Cyrus Kruse  MRN: [id number]  Today's Date: 7/14/2025      Activity Assessment:   Emotion ID Through Music Group: [id number]  Cognitive Task and Team Collaboration Group: [id number]    2/2 Groups attended     Following OT eval and in collaboration with the OTR/L, pt attends and participates well in both above groups. Pt is an active participant during music group, following along with lyrics and appropriately sharing his thoughts between songs being played. Pt is attentive throughout and appropriately shares his current stressors as \"trying to get through some school stuff currently\" During cog task group, pt continues to demo improving confidence when interacting with peers and demos improved ability to express his thoughts/feelings without hesitation during the task. Pt with G understanding of how this activity can be a positive way to distract from stress. Pt with G progress toward OT Goals as evidenced above. Pt would benefit from continued OT services in order to improve overall self-esteem, personal confidence and positive supports for safe transition at discharge.        Encounter Problems       Encounter Problems (Active)       OT Goals       Pt will ID 2-3 effective ways to distract from crisis and ID stressors/ personal symptoms of stress in order to improve management of stress during daily activities.         Start:  07/14/25    Expected End:  08/11/25            Pt will improve ability to ID and express emotions while accepting and tolerating all emotions, in order to increase awareness of positive emotions.         Start:  07/14/25    Expected End:  08/11/25            Pt will exhibit understanding of the symptoms, treatment, and course of their diagnosis, demonstrating ability to comply and participate in treatment plan         Start:  07/14/25    Expected End:  08/11/25            Pt will ID 2 community resources/programs " to join/attend after D/C to improve their support system        Start:  07/14/25    Expected End:  08/11/25            Pt will explore and ID 1-2 strategies to manage stressors/symptoms of illness/ grief more effectively prior to discharge.         Start:  07/14/25    Expected End:  08/11/25                     Additional Comments:    BARTHOLOMEW collaborated with patients nurse and charge nurse throughout the day to provide appropriate support and encouragement to attend groups. Pt up on unit when BARTHOLOMEW left last group of the day. All needs met.

## 2025-07-14 NOTE — ASSESSMENT & PLAN NOTE
Plan: 1) Lexapro 20 mg Qdaily           *2) Discontinue Wellbutrin  mg Qdaily (pt does not wish to take it due to lake of efficacy in the past)           3) Melatonin 3 mg at bedtime           4) Group and milieu therapy

## 2025-07-14 NOTE — GROUP NOTE
"Group Topic: Music Therapy   Group Date: 7/14/2025  Start Time: 0940  End Time: 1030  Facilitators: Marilee Johns   Department: [facility] EXPRESSIVE THER VIRTUAL    Number of Participants: 7   Group Focus: expressive outlet, feeling awareness/expression, music therapy, and opportunity for choice/control  Treatment Modality: Music Therapy  Interventions Utilized were: active music engagement, empathic listening/validating emotions, passive music engagement, and sharing/discussion    Participants were invited to engage in lyrics analysis of \"Hand In My Pocket\" by Melina Enriquez, followed by opportunities to choose songs for the group to listen to. Pts also encouraged to sing or play instruments along to their own/peers' selections.  Name: Cyrus Kruse YOB: 1985   MR: [id number]      Level of Participation: moderate  Quality of Participation: appropriate/pleasant, attentive, and quiet  Interactions with others: appropriate  Mood/Affect: blunted and brightens with interaction  Cognition, Pre Treatment: attentive  Cognition, Post Treatment: coherent/clear  Progress: Moderate  Plan: continue with services    Pt was quietly engaged throughout, primarily choosing to actively listen to music and peers, but he did choose a song with some encouragement.  "

## 2025-07-14 NOTE — PROGRESS NOTES
"Cyrus Kruse is a 40 y.o. year old male patient who is on [facility] admission day 2.      Subjective   Cyrus Kruse is a 40 y.o. year old male patient who was personally seen and interviewed, and discussed in morning team rounds. The patient was interviewed alone at the end of the hallway (interviewed while sitting in a chair), and was easily engaged and cooperative. This afternoon, Cyrus reports feeling \"pretty good\" and currently rates his depression at a 5 out of 10. No current suicidal ideation or suicide plans were elicited. He also rates his anxiety at a 3 out of 10. No hallucinations or paranoia were endorsed or noted.        Cyrus slept 8 hours last night (unbroken).           Objective   Mental Status Exam:   General: Appropriately groomed and dressed in casual/hospital attire.   Appearance: Appears stated age.   Attitude: Calm, cooperative.   Behavior: Appropriate eye contact.   Motor Activity: No agitation or retardation. No EPS/TD. Normal gait and station. Normal muscle tone and bulk.   Speech: Regular rate, rhythm, volume and tone, spontaneous, fluent. Non-pressured.   Mood: \"Pretty good\"   Affect: Neutral.   Thought Process: Organized, and goal directed.   Thought Content: Does not currently endorse suicidal ideation or any suicide plans.   Does not endorse homicidal ideation.  No overt delusions or paranoia elicited.    Thought Perception: Does not endorse auditory or visual hallucinations, does not appear to be responding to hallucinatory stimuli.   Cognition: Alert, oriented x 3. No deficits noted. Adequate fund of knowledge. No deficit in recent and remote memory. No deficits in attention, concentration or language.   Insight: Poor-to-Fair, as patient recognizes symptoms of illness and need for recommended treatments.    Judgment: Poor-to-Fair, as patient can make reasonable decisions about ordinary activities of daily living and necessary medical care recommendations.           LABS:  Results for " orders placed or performed during the hospital encounter of 07/11/25 (from the past 96 hours)   Drug Screen, Urine   Result Value Ref Range    Amphetamine Screen, Urine Presumptive Negative Presumptive Negative    Barbiturate Screen, Urine Presumptive Negative Presumptive Negative    Benzodiazepines Screen, Urine Presumptive Negative Presumptive Negative    Cannabinoid Screen, Urine Presumptive Negative Presumptive Negative    Cocaine Metabolite Screen, Urine Presumptive Negative Presumptive Negative    Fentanyl Screen, Urine Presumptive Negative Presumptive Negative    Opiate Screen, Urine Presumptive Negative Presumptive Negative    Oxycodone Screen, Urine Presumptive Negative Presumptive Negative    PCP Screen, Urine Presumptive Negative Presumptive Negative    Methadone Screen, Urine Presumptive Negative Presumptive Negative   CBC and Auto Differential   Result Value Ref Range    WBC 7.0 4.4 - 11.3 x10*3/uL    nRBC 0.0 0.0 - 0.0 /100 WBCs    RBC 4.95 4.50 - 5.90 x10*6/uL    Hemoglobin 15.7 13.5 - 17.5 g/dL    Hematocrit 46.3 41.0 - 52.0 %    MCV 94 80 - 100 fL    MCH 31.7 26.0 - 34.0 pg    MCHC 33.9 32.0 - 36.0 g/dL    RDW 11.6 11.5 - 14.5 %    Platelets 278 150 - 450 x10*3/uL    Neutrophils % 58.9 40.0 - 80.0 %    Immature Granulocytes %, Automated 0.3 0.0 - 0.9 %    Lymphocytes % 30.0 13.0 - 44.0 %    Monocytes % 6.5 2.0 - 10.0 %    Eosinophils % 3.7 0.0 - 6.0 %    Basophils % 0.6 0.0 - 2.0 %    Neutrophils Absolute 4.11 1.20 - 7.70 x10*3/uL    Immature Granulocytes Absolute, Automated 0.02 0.00 - 0.70 x10*3/uL    Lymphocytes Absolute 2.09 1.20 - 4.80 x10*3/uL    Monocytes Absolute 0.45 0.10 - 1.00 x10*3/uL    Eosinophils Absolute 0.26 0.00 - 0.70 x10*3/uL    Basophils Absolute 0.04 0.00 - 0.10 x10*3/uL   Comprehensive Metabolic Panel   Result Value Ref Range    Glucose 93 74 - 99 mg/dL    Sodium 141 136 - 145 mmol/L    Potassium 3.6 3.5 - 5.3 mmol/L    Chloride 105 98 - 107 mmol/L    Bicarbonate 24 21 - 32  mmol/L    Anion Gap 16 10 - 20 mmol/L    Urea Nitrogen 16 6 - 23 mg/dL    Creatinine 1.16 0.50 - 1.30 mg/dL    eGFR 82 >60 mL/min/1.73m*2    Calcium 9.0 8.6 - 10.3 mg/dL    Albumin 4.5 3.4 - 5.0 g/dL    Alkaline Phosphatase 80 33 - 120 U/L    Total Protein 7.4 6.4 - 8.2 g/dL    AST 28 9 - 39 U/L    Bilirubin, Total 0.6 0.0 - 1.2 mg/dL    ALT 16 10 - 52 U/L   Acute Toxicology Panel, Blood   Result Value Ref Range    Acetaminophen <10.0 10.0 - 30.0 ug/mL    Salicylate  <3 4 - 20 mg/dL    Alcohol 188 (H) <=10 mg/dL   TSH with reflex to Free T4 if abnormal   Result Value Ref Range    Thyroid Stimulating Hormone 1.14 0.44 - 3.98 mIU/L   Lavender Top   Result Value Ref Range    Extra Tube Hold for add-ons.    Electrocardiogram, 12-lead   Result Value Ref Range    Ventricular Rate 105 BPM    Atrial Rate 105 BPM    OH Interval 206 ms    QRS Duration 92 ms    QT Interval 338 ms    QTC Calculation(Bazett) 446 ms    P Axis 62 degrees    R Axis 59 degrees    T Axis 48 degrees    QRS Count 17 beats    Q Onset 219 ms    P Onset 116 ms    P Offset 172 ms    T Offset 388 ms    QTC Fredericia 407 ms        Last Recorded Vitals  Visit Vitals  /85 (BP Location: Right arm, Patient Position: Sitting)   Pulse 70   Temp 36.2 °C (97.2 °F) (Temporal)   Resp 18        Intake/Output last 3 Shifts:  No intake/output data recorded.    Relevant Results  Scheduled medications  Scheduled Medications[1]  Continuous medications  Continuous Medications[2]  PRN medications  PRN Medications[3]               Assessment/Plan   Assessment & Plan  Severe episode of recurrent major depressive disorder, without psychotic features (Multi)  Plan: 1) Lexapro 20 mg Qdaily           *2) Discontinue Wellbutrin  mg Qdaily (pt does not wish to take it due to lake of efficacy in the past)           3) Melatonin 3 mg at bedtime           4) Group and milieu therapy    Generalized anxiety disorder  Plan: 1) see above    Alcohol use disorder, moderate,  dependence (Multi)  Plan: 1) Refer to outpatient treatment program.    JAKE Bryant MD         [1] buPROPion XL, 150 mg, oral, Daily  escitalopram, 20 mg, oral, Daily  folic acid, 1 mg, oral, Daily  gabapentin, 300 mg, oral, TID  linaCLOtide, 144 mcg, oral, Daily before breakfast  melatonin, 3 mg, oral, Daily  multivitamin with minerals, 1 tablet, oral, Daily  polyethylene glycol, 17 g, oral, Daily  thiamine, 100 mg, oral, Daily    [2]    [3] PRN medications: acetaminophen, clonazePAM, [Held by provider] diazePAM, diphenhydrAMINE **OR** diphenhydrAMINE, docusate sodium, haloperidol **OR** haloperidol lactate, hydrOXYzine HCL, LORazepam, traZODone

## 2025-07-14 NOTE — GROUP NOTE
"Group Topic: Dialectical Behavioral Therapy - Distress Tolerance   Group Date: 7/14/2025  Start Time: 1400  End Time: 1500  Facilitators: JENNIFER Narayanan   Department: [facility] REHAB THERAPY VIRTUAL    Number of Participants: 7   Group Focus: stress/crisis management, acceptance, coping skills, and feeling awareness/expression  Treatment Modality: Recreation Therapy  Interventions utilized were: DBT-DT-S.T.O.P. and Turning the Mind Skills, clarification, confrontation, exploration, patient education, and support  Purpose: coping skills, maladaptive thinking, feelings, and insight or knowledge    Name: Cyrus Kruse YOB: 1985   MR: [id number]      Facilitator: Recreational Therapist  Level of Participation: active  Quality of Participation: appropriate/pleasant, attentive, cooperative, and engaged  Interactions with others: appropriate  Mood/Affect: appropriate and calm  Triggers (if applicable): N/A  Cognition: coherent/clear and capable  Progress: Moderate  Comments: This group discussed the S.T.O.P. acronym which is stop, take a step back, observe, and proceed mindfully. Participants were encouraged to share how they may use this skill and when. Group also involved discussing the distress tolerance skill \"turning the mind\" and understanding the step-by-step process to work at acceptance verse rejection. Steps include: observation, making an inner commitment, practicing/doing it again, and developing a plan.       Patient attended the entire session. He was an active listener and made comments when necessary and/or encouraged. Patient was receptive to the education and appeared to fully understand the information being discussed.     Plan: continue with services      "

## 2025-07-14 NOTE — DISCHARGE INSTR - APPOINTMENTS
Outpatient Mental Health Follow Up Appointments:     Psychiatry/ Medication Mangement: Bessie Vickers MD  On 07/22/2025 at 12Noon.  At  [facility]  [address] [address] [address]  Phone: [phone];  F. [phone];    [facility] [address], [place] [address]  Phone: [phone]  (Please call and ask for Intake to schedule outpatient therapy, if no appointment listed at discharge; this sw left voice mail)

## 2025-07-14 NOTE — NURSING NOTE
"Patient was assessed sitting in the hallway away from peers for patient's privacy. Patient presents as depressed, anxious, and calm. Patient out on the unit and social with peers this shift. Rated anxiety 1/10 and depression 5/10. Denied SI/HI. Denied auditory/visual/other hallucinations. Patient has been medication and group compliant. No PRN medications administered this shift. No complaints of pain or discomfort. Patient's stated goal for today is \"attend groups and do what I need to do\". Able to state positive coping skills such as \"deep breathing, progressive muscle relaxation techniques, and working out\". Patient has been friendly and cooperative with staff. Q 15 minute checks to be maintained throughout shift for safety.    "

## 2025-07-14 NOTE — GROUP NOTE
Group Topic: Gross Motor/Balance Skills   Group Date: 7/14/2025  Start Time: 1600  End Time: 1650  Facilitators: JENNIFER Narayanan   Department: [facility] REHAB THERAPY VIRTUAL    Number of Participants: 7   Group Focus: Physical/Movement, Leisure, Social  Treatment Modality: Recreation Therapy  Interventions utilized were: Bocce Ball, Music, leisure development  Purpose: Physical Activity, Leisure Awareness, Pleasurable Activity, Teamwork, Social Stimulation    Name: Cyrus Kruse YOB: 1985   MR: [id number]      Facilitator: Recreational Therapist  Level of Participation: active  Quality of Participation: appropriate/pleasant, cooperative, and engaged  Interactions with others: appropriate  Mood/Affect: appropriate  Triggers (if applicable): N/A  Cognition: capable  Progress: Moderate  Comments: This physical activity “Bocce Ball” involved coordinating movements, social interactions, healthy competition, leisure awareness, and a pleasurable experience.     Patient attended the entire session and completed all desired group tasks. He complete all tosses/turns while standing and was independent with physical tasks.     Plan: continue with services

## 2025-07-15 PROBLEM — K58.9 IRRITABLE BOWEL SYNDROME: Status: ACTIVE | Noted: 2025-07-15

## 2025-07-15 PROBLEM — R52 GENERALIZED PAIN: Status: ACTIVE | Noted: 2025-07-15

## 2025-07-15 PROBLEM — F32.A DEPRESSIVE DISORDER: Status: ACTIVE | Noted: 2025-07-15

## 2025-07-15 PROBLEM — Z86.59 HISTORY OF DEPRESSION: Status: ACTIVE | Noted: 2025-07-15

## 2025-07-15 PROCEDURE — 99233 SBSQ HOSP IP/OBS HIGH 50: CPT | Performed by: PSYCHIATRY & NEUROLOGY

## 2025-07-15 PROCEDURE — 2500000001 HC RX 250 WO HCPCS SELF ADMINISTERED DRUGS (ALT 637 FOR MEDICARE OP): Performed by: PSYCHIATRY & NEUROLOGY

## 2025-07-15 PROCEDURE — 1240000001 HC SEMI-PRIVATE BH ROOM DAILY

## 2025-07-15 PROCEDURE — 2500000005 HC RX 250 GENERAL PHARMACY W/O HCPCS: Performed by: PSYCHIATRY & NEUROLOGY

## 2025-07-15 PROCEDURE — 97150 GROUP THERAPEUTIC PROCEDURES: CPT | Mod: GO,CO

## 2025-07-15 PROCEDURE — 2500000001 HC RX 250 WO HCPCS SELF ADMINISTERED DRUGS (ALT 637 FOR MEDICARE OP): Performed by: NURSE PRACTITIONER

## 2025-07-15 RX ADMIN — FOLIC ACID 1 MG: 1 TABLET ORAL at 09:03

## 2025-07-15 RX ADMIN — ESCITALOPRAM OXALATE 20 MG: 20 TABLET ORAL at 09:03

## 2025-07-15 RX ADMIN — LINACLOTIDE 144 MCG: 72 CAPSULE, GELATIN COATED ORAL at 06:30

## 2025-07-15 RX ADMIN — GABAPENTIN 300 MG: 300 CAPSULE ORAL at 09:03

## 2025-07-15 RX ADMIN — Medication 1 TABLET: at 09:03

## 2025-07-15 RX ADMIN — THIAMINE HCL TAB 100 MG 100 MG: 100 TAB at 09:03

## 2025-07-15 RX ADMIN — GABAPENTIN 300 MG: 300 CAPSULE ORAL at 15:11

## 2025-07-15 RX ADMIN — CLONAZEPAM 0.5 MG: 0.5 TABLET ORAL at 20:32

## 2025-07-15 RX ADMIN — Medication 3 MG: at 19:58

## 2025-07-15 RX ADMIN — GABAPENTIN 300 MG: 300 CAPSULE ORAL at 20:32

## 2025-07-15 ASSESSMENT — PAIN - FUNCTIONAL ASSESSMENT
PAIN_FUNCTIONAL_ASSESSMENT: 0-10
PAIN_FUNCTIONAL_ASSESSMENT: 0-10

## 2025-07-15 ASSESSMENT — PAIN SCALES - GENERAL
PAINLEVEL_OUTOF10: 0 - NO PAIN
PAINLEVEL_OUTOF10: 0 - NO PAIN

## 2025-07-15 NOTE — PROGRESS NOTES
"Occupational Therapy     REHAB Therapy Assessment & Treatment    Patient Name: Cyrus Kruse  MRN: [id number]  Today's Date: 7/15/2025      Activity Assessment:  Managing Expectations of Others Group 9:30 - 9:50  Assertive Communication Group 9:50 - 10:25   Problem Solving Leisure Group 11:20 - 12:00    3/3 Groups attended  Pt attended all groups with good participation, fair communication and fair insight. Pt was an active participant in all groups and was receptive to all education provided. Pt was an active participant in Managing Expectations of Others Group. Pt was engaged in the conversation which demonstrates an understanding of the group topic. During Assertive Communication Group pt completed the handout that was provided. Pt stated that he \"should have handled a response differently with a relative\" by using more assertive communication in order to have his needs met. Pt shared multiple times aloud with peers and was able to relate to peers and their personal experiences throughout the group. Pt attended Problem Solving Leisure Group which demonstrates the ability to use communication skills and problem solving skills while participating in a leisure groups activity. Pt continues to make progress towards OT goals. Note completed by FLORIDA/PARUL Colmenares under the direct supervision of YARELIS BARTHOLOMEW/L, CLT. Pt would benefit from continued OT services to improve overall self-esteem, personal confidence and positive supports for safe transition at discharge.      Encounter Problems       Encounter Problems (Active)       OT Goals       Pt will ID 2-3 effective ways to distract from crisis and ID stressors/ personal symptoms of stress in order to improve management of stress during daily activities.         Start:  07/14/25    Expected End:  08/11/25            Pt will improve ability to ID and express emotions while accepting and tolerating all emotions, in order to increase awareness of positive " emotions.         Start:  07/14/25    Expected End:  08/11/25            Pt will exhibit understanding of the symptoms, treatment, and course of their diagnosis, demonstrating ability to comply and participate in treatment plan         Start:  07/14/25    Expected End:  08/11/25            Pt will ID 2 community resources/programs to join/attend after D/C to improve their support system        Start:  07/14/25    Expected End:  08/11/25            Pt will explore and ID 1-2 strategies to manage stressors/symptoms of illness/ grief more effectively prior to discharge.         Start:  07/14/25    Expected End:  08/11/25                     Additional Comments:    BARTHOLOMEW/S collaborated with patient RN and charge RN throughout the day to provide appropriate support and encouragement to attend groups. Pt up on unit when BARTHOLOMEW/S completed last group for the day. All needs met.

## 2025-07-15 NOTE — GROUP NOTE
"Group Topic: Chemical Dependency - Dual Diagnosis   Group Date: 7/15/2025  Start Time: 1400  End Time: 1500  Facilitators: MACI NarayananS   Department: [facility] REHAB THERAPY VIRTUAL    Number of Participants: 8   Group Focus: chemical dependency education, chemical dependency issues, coping skills, and dual diagnosis  Treatment Modality: Recreation Therapy  Interventions utilized were: DBT-DT-Dual Diagnosis, Burning Bridges and Building New Ones, Alternate Rebellion, Adaptive Denial, exploration, patient education, and support  Purpose: coping skills, maladaptive thinking, feelings, insight or knowledge, relapse prevention strategies, and trigger / craving management    Name: Cyrus Kruse YOB: 1985   MR: [id number]      Facilitator: Recreational Therapist  Level of Participation: active  Quality of Participation: appropriate/pleasant, cooperative, engaged, and initiates communication  Interactions with others: appropriate and gave feedback  Mood/Affect: appropriate and brightens with interaction  Triggers (if applicable): N/A  Cognition: coherent/clear and capable  Progress: Moderate  Comments: The session involved ways to eliminate the connections we have built with our addictive behaviors and how to start adopting new different (healthier) ones.  We spent time focusing on the creation of new visual and olfactory concepts. We also talked about other healthier ways to rebel against whatever may be influencing ones addictive behaviors. The process of adapting ones wants for problematic behaviors into something alternative was also covered.    Mr. Kruse attended the entire session and completed all desired group tasks. He shared a thought about \"wishing others could actually understand and experience some of our mental health issues/thinking to better understand\". The group then discussed not wishing illness on others but to practice acceptance of others. Patient appeared to understand the group " materials and was thankful for the education.       Plan: continue with services

## 2025-07-15 NOTE — NURSING NOTE
"Pt interview in the front [facility]  Pt is sitting in the front lounge reading a book and eating a snack   Pt stated his day was \"pretty good\"  and he went to groups  Pt rated his anxiety 2/10  depression 5/10  and denied everything else at this time    He stated his goal \" get sleep\" his strength \" Im a friendly person\" and his coping skill \" I deep breath\"  Pt is appropriate with his answers to the questions at this time    "

## 2025-07-15 NOTE — NURSING NOTE
Pt took his night time medications  Pt was reading a book in the front lounge  Pt then went to bed and slept all night long   Pt did not require any prn medications   CIWA continues and continue Q 15 minute checks

## 2025-07-15 NOTE — GROUP NOTE
Group Topic: Leisure Skills   Group Date: 7/15/2025  Start Time: 1600  End Time: 1700  Facilitators: JENNIFER Narayanan   Department: [facility] REHAB THERAPY VIRTUAL    Number of Participants: 9   Group Focus: Gross motor/physical, leisure skills and relaxation  Treatment Modality: Recreation Therapy  Interventions utilized were: Pop Darts, Origami Boxes with Aroma, Music, leisure development  Purpose: Leisure Awareness, Physical Activity, Relaxation, Social/Cognitive Stimulation, Pleasurable Activity, coping skills    Name: Cyrus Kruse YOB: 1985   MR: [id number]      Facilitator: Recreational Therapist  Level of Participation: active  Quality of Participation: appropriate/pleasant, cooperative, and engaged  Interactions with others: appropriate  Mood/Affect: appropriate  Triggers (if applicable): N/A  Cognition: capable  Progress: Moderate  Comments: This physical activity “Pop Darts” involved coordinating movements, social interactions, healthy competition, leisure awareness, and a pleasurable experience. The second activity provided participants with verbal directions on constructing an origami ball. Participants had the option to smell each of the pre-mixed aromas and decide on one that they wish to have in their ball.  Assistance was provided to those that required it.      Patient attended the entire session and completed one of two offered activities. Patient was independent with completing physical tasks with the movement game. He identified not playing as well as he wished. Patient sat quietly during the origami activity declining to participate after the option was given. He appeared relaxed and focused on a novel/book.       Plan: continue with services

## 2025-07-15 NOTE — ASSESSMENT & PLAN NOTE
Plan: *1) Lexapro 20 mg Qdaily (continue)           2) Discontinue Wellbutrin  mg Qdaily (pt does not wish to take it due to lack of efficacy in the past)           3) Melatonin 3 mg at bedtime           4) Group and milieu therapy

## 2025-07-15 NOTE — NURSING NOTE
"Patient was assessed in hallway away from peers for patient's privacy. Patient presents as calm, depressed, and anxious. Patient out on the unit and social with peers this shift. Rated anxiety 1/10 and depression 5/10. Denied SI/HI. Denied auditory/visual/other hallucinations. Patient has been medication and group compliant. No PRN medications administered this shift. No complaints of pain or discomfort. Patient's stated goal for today is \"socialize with peers\". Able to state positive coping skills such as \"deep breathing\". Patient has been cooperative and friendly with staff. Q 15 minute checks to be maintained throughout shift for safety.    "

## 2025-07-15 NOTE — CARE PLAN
"The patient's goals for the shift include \"socialize with peers\"    The clinical goals for the shift include maintain safety    Over the shift, the patient did make progress toward the following goals. Barriers to progression include acuteness of illness. Recommendations to address these barriers include educate patient about safety and maintain Q 15 minute rounds for patient safety.      Problem: Safety - Adult  Goal: Free from fall injury  Outcome: Progressing     Problem: Sensory Perceptual Alteration as Evidenced by  Goal: Participates in unit activities  Outcome: Progressing     Problem: Ineffective Coping  Goal: Identifies ineffective coping skills  Outcome: Progressing     Problem: Ineffective Coping  Goal: Identifies healthy coping skills  Outcome: Progressing     Problem: Ineffective Coping  Goal: Demonstrates healthy coping skills  Outcome: Progressing     Problem: Anxiety  Goal: Verbalizes ways to manage anxiety  Outcome: Progressing       "

## 2025-07-15 NOTE — PROGRESS NOTES
"Cyrus Kruse is a 40 y.o. year old male patient who is on [facility] admission day 3.      Subjective   Cyrus Kruse is a 40 y.o. year old male patient who was personally seen and interviewed, and discussed in morning team rounds. The patient was interviewed alone at the end of the hallway (interviewed sitting in a chair), and was easily engaged and cooperative. The patient reports feeling \"pretty good\" and currently rates his depression at a 5 out of 10. No suicidal ideation or plans were elicited. He also rates his anxiety at a 1 out of 10. No hallucinations or paranoia were endorsed or noted. Patient reports that sleep was poor due to noise in the hallways. States that he was offered melatonin but declined. Slept 7.5 hours last night (broken). Patient reports that he enjoys participating in groups and socializing with other patients, these activities have contributed to improvement of mood. Patient is currently taking Lexapro 20 mg daily, gabapentin 300 mg TID, and Klonopin 0.5 mg PRN. He states that this combination of medications has worked well for him in the past (same as what he was taking before admission) and are continuing to work well for him now.         Objective   Mental Status Exam:   General: Appropriately groomed and dressed in casual/hospital attire.   Appearance: Appears stated age. Wearing glasses.   Attitude: Calm, cooperative, engaged.   Behavior: Appropriate eye contact.   Motor Activity: No agitation or retardation. No EPS/TD. Normal gait and station. Normal muscle tone and bulk.   Speech: Regular rate, rhythm, volume and tone, spontaneous, fluent. Non-pressured.   Mood: \"pretty good\"   Affect: Neutral.   Thought Process: Organized, linear, goal directed.   Thought Content: Does not endorse suicidal ideation or any suicide plans.   Does not endorse homicidal ideation.  No overt delusions or paranoia elicited.    Thought Perception: Does not endorse auditory or visual hallucinations, does not " appear to be responding to hallucinatory stimuli.   Cognition: A&O x 3. No deficits noted. Adequate fund of knowledge. No deficit in recent and remote memory. No deficits in attention, concentration or language.   Insight: Poor to fair, as patient somewhat recognizes symptoms of illness and need for recommended treatments.    Judgment: Poor to fair, as patient cannot always make reasonable decisions about ordinary activities of daily living and necessary medical care recommendations.         LABS:  Results for orders placed or performed during the hospital encounter of 07/11/25 (from the past 96 hours)   Drug Screen, Urine   Result Value Ref Range    Amphetamine Screen, Urine Presumptive Negative Presumptive Negative    Barbiturate Screen, Urine Presumptive Negative Presumptive Negative    Benzodiazepines Screen, Urine Presumptive Negative Presumptive Negative    Cannabinoid Screen, Urine Presumptive Negative Presumptive Negative    Cocaine Metabolite Screen, Urine Presumptive Negative Presumptive Negative    Fentanyl Screen, Urine Presumptive Negative Presumptive Negative    Opiate Screen, Urine Presumptive Negative Presumptive Negative    Oxycodone Screen, Urine Presumptive Negative Presumptive Negative    PCP Screen, Urine Presumptive Negative Presumptive Negative    Methadone Screen, Urine Presumptive Negative Presumptive Negative   CBC and Auto Differential   Result Value Ref Range    WBC 7.0 4.4 - 11.3 x10*3/uL    nRBC 0.0 0.0 - 0.0 /100 WBCs    RBC 4.95 4.50 - 5.90 x10*6/uL    Hemoglobin 15.7 13.5 - 17.5 g/dL    Hematocrit 46.3 41.0 - 52.0 %    MCV 94 80 - 100 fL    MCH 31.7 26.0 - 34.0 pg    MCHC 33.9 32.0 - 36.0 g/dL    RDW 11.6 11.5 - 14.5 %    Platelets 278 150 - 450 x10*3/uL    Neutrophils % 58.9 40.0 - 80.0 %    Immature Granulocytes %, Automated 0.3 0.0 - 0.9 %    Lymphocytes % 30.0 13.0 - 44.0 %    Monocytes % 6.5 2.0 - 10.0 %    Eosinophils % 3.7 0.0 - 6.0 %    Basophils % 0.6 0.0 - 2.0 %     Neutrophils Absolute 4.11 1.20 - 7.70 x10*3/uL    Immature Granulocytes Absolute, Automated 0.02 0.00 - 0.70 x10*3/uL    Lymphocytes Absolute 2.09 1.20 - 4.80 x10*3/uL    Monocytes Absolute 0.45 0.10 - 1.00 x10*3/uL    Eosinophils Absolute 0.26 0.00 - 0.70 x10*3/uL    Basophils Absolute 0.04 0.00 - 0.10 x10*3/uL   Comprehensive Metabolic Panel   Result Value Ref Range    Glucose 93 74 - 99 mg/dL    Sodium 141 136 - 145 mmol/L    Potassium 3.6 3.5 - 5.3 mmol/L    Chloride 105 98 - 107 mmol/L    Bicarbonate 24 21 - 32 mmol/L    Anion Gap 16 10 - 20 mmol/L    Urea Nitrogen 16 6 - 23 mg/dL    Creatinine 1.16 0.50 - 1.30 mg/dL    eGFR 82 >60 mL/min/1.73m*2    Calcium 9.0 8.6 - 10.3 mg/dL    Albumin 4.5 3.4 - 5.0 g/dL    Alkaline Phosphatase 80 33 - 120 U/L    Total Protein 7.4 6.4 - 8.2 g/dL    AST 28 9 - 39 U/L    Bilirubin, Total 0.6 0.0 - 1.2 mg/dL    ALT 16 10 - 52 U/L   Acute Toxicology Panel, Blood   Result Value Ref Range    Acetaminophen <10.0 10.0 - 30.0 ug/mL    Salicylate  <3 4 - 20 mg/dL    Alcohol 188 (H) <=10 mg/dL   TSH with reflex to Free T4 if abnormal   Result Value Ref Range    Thyroid Stimulating Hormone 1.14 0.44 - 3.98 mIU/L   Lavender Top   Result Value Ref Range    Extra Tube Hold for add-ons.    Electrocardiogram, 12-lead   Result Value Ref Range    Ventricular Rate 105 BPM    Atrial Rate 105 BPM    KS Interval 206 ms    QRS Duration 92 ms    QT Interval 338 ms    QTC Calculation(Bazett) 446 ms    P Axis 62 degrees    R Axis 59 degrees    T Axis 48 degrees    QRS Count 17 beats    Q Onset 219 ms    P Onset 116 ms    P Offset 172 ms    T Offset 388 ms    QTC Fredericia 407 ms        Last Recorded Vitals  Visit Vitals  /84 (BP Location: Right arm, Patient Position: Standing)   Pulse 99   Temp 36.1 °C (97 °F) (Temporal)   Resp 18        Intake/Output last 3 Shifts:  No intake/output data recorded.    Relevant Results  Scheduled medications  Scheduled Medications[1]  Continuous  medications  Continuous Medications[2]  PRN medications  PRN Medications[3]             Assessment/Plan   Assessment & Plan  Severe episode of recurrent major depressive disorder, without psychotic features (Multi)  Plan: *1) Lexapro 20 mg Qdaily (continue)           2) Discontinue Wellbutrin  mg Qdaily (pt does not wish to take it due to lack of efficacy in the past)           3) Melatonin 3 mg at bedtime           4) Group and milieu therapy    Generalized anxiety disorder  Plan: 1) see above    Alcohol use disorder, moderate, dependence (Multi)  Plan: 1) Refer to outpatient treatment program.        CHICHO MikeS         [1] escitalopram, 20 mg, oral, Daily  folic acid, 1 mg, oral, Daily  gabapentin, 300 mg, oral, TID  linaCLOtide, 144 mcg, oral, Daily before breakfast  melatonin, 3 mg, oral, Daily  multivitamin with minerals, 1 tablet, oral, Daily  polyethylene glycol, 17 g, oral, Daily  thiamine, 100 mg, oral, Daily  [2]    [3] PRN medications: acetaminophen, clonazePAM, [Held by provider] diazePAM, diphenhydrAMINE **OR** diphenhydrAMINE, docusate sodium, haloperidol **OR** haloperidol lactate, hydrOXYzine HCL, LORazepam, traZODone

## 2025-07-16 PROBLEM — F33.2 SEVERE EPISODE OF RECURRENT MAJOR DEPRESSIVE DISORDER, WITHOUT PSYCHOTIC FEATURES (MULTI): Status: RESOLVED | Noted: 2025-07-14 | Resolved: 2025-07-16

## 2025-07-16 PROCEDURE — 99233 SBSQ HOSP IP/OBS HIGH 50: CPT | Performed by: PSYCHIATRY & NEUROLOGY

## 2025-07-16 PROCEDURE — 2500000001 HC RX 250 WO HCPCS SELF ADMINISTERED DRUGS (ALT 637 FOR MEDICARE OP): Performed by: PSYCHIATRY & NEUROLOGY

## 2025-07-16 PROCEDURE — 1240000001 HC SEMI-PRIVATE BH ROOM DAILY

## 2025-07-16 PROCEDURE — 97150 GROUP THERAPEUTIC PROCEDURES: CPT | Mod: GO,CO

## 2025-07-16 PROCEDURE — 2500000005 HC RX 250 GENERAL PHARMACY W/O HCPCS: Performed by: PSYCHIATRY & NEUROLOGY

## 2025-07-16 PROCEDURE — 2500000001 HC RX 250 WO HCPCS SELF ADMINISTERED DRUGS (ALT 637 FOR MEDICARE OP): Performed by: NURSE PRACTITIONER

## 2025-07-16 RX ORDER — GABAPENTIN 300 MG/1
300 CAPSULE ORAL 3 TIMES DAILY
Qty: 90 CAPSULE | Refills: 0 | Status: SHIPPED | OUTPATIENT
Start: 2025-07-16 | End: 2025-08-15

## 2025-07-16 RX ORDER — LANOLIN ALCOHOL/MO/W.PET/CERES
100 CREAM (GRAM) TOPICAL DAILY
Qty: 30 TABLET | Refills: 0 | Status: SHIPPED | OUTPATIENT
Start: 2025-07-17

## 2025-07-16 RX ORDER — ESCITALOPRAM OXALATE 20 MG/1
20 TABLET ORAL DAILY
Qty: 30 TABLET | Refills: 0 | Status: SHIPPED | OUTPATIENT
Start: 2025-07-17 | End: 2025-08-16

## 2025-07-16 RX ORDER — TALC
3 POWDER (GRAM) TOPICAL NIGHTLY PRN
Qty: 30 TABLET | Refills: 0 | Status: SHIPPED | OUTPATIENT
Start: 2025-07-16 | End: 2025-08-15

## 2025-07-16 RX ORDER — FOLIC ACID 1 MG/1
1 TABLET ORAL DAILY
Qty: 30 TABLET | Refills: 0 | Status: SHIPPED | OUTPATIENT
Start: 2025-07-17 | End: 2025-08-16

## 2025-07-16 RX ORDER — HYDROXYZINE HYDROCHLORIDE 50 MG/1
50 TABLET, FILM COATED ORAL EVERY 6 HOURS PRN
Qty: 30 TABLET | Refills: 0 | Status: SHIPPED | OUTPATIENT
Start: 2025-07-16 | End: 2025-08-15

## 2025-07-16 RX ORDER — MULTIVIT-MIN/IRON FUM/FOLIC AC 7.5 MG-4
1 TABLET ORAL DAILY
Qty: 30 TABLET | Refills: 0 | Status: SHIPPED | OUTPATIENT
Start: 2025-07-17

## 2025-07-16 RX ADMIN — GABAPENTIN 300 MG: 300 CAPSULE ORAL at 08:50

## 2025-07-16 RX ADMIN — GABAPENTIN 300 MG: 300 CAPSULE ORAL at 15:25

## 2025-07-16 RX ADMIN — Medication 1 TABLET: at 08:50

## 2025-07-16 RX ADMIN — FOLIC ACID 1 MG: 1 TABLET ORAL at 08:50

## 2025-07-16 RX ADMIN — Medication 3 MG: at 20:40

## 2025-07-16 RX ADMIN — THIAMINE HCL TAB 100 MG 100 MG: 100 TAB at 08:50

## 2025-07-16 RX ADMIN — GABAPENTIN 300 MG: 300 CAPSULE ORAL at 20:40

## 2025-07-16 RX ADMIN — LINACLOTIDE 144 MCG: 72 CAPSULE, GELATIN COATED ORAL at 06:34

## 2025-07-16 RX ADMIN — ESCITALOPRAM OXALATE 20 MG: 20 TABLET ORAL at 08:50

## 2025-07-16 ASSESSMENT — PAIN SCALES - GENERAL
PAINLEVEL_OUTOF10: 0 - NO PAIN
PAINLEVEL_OUTOF10: 0 - NO PAIN

## 2025-07-16 ASSESSMENT — PAIN - FUNCTIONAL ASSESSMENT
PAIN_FUNCTIONAL_ASSESSMENT: 0-10
PAIN_FUNCTIONAL_ASSESSMENT: 0-10

## 2025-07-16 NOTE — GROUP NOTE
Group Topic: Gross Motor/Balance Skills   Group Date: 7/16/2025  Start Time: 1600  End Time: 1700  Facilitators: JENNIFER Narayanan   Department: [facility] REHAB THERAPY VIRTUAL    Number of Participants: 7   Group Focus: Physical/Movement, Leisure, Social  Treatment Modality: Recreation Therapy  Interventions utilized were: Nintendo Wii, Music, leisure development  Purpose: Leisure Awareness, Physical Activity, Pleasurable Activity, Social/Cognitive Stimulation     Name: Cyrus Kruse YOB: 1985   MR: [id number]      Facilitator: Recreational Therapist  Level of Participation: active  Quality of Participation: appropriate/pleasant, cooperative, and engaged  Interactions with others: appropriate  Mood/Affect: appropriate and brightens with interaction  Triggers (if applicable): N/A  Cognition: capable  Progress: Moderate  Comments: This session involved participating in multiple movement games via the [employer]. Patient participants were encouraged to practice coordinating motor movements, socialize with peers, and increase leisure awareness. For those participants that don't utilize technology for leisure they will be assisted with cognitive and movement tasks.      Patient attended the entire session and completed all desired group tasks. He participated in multiple games all while standing. He was independent with all physical and cognitive tasks.     Plan: continue with services

## 2025-07-16 NOTE — ASSESSMENT & PLAN NOTE
Plan: 1) Lexapro 20 mg Qdaily (continue)           2) Discontinue Wellbutrin  mg Qdaily (pt does not wish to take it due to lack of efficacy in the past)           3) Melatonin 3 mg at bedtime           4) Group and milieu therapy          *5) Plan for discharge if stable over night

## 2025-07-16 NOTE — PROGRESS NOTES
"Cyrus Kruse is a 40 y.o. year old male patient who is on [facility] admission day 4.      Subjective   Cyrus Kruse is a 40 y.o. year old male patient who was personally seen and interviewed, and discussed in morning team rounds. The patient was interviewed alone at the end of the hallway (interviewed sitting in a chair), and was easily engaged and cooperative. The patient reports feeling \"okay\" and currently rates his depression at a 5 out of 10. No suicidal ideation or plans were elicited. He also rates his anxiety at a 2 out of 10. No hallucinations or paranoia were endorsed or noted.     Patient reports that he slept poorly last night, which is contributing to his mood. Thinks he only got a couple hours of continuous sleep, says the AC unit is loud and he can hear doors opening and closing throughout the night. Patient describes that he has a lot of things to do at home which is sometimes on the back of his mind, keeping him up as well. He took his scheduled melatonin and asked for PRN klonopin which he thought would help him sleep, but states that they didn't. Patient reports that his medication regimen is working well for him otherwise, but acknowledges that there is \"no magic pill\" that will fix everything. Instead he says he additionally needs to rely on coping mechanisms. Remarks that he has tried TMS therapy in the past which was not helpful. Overall, the patient reports that he doesn't enjoy being here because he is used to keeping a regular routine which he is unable to do here. He does report that he has been participating in groups which he states are helpful.        Objective   Mental Status Exam:   General: Appropriately groomed and dressed in casual and hospital attire.   Appearance: Appears stated age. Wearing glasses.   Attitude: Calm, cooperative, engaged.   Behavior: Appropriate eye contact.   Motor Activity: No agitation or retardation. No EPS/TD. Normal gait and station. Normal muscle tone and " "bulk.   Speech: Regular rate, rhythm, volume and tone, spontaneous, fluent. Non-pressured.   Mood: \"okay\"   Affect: Neutral.   Thought Process: Organized, linear, goal directed.   Thought Content: Does not endorse suicidal ideation or any suicide plans.   Does not endorse homicidal ideation.  No overt delusions or paranoia elicited.    Thought Perception: Does not endorse auditory or visual hallucinations, does not appear to be responding to hallucinatory stimuli.   Cognition: A&O x 3. No deficits noted. Adequate fund of knowledge. No deficit in recent and remote memory. No deficits in attention, concentration or language.   Insight: Fair, as patient recognizes symptoms of illness and need for recommended treatments.    Judgment: Fair, as patient can make reasonable decisions about ordinary activities of daily living and necessary medical care recommendations.         LABS:  No results found for this or any previous visit (from the past 96 hours).     Last Recorded Vitals  Visit Vitals  /75 (Patient Position: Standing)   Pulse 97   Temp 36.5 °C (97.7 °F) (Temporal)   Resp 16        Intake/Output last 3 Shifts:  No intake/output data recorded.    Relevant Results  Scheduled medications  Scheduled Medications[1]  Continuous medications  Continuous Medications[2]  PRN medications  PRN Medications[3]             Assessment/Plan   Assessment & Plan  Severe episode of recurrent major depressive disorder, without psychotic features (Multi)  Plan: 1) Lexapro 20 mg Qdaily (continue)           2) Discontinue Wellbutrin  mg Qdaily (pt does not wish to take it due to lack of efficacy in the past)           3) Melatonin 3 mg at bedtime           4) Group and milieu therapy          *5) Plan for discharge if stable over night     Generalized anxiety disorder  Plan: 1) see above    Alcohol use disorder, moderate, dependence (Multi)  Plan: 1) Refer to outpatient treatment program.        DENISE Mike     "     [1] escitalopram, 20 mg, oral, Daily  folic acid, 1 mg, oral, Daily  gabapentin, 300 mg, oral, TID  linaCLOtide, 144 mcg, oral, Daily before breakfast  melatonin, 3 mg, oral, Daily  multivitamin with minerals, 1 tablet, oral, Daily  polyethylene glycol, 17 g, oral, Daily  thiamine, 100 mg, oral, Daily  [2]    [3] PRN medications: acetaminophen, clonazePAM, diphenhydrAMINE **OR** diphenhydrAMINE, docusate sodium, haloperidol **OR** haloperidol lactate, hydrOXYzine HCL, LORazepam, traZODone

## 2025-07-16 NOTE — GROUP NOTE
"Group Topic: Personal Responsibility   Group Date: 7/16/2025  Start Time: 1400  End Time: 1500  Facilitators: JENNIFER Narayanan   Department: [facility] REHAB THERAPY VIRTUAL    Number of Participants: 9   Group Focus: communication, personal responsibility, and social skills  Treatment Modality: Recreation Therapy  Interventions utilized were: Living Skills-Interpersonal Skills and Conflict Resolution, clarification, confrontation, exploration, patient education, story telling, and support  Purpose: Healthy Relationships, communication skills and insight or knowledge    Name: Cyrus Kruse YOB: 1985   MR: [id number]      Facilitator: Recreational Therapist  Level of Participation: moderate  Quality of Participation: appropriate/pleasant, attentive, cooperative, and quiet  Interactions with others: appropriate  Mood/Affect: appropriate and calm   Triggers (if applicable): N/A  Cognition: capable  Progress: Moderate  Comments: We watched a video, had dialogue, and reviewed handouts around the living skill of communication.  The handouts included:  positive communication skills, healthy steps to conflict resolution, building/maintaining relationships, and identifying what you have learned in relationships.  The video included educational information and the personal sharing of individuals both in recovery and assisting a recovery process.    Patient attended the entire session. They were receptive to the information and appeared to understand the content. Patient provided some feedback when discussing emotional reactions to situations. The group was processing the concept of events being \"neutral\" and individuals applying a positive or negative emotional charge. Patient was offered some clarity on why it may be effective to remain neutral in most circumstances that don't require a emotional reaction.      Plan: continue with services      "

## 2025-07-16 NOTE — PROGRESS NOTES
"Occupational Therapy     REHAB Therapy Assessment & Treatment    Patient Name: Cyrus Kruse  MRN: [id number]  Today's Date: 7/16/2025      Activity Assessment:  9:30 - 9:55 Gratitude Exploration Group  10:00 - 10:30 Communication and Problem Solving Group  11:00 - 11:30 Creative Expression Leisure Group    3/3 Groups Attended   Pt attended all groups with good participation, good communication and fair insight. Pt was an active participant in all groups and was receptive to all education provided. In Gratitude Exploration Group pt complete the handout provided. Pt stated that he is grateful for \"people here, staff here, family, coffee, shower, movies, books and dog\". In Communication and Problem Solving Group pt demonstrated the ability to use communication skills and problem solving skills while engaging in a group leisure activity. In Creative Expression Leisure Group pt identified three positive qualities about himself without hesitation. Pt continues to make progress towards OT goals. Note completed by FLORIDA/PARUL Colmenares under the direct supervision of YARELIS BARTHOLOMEW/L, CLT. Pt would benefit from continued OT services to improve overall self-esteem, personal confidence and positive supports for safe transition at discharge.      Encounter Problems       Encounter Problems (Active)       OT Goals       Pt will ID 2-3 effective ways to distract from crisis and ID stressors/ personal symptoms of stress in order to improve management of stress during daily activities.   (Progressing)       Start:  07/14/25    Expected End:  08/11/25            Pt will improve ability to ID and express emotions while accepting and tolerating all emotions, in order to increase awareness of positive emotions.   (Progressing)       Start:  07/14/25    Expected End:  08/11/25            Pt will exhibit understanding of the symptoms, treatment, and course of their diagnosis, demonstrating ability to comply and participate in " treatment plan   (Progressing)       Start:  07/14/25    Expected End:  08/11/25            Pt will ID 2 community resources/programs to join/attend after D/C to improve their support system  (Progressing)       Start:  07/14/25    Expected End:  08/11/25            Pt will explore and ID 1-2 strategies to manage stressors/symptoms of illness/ grief more effectively prior to discharge.   (Progressing)       Start:  07/14/25    Expected End:  08/11/25                   Additional Comments:  BARTHOLOMEW/S collaborated with patient RN and charge RN throughout the day to provide appropriate support and encouragement to attend groups. Pt up on unit when BARTHOLOMEW/S completed last group for the day. All needs met.

## 2025-07-16 NOTE — NURSING NOTE
"Pt interview in the front [facility]  Pt is watching TV and eating a snack  Pt is wearing his own clothes and said his day was \"OK\"  Pt is wearing his own clothes   He said his anxiety 3/10  depression 5/10 sand denied everything else at this time  Pt stated his goal \"get sleep'  his strength \"I communicate well\" and his coping skill \" I deep breath\"   Pt is appropriate with  his answers artemio the questions at this time   "

## 2025-07-16 NOTE — NURSING NOTE
"Patient stated he did not sleep very good due to the environment/noises on the floor. Patient rated anxiety 2/10, depression 5/10 and denied SI/HI and hallucinations. Patient rated pain 0/10. Patient hasn't had a bowel movement in six days, but does not want anything for it. Patient was educated on trying to take something for it. Patient states he cannot due to no privacy in his room. Patient's coping skill is deep breathing. Goal is \"to leave\". Strength is communicating and a listener. Medication compliant and no prn's given at this time. Q 15 checks done.   "

## 2025-07-16 NOTE — NURSING NOTE
Pt took his night time medications  Pt was out in the front lounge reading and talking with other patients  Pt then went to bed and had an uneventful night  Pt slept all night  long  Continue Q 15 minute rounds

## 2025-07-16 NOTE — CARE PLAN
Phoned patient's mother, Natty, p. [phone]: she stated she is glad pt was admitted, that he needed the help; pt told mom he knows he should not have drank excessively on his medication, knows it is not good and he will not do so again.  She is very supportive, stated he can come home, thinks he is ready to come home, has no safety concerns; She agreed discharge tomorrow, Thursday would be fine and she and her  will come and pick pt up at 11 AM tomorrow, 07/17 for discharge; His appointments with psychiatry and counseling (Dr. Vickers and Anna, respectively) are noted on his After Care Summary;  Sw to follow.

## 2025-07-16 NOTE — GROUP NOTE
Group Topic: Goals   Group Date: 7/16/2025  Start Time: 0730  End Time: 0830  Facilitators: JENNIFER Narayanan   Department: [facility] REHAB THERAPY VIRTUAL    Number of Participants: 10   Group Focus: check in, daily focus, and goals  Treatment Modality: Recreation Therapy  Interventions utilized were: Recovery Concept Cards, Goals, exploration, orientation, problem solving, and support  Purpose: maladaptive thinking, feelings, insight or knowledge, self-worth, and self-care    Name: Cyrus Kruse YOB: 1985   MR: [id number]      Facilitator: Recreational Therapist  Level of Participation: minimal, left group   Quality of Participation: passive and withdrawn  Interactions with others: minimal   Mood/Affect: flat  Triggers (if applicable): N/A  Cognition: selectively attentive   Progress: Minimal  Comments: The group utilized cards which included concepts of recovery. They were individually handed out to each participant.  Patients were asked to discuss, reflect on, and create a goal related to the ideas (examples: attitude, mood, coping skills, physical/mental health, safety, honesty, communication, etc.) the group shared.     Mr. Kruse attended most of the session. He did leave prior to sharing his concept card. Patient only commented on one of the other areas of recovery. He was less engaged than he had been in previous sessions.     Plan: continue with services

## 2025-07-17 VITALS
RESPIRATION RATE: 16 BRPM | OXYGEN SATURATION: 99 % | WEIGHT: 179.23 LBS | DIASTOLIC BLOOD PRESSURE: 81 MMHG | HEART RATE: 87 BPM | TEMPERATURE: 97.3 F | SYSTOLIC BLOOD PRESSURE: 121 MMHG | BODY MASS INDEX: 26.55 KG/M2 | HEIGHT: 69 IN

## 2025-07-17 PROCEDURE — 2500000001 HC RX 250 WO HCPCS SELF ADMINISTERED DRUGS (ALT 637 FOR MEDICARE OP): Performed by: PSYCHIATRY & NEUROLOGY

## 2025-07-17 PROCEDURE — 99239 HOSP IP/OBS DSCHRG MGMT >30: CPT | Performed by: PSYCHIATRY & NEUROLOGY

## 2025-07-17 PROCEDURE — 2500000001 HC RX 250 WO HCPCS SELF ADMINISTERED DRUGS (ALT 637 FOR MEDICARE OP): Performed by: NURSE PRACTITIONER

## 2025-07-17 RX ADMIN — GABAPENTIN 300 MG: 300 CAPSULE ORAL at 08:30

## 2025-07-17 RX ADMIN — ESCITALOPRAM OXALATE 20 MG: 20 TABLET ORAL at 08:30

## 2025-07-17 RX ADMIN — LINACLOTIDE 144 MCG: 72 CAPSULE, GELATIN COATED ORAL at 06:32

## 2025-07-17 RX ADMIN — FOLIC ACID 1 MG: 1 TABLET ORAL at 08:30

## 2025-07-17 RX ADMIN — THIAMINE HCL TAB 100 MG 100 MG: 100 TAB at 08:30

## 2025-07-17 RX ADMIN — Medication 1 TABLET: at 08:30

## 2025-07-17 ASSESSMENT — PAIN SCALES - GENERAL: PAINLEVEL_OUTOF10: 0 - NO PAIN

## 2025-07-17 ASSESSMENT — PAIN - FUNCTIONAL ASSESSMENT: PAIN_FUNCTIONAL_ASSESSMENT: 0-10

## 2025-07-17 NOTE — NURSING NOTE
"Patient stated he slept good. Anxiety 2/10, depression 5/10. Denies SI/HI and all hallucinations. Pain 0/10. Last bm 7/16/25. Coping skill is walking, goal is \"take as much on as I can\". Strength is listening and communicating. Patient medication compliant, cooperative and friendly with both staff and peers. Patient participated in group activities during hospital stay. Q15 checks done. No PRN medications given.   "

## 2025-07-17 NOTE — NURSING NOTE
Pt took his night time medications  He watched TV and talked with other patients  He then went to sleep and slept all night long  Pt did not require any prn medications  Pt had an uneventful night  Pt being discharged today  Continue Q 15 rounds

## 2025-07-17 NOTE — NURSING NOTE
Discharge instructions reviewed with patient. No questions at this time. Education given for new homegoing medications with type, uses, and most common side effects. Patient verbalized understanding. Discharged home in stable condition.

## 2025-07-17 NOTE — GROUP NOTE
"Group Topic: Spiritual/Devotional/Thought of the Day   Group Date: 7/17/2025  Start Time: 0730  End Time: 0800  Facilitators: JENNIFER Mandujano   Department: [facility] REHAB THERAPY VIRTUAL    Number of Participants: 10   Group Focus: coping skills, daily focus, goals, personal responsibility, and social skills  Treatment Modality: Recreational Therapy   Interventions utilized were Thought - \"Doing what is good for us\", exploration, patient education, and support  Purpose: coping skills, maladaptive thinking, insight or knowledge, self-worth, and self-care    Name: Cyrus Kruse YOB: 1985   MR: [id number]      Facilitator: Recreational Therapist  Level of Participation: moderate  Quality of Participation: appropriate/pleasant, cooperative, and quiet  Interactions with others: appropriate  Mood/Affect: appropriate and brightens with interaction  Triggers (if applicable): n/a  Cognition: coherent/clear  Progress: Moderate  Comments: Patients were provided with the Thought of the Day reading - “Doing what is good for us/Recovery means change; recovery means work.” Patients were given an opportunity to share their thoughts and encouraged to create a personal goal based on the reading.      Pt was calm, quiet, and attentive this morning. He did not contribute to discussion, but appeared to be actively listening and following along.    Plan: continue with services      "

## 2025-07-17 NOTE — CARE PLAN
"The patient's goals for the shift include \"take as much on as i can\"    The clinical goals for the shift include medication compliant\"        "

## 2025-07-17 NOTE — CARE PLAN
Problem: Pain - Adult  Goal: Verbalizes/displays adequate comfort level or baseline comfort level  Outcome: Adequate for Discharge     Problem: Infection - Adult  Goal: Absence of infection at discharge  Outcome: Adequate for Discharge  Goal: Absence of infection during hospitalization  Outcome: Adequate for Discharge  Goal: Absence of fever/infection during anticipated neutropenic period  Outcome: Adequate for Discharge     Problem: Safety - Adult  Goal: Free from fall injury  Outcome: Adequate for Discharge     Problem: Discharge Planning  Goal: Discharge to home or other facility with appropriate resources  Outcome: Adequate for Discharge     Problem: Chronic Conditions and Co-morbidities  Goal: Patient's chronic conditions and co-morbidity symptoms are monitored and maintained or improved  Outcome: Adequate for Discharge     Problem: Nutrition  Goal: Nutrient intake appropriate for maintaining nutritional needs  Outcome: Adequate for Discharge     Problem: Discharge Planning - Care Management  Goal: Discharge to post-acute care or home with appropriate resources  Outcome: Adequate for Discharge     Problem: Community resource needs  Goal: Patient is receiving increased resource support to enhance ability to remain at home  Outcome: Adequate for Discharge     Problem: Mental health issues  Goal: Stabilize adverse mental health factors affecting plan of care  Outcome: Adequate for Discharge     Problem: Sensory Perceptual Alteration as Evidenced by  Goal: Cooperates with admission process  Outcome: Adequate for Discharge  Goal: Patient/Family participate in treatment and discharge plans  Outcome: Adequate for Discharge  Goal: Patient/Family verbalizes awareness of resources  Outcome: Adequate for Discharge  Goal: Participates in unit activities  Outcome: Adequate for Discharge     Problem: Potential for Harm to Self or Others  Goal: Denies harm toward self or others  Outcome: Adequate for Discharge  Goal: Free  from restraint events  Outcome: Adequate for Discharge     Problem: Ineffective Coping  Goal: Identifies ineffective coping skills  Outcome: Adequate for Discharge  Goal: Identifies healthy coping skills  Outcome: Adequate for Discharge  Goal: Demonstrates healthy coping skills  Outcome: Adequate for Discharge  Goal: Participates in unit activities  Outcome: Adequate for Discharge     Problem: Potential for Substance Withdrawal  Goal: Free of withdrawal symptoms  Outcome: Adequate for Discharge     Problem: Anxiety  Goal: Verbalizes ways to manage anxiety  Outcome: Adequate for Discharge     Problem: Self Care Deficit  Goal: Accepts need for medications  Outcome: Adequate for Discharge     Problem: Defensive Coping  Goal: Discusses and identifies healthy coping skills  Outcome: Adequate for Discharge

## 2025-07-17 NOTE — DISCHARGE SUMMARY
"Admission Date: 7-  Discharge Date: 7-      Reason For Admission: Depression with suicidal ideation.       Discharge Diagnosis  1) Major Depressive Disorder, recurrent, severe without psychotic features  2) Generalized Anxiety Disorder  3) Alcohol Use Disorder, moderate, dependence            Initial Admission :   HPI: Cyrus Kruse is a 40 y.o. male with a psychiatric hx of MDD and DIMA and eating disorder (12 years old) and medical history of constipation/IBS who presented to [facility] ER due to alcohol intoxication and increased depression with suicidal ideation. Per ER note:  Patient reported being at a bar where he was \"drinking too much\" and left a note. His father accompanied him to the ED and reported that police informed him that patient left a note describing SI w/ plan of hanging (report of method given by patient's mother). Patient admitted to worsening depression and anxiety but confirmed compliance with his medication regimen. He presented with a BAL of 188, noting occasional use of alcohol \"maybe once per month.\" Patient's father reported concerns about patient's SI and increase in depressive symptoms to ED staff. Patient denied SI/HI/AH/VH. EKG reviewed and showed sinus tachycardia without a prolonged QT and no ST abnormalities.  CMP unremarkable, TSH within normal limits, CBC unremarkable, Tylenol and aspirin levels are negative, ethanol 188, tox screen negative. Pt is medically cleared and admitted to [facility] for further psychiatric evaluation and treatment.   On evaluation, Pt admits that he was at bar and had too much alcohol and left note, but couldn't recall what he wrote in note.  Per ER note, pt's father reported that “patient left a note describing SI w/ plan of hanging (report of method given by patient's mother” Pt currently denies SI/plan/intention. Pt admits chronic depression since Teen, with intermittent worsening in the context of external stressor. 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   Pt endorses following sxs of depression and anxiety: depressed mood, poor energy and motivation and concentration,  excessive worries, anxiety, irritability, feeling worthless, shame, isolation. Pt reports fair appetite. Pt denies SI or HI or intention or plan. The pt is willing to receive help and to be admitted to psychiatry inpt program. Currently, Pt denies manic sxs, psychotic sxs. Pt denies use of illicit drugs. Pt reports drinks sometimes, 2-3 times a month, few beers a time.       Patient has one prior inpatient psychiatric hospitalization at [facility] at age 12 for few months due to depression and eating disorder. Pt reports eating disorder is recovered but his depression has been chronic and he has tried many medications (pt recalled Lithium, ablify, Seroquel, lutuda, Haldol, Prozac, Cymbalta, Adderall).  In the early days, he was seen by a psychiatrist Dr. Cedeno for 6-8 years dx'ed with bipolar disorder; currently he was treated with new psychiatrist Dr. Marbella Coffey for past 3 years who diagnosed pt with MDD and prescribed Lexapro (30mg/day) but he only takes 20mg/day, gabapentin 300mg TID and Klonpin 0.5mg PRN. Pt reports Lexapro helps with about 50% better.      Per EPAT note: “Patient was assessed when deemed clinically sober. He was A&Ox4 and remained withdrawn but cooperative. When asked what brought him to the ED, patient explained that he was at a bar, had a few drinks and left a note \"but it wasn't supposed to be serious.\" Police were called and showed up to his home for a wellness check before he presented to the ED. 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 He reported that he continues to care for " "himself and is eating/sleeping well but denies engaging in activities on a day-to-day basis. He reported that he often stays home, does not have social interactions and could not identify future plans or goals, aside from taking his exam. He reported that he works with a therapist \"every once in a while\" and meets with his psychiatrist every 3 months. Patient appeared to minimize his symptomology throughout the assessment.”     Per EPAT note for collateral: “Patient's mother, Natty, provided collateral information. She reported that he has a hx of \"all kinds of stuff,\" noting that he began having issues with his mental health at age 12 due to relentless bullying. She confirmed that patient went to a bar last night and left a note that reflected that he wanted to hang himself. She denied awareness of patient expressing suicidal ideation recently but noted that he has become increasingly irritable and depressed, which she believes may be due to his current medication. She also observed him become \"very agitated more often. He was banging against his head with his fists saying that he was trying to clear his head.\" She was concerned a few days ago \"because his mood flipped and he was extra happy. I didn't know if he was going to do something to himself.\" She believes that patient is often frustrated due to \"being 40 years old, still not working and living with his parents. He wants to do something he can be proud of.\" She also shared recent stressors due to patient's dog that is dying and inability to pass an exam. She reported belief that he \"has OCD behavior,\" briefly referencing his preferences with oral care utensils, but struggled to elaborate. She stated that patient \"can't handle hardly anything\" and believes that he would benefit from further assessment. “        Hospital Course:       Following admission to the [facility] at [facility], Cyrus was discontinued from Wellbutrin  mg Qdaily due to a reported " "lack of efficacy, and was continued on his Lexapro 20 mg Qdaily, to help treat his depressive and anxiety symptoms. Cyrus also attended unit groups to help with coping skills, stating \"I enjoyed them and they were helpful.\" At the time of discharge, Cyrus denied experiencing any hallucinations, paranoia, significant anxiety, manic symptoms, or symptoms of Major Depressive Disorder except for mild depressive feelings.        Cyrus signed in voluntarily onto the unit during his hospital stay.            Mental Status Exam:   General: Appropriately groomed and dressed in casual/hospital attire.   Appearance: Appears stated age.   Attitude: Calm, cooperative.   Behavior: Appropriate eye contact.   Motor Activity: No agitation or retardation. No EPS/TD. Normal gait and station. Normal muscle tone and bulk.   Speech: Regular rate, rhythm, volume and tone, spontaneous, fluent. Non-pressured.   Mood: \"Good\"   Affect: Neutral.   Thought Process: Organized, and goal directed.   Thought Content: Does not endorse suicidal ideation or any suicide plans.   Does not endorse homicidal ideation or plans.  No overt delusions or paranoia elicited.   Thought Perception: Does not endorse auditory or visual hallucinations, does not appear to be responding to hallucinatory stimuli.   Cognition: Alert, oriented x 3. No deficits noted. Adequate fund of knowledge. No deficit in recent and remote memory. No deficits in attention, concentration or language.   Insight: Fair-to-good, as patient recognizes symptoms of  illness and need for recommended treatments.    Judgment: Intact, as patient can make reasonable decisions about ordinary activities of daily living and necessary medical care recommendations.            Risk Assessment at Discharge:  Cyrus is judged a minimal suicide risk due to: 1) Denies gun ownership and denies their are any guns at his parent's home (where he currently resides), 2) Denies any prior suicide attempts, 3) " "Denies any current suicidal ideation or suicide plans, 4) +plans for the future: \"... Retake my exam for occupational therapy... look for work...,\" 5) Not currently intoxicated, 6) No symptoms of Major Depressive Disorder except for mild depressive feelings, elicited at discharge, and 7) Cyrus's mother (per 7/16/25 phone conversation with  Rhina) has no concerns about his safety when discharged today.           Discharge Medications:  Scheduled medications  Scheduled Medications[1]  Continuous medications  Continuous Medications[2]  PRN medications  PRN Medications[3]         I spent over 30 minutes in the preparation of this summary. All 11 elements of the transition record were discussed with the patient and or caregiver and the receiving inpatient facility (if applicable).  A copy of the transition record was given to the patient and was transmitted to the outpatient provider accepting the patient's care following  discharge.    Patient's illness, medication side effects, benefits and risk were reviewed with the patient prior to discharge. The patient voiced understanding of their diagnosis, the medications recommended along with the importance of mediation compliance.  The patient was counseled not to stop medications without the supervision of a psychiatrist.  The patient was counseled to follow-up with their outpatient medical provider as indicated. The patient was counseled that if there was an increase in mental health issues, depression, anxiety, medication side effects, self harming thoughts or thoughts to harm others, to call [facility] or 911 and come to the nearest emergency room. The patient also received information regarding advanced mental and medical health directives during this hospitalization which they could discuss with their outpatient provider. The plan was discussed with the patient, the nurse and the social work department. The patient voiced understanding and agreement " with the plan.  ------------------------------------------------------------------------------------------------------------------------------------------------------------------------------------------------------  Substance Use Risk Assessment:    Nicotine: Risks of continued tobacco use was addressed with the patient which included: inpatient education and counseling of the risks of oral, esophageal as well as other organ cancers (including oral, dermatological, gastric, pancreatic, respiratory) along with the ongoing risk of neurological and cardiovascular disease/events (strokes, angina). Treatment options for cessation were offered to include: alternate tobacco products, both inpatient/outpatient counseling. Replacement products were offered during this admission and prescribed at the time of discharge along with a referral to outpatient cessation counseling.    Alcohol: The increase in morbidity and mortality, financial, both interpersonal and physical health risk in direct relationship to the use of alcohol ( in either a binge pattern or a sustained use over time) was discussed with the patient. Risks of intoxication, disinhibition, legal and interpersonal issues as well as abuse and dependence, along with the    increased risks of organ damage (cardiac, neurological, esophageal, gastric, liver, pancreatic, renal dysfunction among others) was discussed. The risks of decreased hepatic clearance and increased medication serum drug levels along with increase in potential medication side effects, was also discussed.   Options for treatment: Discussed was reduction in alcohol consumption, referral to dual diagnosis program, residential rehabilitation programs, AA, NA, JOSE, gabapentin and oral naltrexone, if meets criteria as a candidate for these medications.    Street Drugs: Street drug use was addressed on admission, including both physical, mental, financial and psychological risk factors of ongoing use.  There are no FDA prescribed treatment medications for cannabis, stimulants use/abuse (cocaine, PCP) or hallucinogens.  Patient was screened for concomitant other drugs used (tobacco, alcohol). Treatment options available were discussed ( if applicable) AA, NA, JOSE, and outpatient dual diagnosis therapy, treatment programs. Patient voiced understanding of their treatment options.          Plan:  1) Continue current medications as prescribed at discharge.  2) Follow-up:    Oct    21 Primary Care Physical with Vicky Mckinney Oct 21, 2025 3:00 PM (Arrive by 2:45 PM)  You should arrive 15 minutes prior to your appointment and check-in at the registration desk.    Please bring a Photo ID and your insurance card to your appointment.    Our office is cashless. Payments can be made by card, Apple Pay or [email or web address], or you can pay online during eCheck-in.  [facility]  [address] [place] [id number] [phone]       Additional Information  Outpatient Mental Health Follow Up Appointments:      Psychiatry/ Medication Mangement: Bessie Vickers MD  On 07/22/2025 at 12Noon.  At  [facility]  [address] [place] [address]  Phone: [phone];  F. [phone];     [facility] [address], [place] [address]  Phone: [phone]  (Please call and ask for Intake to schedule outpatient therapy, if no appointment listed at discharge; this sw left voice mail)           Lico Bryant MD         [1] escitalopram, 20 mg, oral, Daily  folic acid, 1 mg, oral, Daily  gabapentin, 300 mg, oral, TID  linaCLOtide, 144 mcg, oral, Daily before breakfast  melatonin, 3 mg, oral, Daily  multivitamin with minerals, 1 tablet, oral, Daily  polyethylene glycol, 17 g, oral, Daily  thiamine, 100 mg, oral, Daily    [2]    [3] PRN medications: acetaminophen, clonazePAM, diphenhydrAMINE **OR** diphenhydrAMINE, docusate sodium, haloperidol **OR** haloperidol lactate,  hydrOXYzine HCL, LORazepam, traZODone

## 2025-07-17 NOTE — NURSING NOTE
"Pt interview in the [facility] by room 204  Pt is watching TV and eating his snack  Pt is wearing his own clothes and stated his day was  \"pretty good\"  Pt is being discharged tomorrow  Pt stated he is ready to go  Pt rated his anxiety 2/10  depression 4/10  and denied everything else at this time  Pt stated his  goal \"get sleep\"  his strength \"Im a good listener\" and his coping skill \" I walk\"  Pt is appropriate with his answers to the questions at this time  "

## 2025-07-30 DIAGNOSIS — K58.9 IRRITABLE BOWEL SYNDROME, UNSPECIFIED TYPE: ICD-10-CM

## 2025-09-06 LAB
ATRIAL RATE: 105 BPM
P AXIS: 62 DEGREES
P OFFSET: 172 MS
P ONSET: 116 MS
PR INTERVAL: 206 MS
Q ONSET: 219 MS
QRS COUNT: 17 BEATS
QRS DURATION: 92 MS
QT INTERVAL: 338 MS
QTC CALCULATION(BAZETT): 446 MS
QTC FREDERICIA: 407 MS
R AXIS: 59 DEGREES
T AXIS: 48 DEGREES
T OFFSET: 388 MS
VENTRICULAR RATE: 105 BPM

## 2025-10-21 ENCOUNTER — APPOINTMENT (OUTPATIENT)
Dept: PRIMARY CARE | Facility: CLINIC | Age: 40
End: 2025-10-21
Payer: COMMERCIAL